# Patient Record
Sex: MALE | Race: WHITE | Employment: PART TIME | URBAN - METROPOLITAN AREA
[De-identification: names, ages, dates, MRNs, and addresses within clinical notes are randomized per-mention and may not be internally consistent; named-entity substitution may affect disease eponyms.]

---

## 2017-06-20 LAB — HIV AG/AB: NORMAL

## 2018-05-08 LAB
CHOLESTEROL, TOTAL: 230 MG/DL
CHOLESTEROL/HDL RATIO: 7.4
HDLC SERPL-MCNC: 31 MG/DL (ref 35–70)
LDL CHOLESTEROL CALCULATED: 155 MG/DL (ref 0–160)
TRIGL SERPL-MCNC: 220 MG/DL
VLDLC SERPL CALC-MCNC: ABNORMAL MG/DL

## 2019-01-17 ENCOUNTER — OFFICE VISIT (OUTPATIENT)
Dept: FAMILY MEDICINE CLINIC | Age: 64
End: 2019-01-17
Payer: COMMERCIAL

## 2019-01-17 VITALS
TEMPERATURE: 98.1 F | RESPIRATION RATE: 18 BRPM | BODY MASS INDEX: 27.76 KG/M2 | OXYGEN SATURATION: 98 % | HEART RATE: 78 BPM | WEIGHT: 228 LBS | SYSTOLIC BLOOD PRESSURE: 142 MMHG | DIASTOLIC BLOOD PRESSURE: 90 MMHG | HEIGHT: 76 IN

## 2019-01-17 DIAGNOSIS — E78.2 MIXED HYPERLIPIDEMIA: ICD-10-CM

## 2019-01-17 DIAGNOSIS — F41.9 ANXIETY: ICD-10-CM

## 2019-01-17 DIAGNOSIS — G47.00 INSOMNIA, UNSPECIFIED TYPE: ICD-10-CM

## 2019-01-17 DIAGNOSIS — I10 ESSENTIAL HYPERTENSION: ICD-10-CM

## 2019-01-17 DIAGNOSIS — M33.90 DERMATOMYOSITIS (HCC): ICD-10-CM

## 2019-01-17 PROCEDURE — 99203 OFFICE O/P NEW LOW 30 MIN: CPT | Performed by: FAMILY MEDICINE

## 2019-01-17 RX ORDER — NEBIVOLOL 10 MG/1
10 TABLET ORAL DAILY
COMMUNITY
End: 2019-01-17 | Stop reason: ALTCHOICE

## 2019-01-17 RX ORDER — CARVEDILOL 12.5 MG/1
12.5 TABLET ORAL 2 TIMES DAILY
Qty: 60 TABLET | Refills: 5 | Status: SHIPPED | OUTPATIENT
Start: 2019-01-17 | End: 2019-03-07 | Stop reason: ALTCHOICE

## 2019-01-17 RX ORDER — ZOLPIDEM TARTRATE 5 MG/1
5 TABLET ORAL NIGHTLY PRN
COMMUNITY
End: 2019-07-05 | Stop reason: SDUPTHER

## 2019-01-17 RX ORDER — IBUPROFEN 200 MG
200 TABLET ORAL DAILY
COMMUNITY

## 2019-01-17 RX ORDER — TAMSULOSIN HYDROCHLORIDE 0.4 MG/1
0.4 CAPSULE ORAL DAILY
COMMUNITY
End: 2022-01-13 | Stop reason: ALTCHOICE

## 2019-01-17 RX ORDER — ATORVASTATIN CALCIUM 40 MG/1
40 TABLET, FILM COATED ORAL DAILY
COMMUNITY
End: 2019-04-29 | Stop reason: SDUPTHER

## 2019-01-17 RX ORDER — LISINOPRIL 40 MG/1
40 TABLET ORAL DAILY
COMMUNITY
End: 2019-02-20 | Stop reason: SDUPTHER

## 2019-01-17 RX ORDER — CYANOCOBALAMIN (VITAMIN B-12) 1000 MCG
1000 TABLET, EXTENDED RELEASE ORAL DAILY
COMMUNITY
End: 2020-12-03

## 2019-01-17 RX ORDER — CLOMIPRAMINE HYDROCHLORIDE 25 MG/1
25 CAPSULE ORAL NIGHTLY
COMMUNITY
End: 2019-01-28 | Stop reason: SDUPTHER

## 2019-01-17 ASSESSMENT — PATIENT HEALTH QUESTIONNAIRE - PHQ9
SUM OF ALL RESPONSES TO PHQ QUESTIONS 1-9: 0
2. FEELING DOWN, DEPRESSED OR HOPELESS: 0
SUM OF ALL RESPONSES TO PHQ QUESTIONS 1-9: 0
SUM OF ALL RESPONSES TO PHQ9 QUESTIONS 1 & 2: 0
1. LITTLE INTEREST OR PLEASURE IN DOING THINGS: 0

## 2019-01-17 ASSESSMENT — ENCOUNTER SYMPTOMS
DIARRHEA: 0
EYE PAIN: 0
ABDOMINAL PAIN: 0
VOMITING: 0
EYE REDNESS: 0
CONSTIPATION: 0
COUGH: 0
RHINORRHEA: 0
SORE THROAT: 0
WHEEZING: 0
BACK PAIN: 1
SHORTNESS OF BREATH: 0
COLOR CHANGE: 0

## 2019-01-25 DIAGNOSIS — E78.2 MIXED HYPERLIPIDEMIA: ICD-10-CM

## 2019-01-25 DIAGNOSIS — I10 ESSENTIAL HYPERTENSION: ICD-10-CM

## 2019-01-25 LAB
A/G RATIO: 1.9 (ref 1.1–2.2)
ALBUMIN SERPL-MCNC: 4.3 G/DL (ref 3.4–5)
ALP BLD-CCNC: 75 U/L (ref 40–129)
ALT SERPL-CCNC: 29 U/L (ref 10–40)
ANION GAP SERPL CALCULATED.3IONS-SCNC: 12 MMOL/L (ref 3–16)
AST SERPL-CCNC: 22 U/L (ref 15–37)
BASOPHILS ABSOLUTE: 0 K/UL (ref 0–0.2)
BASOPHILS RELATIVE PERCENT: 0.8 %
BILIRUB SERPL-MCNC: 0.9 MG/DL (ref 0–1)
BUN BLDV-MCNC: 15 MG/DL (ref 7–20)
CALCIUM SERPL-MCNC: 9.2 MG/DL (ref 8.3–10.6)
CHLORIDE BLD-SCNC: 101 MMOL/L (ref 99–110)
CHOLESTEROL, TOTAL: 119 MG/DL (ref 0–199)
CO2: 29 MMOL/L (ref 21–32)
CREAT SERPL-MCNC: 1 MG/DL (ref 0.8–1.3)
EOSINOPHILS ABSOLUTE: 0.1 K/UL (ref 0–0.6)
EOSINOPHILS RELATIVE PERCENT: 3.1 %
GFR AFRICAN AMERICAN: >60
GFR NON-AFRICAN AMERICAN: >60
GLOBULIN: 2.3 G/DL
GLUCOSE BLD-MCNC: 98 MG/DL (ref 70–99)
HCT VFR BLD CALC: 49.3 % (ref 40.5–52.5)
HDLC SERPL-MCNC: 39 MG/DL (ref 40–60)
HEMOGLOBIN: 16.7 G/DL (ref 13.5–17.5)
LDL CHOLESTEROL CALCULATED: 61 MG/DL
LYMPHOCYTES ABSOLUTE: 1.5 K/UL (ref 1–5.1)
LYMPHOCYTES RELATIVE PERCENT: 32 %
MCH RBC QN AUTO: 28.8 PG (ref 26–34)
MCHC RBC AUTO-ENTMCNC: 33.8 G/DL (ref 31–36)
MCV RBC AUTO: 85.3 FL (ref 80–100)
MONOCYTES ABSOLUTE: 0.4 K/UL (ref 0–1.3)
MONOCYTES RELATIVE PERCENT: 8.9 %
NEUTROPHILS ABSOLUTE: 2.5 K/UL (ref 1.7–7.7)
NEUTROPHILS RELATIVE PERCENT: 55.2 %
PDW BLD-RTO: 14.3 % (ref 12.4–15.4)
PLATELET # BLD: 255 K/UL (ref 135–450)
PMV BLD AUTO: 8.7 FL (ref 5–10.5)
POTASSIUM SERPL-SCNC: 4.4 MMOL/L (ref 3.5–5.1)
PROSTATE SPECIFIC ANTIGEN: 2.74 NG/ML (ref 0–4)
RBC # BLD: 5.78 M/UL (ref 4.2–5.9)
SODIUM BLD-SCNC: 142 MMOL/L (ref 136–145)
TOTAL PROTEIN: 6.6 G/DL (ref 6.4–8.2)
TRIGL SERPL-MCNC: 94 MG/DL (ref 0–150)
VLDLC SERPL CALC-MCNC: 19 MG/DL
WBC # BLD: 4.6 K/UL (ref 4–11)

## 2019-01-26 LAB
ESTIMATED AVERAGE GLUCOSE: 99.7 MG/DL
HBA1C MFR BLD: 5.1 %

## 2019-01-27 ENCOUNTER — PATIENT MESSAGE (OUTPATIENT)
Dept: FAMILY MEDICINE CLINIC | Age: 64
End: 2019-01-27

## 2019-01-28 ENCOUNTER — PATIENT MESSAGE (OUTPATIENT)
Dept: FAMILY MEDICINE CLINIC | Age: 64
End: 2019-01-28

## 2019-01-28 RX ORDER — CLOMIPRAMINE HYDROCHLORIDE 25 MG/1
25 CAPSULE ORAL 3 TIMES DAILY
Qty: 90 CAPSULE | Refills: 2 | Status: SHIPPED | OUTPATIENT
Start: 2019-01-28 | End: 2019-08-27 | Stop reason: SDUPTHER

## 2019-02-20 RX ORDER — LISINOPRIL 40 MG/1
40 TABLET ORAL DAILY
Qty: 30 TABLET | Refills: 2 | Status: SHIPPED | OUTPATIENT
Start: 2019-02-20 | End: 2019-02-21 | Stop reason: SDUPTHER

## 2019-02-21 RX ORDER — LISINOPRIL 40 MG/1
40 TABLET ORAL DAILY
Qty: 30 TABLET | Refills: 2 | Status: SHIPPED | OUTPATIENT
Start: 2019-02-21 | End: 2019-06-05 | Stop reason: SDUPTHER

## 2019-03-07 ENCOUNTER — OFFICE VISIT (OUTPATIENT)
Dept: FAMILY MEDICINE CLINIC | Age: 64
End: 2019-03-07
Payer: COMMERCIAL

## 2019-03-07 VITALS
WEIGHT: 225 LBS | OXYGEN SATURATION: 97 % | BODY MASS INDEX: 27.4 KG/M2 | SYSTOLIC BLOOD PRESSURE: 164 MMHG | HEART RATE: 84 BPM | HEIGHT: 76 IN | DIASTOLIC BLOOD PRESSURE: 96 MMHG

## 2019-03-07 DIAGNOSIS — I49.3 ECTOPIC BEAT, VENTRICULAR: ICD-10-CM

## 2019-03-07 DIAGNOSIS — Z23 NEED FOR PNEUMOCOCCAL VACCINATION: ICD-10-CM

## 2019-03-07 DIAGNOSIS — I10 HYPERTENSION, UNSPECIFIED TYPE: Primary | ICD-10-CM

## 2019-03-07 DIAGNOSIS — R94.31 ABNORMAL EKG: ICD-10-CM

## 2019-03-07 PROCEDURE — 93000 ELECTROCARDIOGRAM COMPLETE: CPT | Performed by: FAMILY MEDICINE

## 2019-03-07 PROCEDURE — 90471 IMMUNIZATION ADMIN: CPT | Performed by: FAMILY MEDICINE

## 2019-03-07 PROCEDURE — 99214 OFFICE O/P EST MOD 30 MIN: CPT | Performed by: FAMILY MEDICINE

## 2019-03-07 PROCEDURE — 90670 PCV13 VACCINE IM: CPT | Performed by: FAMILY MEDICINE

## 2019-03-07 RX ORDER — NEBIVOLOL 10 MG/1
10 TABLET ORAL DAILY
Qty: 30 TABLET | Refills: 5 | Status: SHIPPED | OUTPATIENT
Start: 2019-03-07 | End: 2019-03-12 | Stop reason: CLARIF

## 2019-03-07 RX ORDER — HYDROCHLOROTHIAZIDE 12.5 MG/1
12.5 CAPSULE, GELATIN COATED ORAL EVERY MORNING
Qty: 30 CAPSULE | Refills: 5 | Status: SHIPPED | OUTPATIENT
Start: 2019-03-07 | End: 2019-10-03 | Stop reason: SINTOL

## 2019-03-07 ASSESSMENT — ENCOUNTER SYMPTOMS
EYE PAIN: 0
ABDOMINAL PAIN: 0
DIARRHEA: 0
EYE REDNESS: 0
WHEEZING: 0
COUGH: 0
SHORTNESS OF BREATH: 1
VOMITING: 0

## 2019-03-08 ENCOUNTER — PATIENT MESSAGE (OUTPATIENT)
Dept: FAMILY MEDICINE CLINIC | Age: 64
End: 2019-03-08

## 2019-03-12 ENCOUNTER — TELEPHONE (OUTPATIENT)
Dept: FAMILY MEDICINE CLINIC | Age: 64
End: 2019-03-12

## 2019-03-12 RX ORDER — CARVEDILOL PHOSPHATE 40 MG/1
40 CAPSULE, EXTENDED RELEASE ORAL
Qty: 30 CAPSULE | Refills: 2 | Status: SHIPPED | OUTPATIENT
Start: 2019-03-12 | End: 2019-04-29 | Stop reason: ALTCHOICE

## 2019-03-14 ENCOUNTER — PATIENT MESSAGE (OUTPATIENT)
Dept: FAMILY MEDICINE CLINIC | Age: 64
End: 2019-03-14

## 2019-03-20 RX ORDER — METHYLPREDNISOLONE 4 MG/1
TABLET ORAL
Qty: 1 KIT | Refills: 0 | Status: SHIPPED | OUTPATIENT
Start: 2019-03-20 | End: 2019-04-29

## 2019-04-29 ENCOUNTER — OFFICE VISIT (OUTPATIENT)
Dept: CARDIOLOGY CLINIC | Age: 64
End: 2019-04-29
Payer: COMMERCIAL

## 2019-04-29 VITALS
SYSTOLIC BLOOD PRESSURE: 155 MMHG | DIASTOLIC BLOOD PRESSURE: 81 MMHG | WEIGHT: 225 LBS | HEART RATE: 70 BPM | BODY MASS INDEX: 27.75 KG/M2

## 2019-04-29 DIAGNOSIS — E78.5 HYPERLIPIDEMIA, UNSPECIFIED HYPERLIPIDEMIA TYPE: ICD-10-CM

## 2019-04-29 DIAGNOSIS — R94.31 ABNORMAL EKG: ICD-10-CM

## 2019-04-29 DIAGNOSIS — I10 HYPERTENSION, UNSPECIFIED TYPE: Primary | ICD-10-CM

## 2019-04-29 PROCEDURE — 99244 OFF/OP CNSLTJ NEW/EST MOD 40: CPT | Performed by: INTERNAL MEDICINE

## 2019-04-29 PROCEDURE — 93268 ECG RECORD/REVIEW: CPT | Performed by: INTERNAL MEDICINE

## 2019-04-29 RX ORDER — ATORVASTATIN CALCIUM 40 MG/1
40 TABLET, FILM COATED ORAL DAILY
Qty: 90 TABLET | Refills: 3 | Status: SHIPPED | OUTPATIENT
Start: 2019-04-29 | End: 2020-04-13 | Stop reason: SDUPTHER

## 2019-04-29 RX ORDER — SILDENAFIL 50 MG/1
50 TABLET, FILM COATED ORAL PRN
Qty: 30 TABLET | Refills: 3
Start: 2019-04-29 | End: 2019-10-03 | Stop reason: DRUGHIGH

## 2019-04-29 NOTE — PATIENT INSTRUCTIONS
GXT nuc to cardiac risk stratify   Echo to assess LV function and valve pathology    Holter monitor 2 weeks to assess PVC burden   TSH mag CBC CMP   Discussed factors that may increase heart palpitations; coffee intake, fluid intake, decongestant, energy drinks, & stress       Stop coreg start metoprolol 25mg BID

## 2019-04-29 NOTE — PROGRESS NOTES
[facility] office        Sanaz Tidwell MD,  [address] [place]       Cardiology                  Nikolai Ny  1955 April 29, 2019    Reason for Consult: abnormal EKG       HPI:  The patient is 71 y.o. male with abnormal EKG, HR 83 / RBBB & PVCs LAD with bifascicular block   c/o occas lightheadedness, no c/o presyncope or syncope / no c/o cp SOB edema    HTN / no premature CAD  In family    No  PND/ denies RODRI   Remote smoker / no illicite drugs occas alcohol     Reviewed most recent tests with pt     Review of Systems:  Constitutional: No fatigue, weakness, night sweats or fever. HEENT: No new vision difficulties or ringing in the ears. Respiratory: No new SOB, PND, orthopnea or cough. Cardiovascular: See HPI   GI: No n/v, diarrhea, constipation, abdominal pain or changes in bowel habits. No melena, no hematochezia  : No urinary frequency, urgency, incontinence, hematuria or dysuria. Skin: No cyanosis or skin lesions. Musculoskeletal: No new muscle or joint pain. Neurological: No syncope or TIA-like symptoms.   Psychiatric: No anxiety, insomnia or depression     Past Medical History:   Diagnosis Date    Anxiety     BPH (benign prostatic hyperplasia) 2016    Chronic back pain     Colon polyps 2005    Dermatomyositis [facility])     ED (erectile dysfunction)     History of skin cancer     Hyperlipidemia     Hypertension 2008    Hypothyroidism     Insomnia     Osteoarthritis     Spinal cord tumor 2011    Testosterone deficiency      Past Surgical History:   Procedure Laterality Date    COLONOSCOPY  09/13/2012    Dr. Lynnette Pierre COLONOSCOPY  04/16/2005    Dr. Vincenzo Wood, adenomatous polyps    COLONOSCOPY  06/25/2009    Dr. Yolanda Dalton  12/30/2015    Dr. Vincenzo Wood, recheck 2020    LUMBAR LAMINECTOMY  2008    L1-L2    NASAL SEPTUM SURGERY  1976    NERVE BIOPSY  2011    Spine:  myxopapillary ependymoma     SKIN CANCER EXCISION  12/08/2004    SCC chest wall    TYMPANOSTOMY TUBE PLACEMENT Bilateral 2017    UPPER GASTROINTESTINAL ENDOSCOPY  09/13/2012    Dr. Shelli Hoyos     Family History   Problem Relation Age of Onset    Diabetes Mother     Breast Cancer Mother     Cancer Mother         bladder    Coronary Art Dis Father     Colon Cancer Father     Heart Disease Brother     No Known Problems Brother      Social History     Tobacco Use    Smoking status: Former Smoker     Packs/day: 1.00     Years: 22.00     Pack years: 22.00     Types: Cigarettes    Smokeless tobacco: Never Used   Substance Use Topics    Alcohol use: Yes     Comment: OCCASIONAL    Drug use: Not on file       No Known Allergies  Current Outpatient Medications   Medication Sig Dispense Refill    methylPREDNISolone (MEDROL, FELICIA,) 4 MG tablet Take by mouth. 1 kit 0    carvedilol (COREG CR) 40 MG CP24 extended release capsule Take 1 capsule by mouth daily (with breakfast) 30 capsule 2    hydrochlorothiazide (MICROZIDE) 12.5 MG capsule Take 1 capsule by mouth every morning 30 capsule 5    lisinopril (PRINIVIL;ZESTRIL) 40 MG tablet Take 1 tablet by mouth daily 30 tablet 2    clomiPRAMINE (ANAFRANIL) 25 MG capsule Take 1 capsule by mouth 3 times daily 90 capsule 2    thyroid (ARMOUR) 130 MG tablet Take 130 mg by mouth daily      tamsulosin (FLOMAX) 0.4 MG capsule Take 0.4 mg by mouth daily      zolpidem (AMBIEN) 5 MG tablet Take 5 mg by mouth nightly as needed for Sleep. Christopher Mark atorvastatin (LIPITOR) 40 MG tablet Take 40 mg by mouth daily      Testosterone 20 % CREA by Does not apply route. Rosanna Mark ibuprofen (ADVIL;MOTRIN) 200 MG tablet Take 200 mg by mouth 2 times daily      Cholecalciferol (VITAMIN D3) 5000 units TABS Take 1 tablet by mouth daily      Nutritional Supplements (DHEA PO) Take 35 mg by mouth daily      Cyanocobalamin (VITAMIN B-12) 1000 MCG extended release tablet Take 1,000 mcg by mouth daily       No current No results for input(s): WBC, HGB, HCT, PLT in the last 72 hours. I have reviewed any available labs, images, any stress test, LHC on this pt       Please CC this note to PCP     Assessment:     Abnormal EKG   EKG  4/29/19 abnormal EKG, Rate 83 / RBBB & PVCs LAD with bifascicular block   c/o occas lightheadedness, no c/o presyncope or syncope / no c/o cp SOB edema    No  PND/ denies RODRI  / works out without pain      Plan:  GXT nuc to cardiac risk stratify   Echo to assess LV function and valve pathology    Holter monitor 2 weeks to assess PVC burden   TSH mag CBC CMP   Discussed factors that may increase heart palpitations; coffee intake, fluid intake, decongestant, energy drinks, & stress       Stop coreg start metoprolol 25mg BID   Fu in approx 4 weeks     Coby BRADEN, COLLIN    The EKG is abnormal showing bifascicular block. Left axis deviation and right bundle branch block. And had 2 PVCs. He has no symptoms except thinks that in the past year or so he has more easy fatigue. He can only swim 2 laps now and having to rest whereas before he could do for laps. He still golfs regularly and walks regularly and has no other limitations. He does not smoke or drink. At this time we will plan to do an ischemia evaluation. Also evaluate his palpitations with a monitor for 2 weeks. Magnesium level CBC and thyroid studies. Roxy Gallagher M.D.  [facility]

## 2019-05-01 DIAGNOSIS — R94.31 ABNORMAL EKG: ICD-10-CM

## 2019-05-01 DIAGNOSIS — E78.5 HYPERLIPIDEMIA, UNSPECIFIED HYPERLIPIDEMIA TYPE: ICD-10-CM

## 2019-05-01 DIAGNOSIS — I10 HYPERTENSION, UNSPECIFIED TYPE: ICD-10-CM

## 2019-05-01 LAB
HCT VFR BLD CALC: 44.4 % (ref 40.5–52.5)
HEMOGLOBIN: 15.1 G/DL (ref 13.5–17.5)
MAGNESIUM: 2.2 MG/DL (ref 1.8–2.4)
MCH RBC QN AUTO: 27.1 PG (ref 26–34)
MCHC RBC AUTO-ENTMCNC: 34.1 G/DL (ref 31–36)
MCV RBC AUTO: 79.5 FL (ref 80–100)
PDW BLD-RTO: 15.2 % (ref 12.4–15.4)
PLATELET # BLD: 244 K/UL (ref 135–450)
PMV BLD AUTO: 8.9 FL (ref 5–10.5)
RBC # BLD: 5.58 M/UL (ref 4.2–5.9)
TSH REFLEX FT4: 0.37 UIU/ML (ref 0.27–4.2)
VITAMIN D 25-HYDROXY: 88.1 NG/ML
WBC # BLD: 5.5 K/UL (ref 4–11)

## 2019-05-04 ENCOUNTER — HOSPITAL ENCOUNTER (OUTPATIENT)
Dept: NUCLEAR MEDICINE | Age: 64
Discharge: HOME | End: 2019-05-04
Payer: COMMERCIAL

## 2019-05-04 ENCOUNTER — HOSPITAL ENCOUNTER (OUTPATIENT)
Dept: NON INVASIVE DIAGNOSTICS | Age: 64
Discharge: HOME | End: 2019-05-04
Payer: COMMERCIAL

## 2019-05-04 DIAGNOSIS — R94.31 ABNORMAL EKG: ICD-10-CM

## 2019-05-04 DIAGNOSIS — I10 HYPERTENSION, UNSPECIFIED TYPE: ICD-10-CM

## 2019-05-04 DIAGNOSIS — E78.5 HYPERLIPIDEMIA, UNSPECIFIED HYPERLIPIDEMIA TYPE: ICD-10-CM

## 2019-05-04 LAB
LV EF: 51 %
LV EF: 58 %
LVEF MODALITY: NORMAL
LVEF MODALITY: NORMAL

## 2019-05-04 PROCEDURE — A9502 TC99M TETROFOSMIN: HCPCS | Performed by: NURSE PRACTITIONER

## 2019-05-04 PROCEDURE — 93017 CV STRESS TEST TRACING ONLY: CPT

## 2019-05-04 PROCEDURE — 93306 TTE W/DOPPLER COMPLETE: CPT

## 2019-05-04 PROCEDURE — 2580000003 HC RX 258: Performed by: NURSE PRACTITIONER

## 2019-05-04 PROCEDURE — 3430000000 HC RX DIAGNOSTIC RADIOPHARMACEUTICAL: Performed by: NURSE PRACTITIONER

## 2019-05-04 PROCEDURE — 78452 HT MUSCLE IMAGE SPECT MULT: CPT

## 2019-05-04 RX ORDER — SODIUM CHLORIDE 0.9 % (FLUSH) 0.9 %
10 SYRINGE (ML) INJECTION PRN
Status: DISCONTINUED | OUTPATIENT
Start: 2019-05-04 | End: 2019-05-05 | Stop reason: HOSPADM

## 2019-05-04 RX ADMIN — TETROFOSMIN 30 MILLICURIE: 1.38 INJECTION, POWDER, LYOPHILIZED, FOR SOLUTION INTRAVENOUS at 11:19

## 2019-05-04 RX ADMIN — TETROFOSMIN 10 MILLICURIE: 1.38 INJECTION, POWDER, LYOPHILIZED, FOR SOLUTION INTRAVENOUS at 10:26

## 2019-05-04 RX ADMIN — Medication 10 ML: at 10:26

## 2019-05-04 RX ADMIN — Medication 10 ML: at 11:19

## 2019-05-06 ENCOUNTER — OFFICE VISIT (OUTPATIENT)
Dept: CARDIOLOGY CLINIC | Age: 64
End: 2019-05-06
Payer: COMMERCIAL

## 2019-05-06 ENCOUNTER — DIRECT ADMIT ORDERS (OUTPATIENT)
Dept: CARDIOLOGY CLINIC | Age: 64
End: 2019-05-06

## 2019-05-06 VITALS
WEIGHT: 223.4 LBS | HEART RATE: 103 BPM | SYSTOLIC BLOOD PRESSURE: 160 MMHG | BODY MASS INDEX: 27.55 KG/M2 | DIASTOLIC BLOOD PRESSURE: 80 MMHG

## 2019-05-06 DIAGNOSIS — R94.39 ABNORMAL STRESS TEST: ICD-10-CM

## 2019-05-06 DIAGNOSIS — R94.39 ABNORMAL STRESS TEST: Primary | ICD-10-CM

## 2019-05-06 PROCEDURE — 0296T PR EXT ECG > 48HR TO 21 DAY RCRD W/CONECT INTL RCRD: CPT | Performed by: INTERNAL MEDICINE

## 2019-05-06 PROCEDURE — 99214 OFFICE O/P EST MOD 30 MIN: CPT | Performed by: INTERNAL MEDICINE

## 2019-05-06 RX ORDER — SODIUM CHLORIDE 0.9 % (FLUSH) 0.9 %
10 SYRINGE (ML) INJECTION PRN
Status: CANCELLED | OUTPATIENT
Start: 2019-05-06

## 2019-05-06 RX ORDER — SODIUM CHLORIDE 9 MG/ML
INJECTION, SOLUTION INTRAVENOUS CONTINUOUS
Status: CANCELLED | OUTPATIENT
Start: 2019-05-06

## 2019-05-06 RX ORDER — DIPHENHYDRAMINE HYDROCHLORIDE 50 MG/ML
25 INJECTION INTRAMUSCULAR; INTRAVENOUS ONCE
Status: CANCELLED | OUTPATIENT
Start: 2019-05-06 | End: 2019-05-06

## 2019-05-06 RX ORDER — SODIUM CHLORIDE 0.9 % (FLUSH) 0.9 %
10 SYRINGE (ML) INJECTION EVERY 12 HOURS SCHEDULED
Status: CANCELLED | OUTPATIENT
Start: 2019-05-06

## 2019-05-06 NOTE — PROGRESS NOTES
[facility]   Dr Scout Ayala. Demian HAYDEN, [address] [facility]    Outpatient Follow Up Note        Reason for Consult: abnormal EKG         HPI:  The patient is 71 y.o. male with abnormal EKG, HR 83 / RBBB & PVCs LAD with bifascicular block   c/o occas lightheadedness, no c/o presyncope or syncope / no c/o cp SOB edema    HTN / no premature CAD  In family    No  PND/ denies RODRI   Remote smoker / no illicite drugs occas alcohol     Past Medical History:   Diagnosis Date    Anxiety     BPH (benign prostatic hyperplasia) 2016    Chronic back pain     Colon polyps 2005    Dermatomyositis ([address]     ED (erectile dysfunction)     History of skin cancer     Hyperlipidemia     Hypertension 2008    Hypothyroidism     Insomnia     Osteoarthritis     Spinal cord tumor 2011    Testosterone deficiency      Past Surgical History  Past Surgical History:   Procedure Laterality Date    COLONOSCOPY  09/13/2012    Dr. Annie Flood COLONOSCOPY  04/16/2005    Dr. Roxanna Gunderson, adenomatous polyps    COLONOSCOPY  06/25/2009    Dr. Steven Fenge  12/30/2015    Dr. Roxanna Gunderson, recheck 2020    LUMBAR LAMINECTOMY  2008    L1-L2    NASAL SEPTUM SURGERY  1976    NERVE BIOPSY  2011    Spine:  myxopapillary ependymoma     SKIN CANCER EXCISION  12/08/2004    SCC chest wall    TYMPANOSTOMY TUBE PLACEMENT Bilateral 2017    UPPER GASTROINTESTINAL ENDOSCOPY  09/13/2012    Dr. Roxanna Gunderson     Social History:       Social History     Tobacco Use   Smoking Status Former Smoker    Packs/day: 1.00    Years: 22.00    Pack years: 22.00    Types: Cigarettes   Smokeless Tobacco Never Used     Current Medications:  Prior to Visit Medications    Medication Sig Taking?  Authorizing Provider   metoprolol tartrate (LOPRESSOR) 25 MG tablet Take 1 tablet by mouth 2 times daily Yes SAMPSON Eng CNP   atorvastatin (LIPITOR) 40 MG tablet Take 1 tablet by mouth daily Yes SAMPSON Eng CNP   sildenafil (VIAGRA) 50 MG tablet Take 1 tablet by mouth as needed for Erectile Dysfunction Yes SAMPSON Gamboa CNP   hydrochlorothiazide (MICROZIDE) 12.5 MG capsule Take 1 capsule by mouth every morning Yes Erica Oakley DO   lisinopril (PRINIVIL;ZESTRIL) 40 MG tablet Take 1 tablet by mouth daily Yes Erica Oakley DO   clomiPRAMINE (ANAFRANIL) 25 MG capsule Take 1 capsule by mouth 3 times daily Yes SAMPSON Burleson CNP   thyroid (ARMOUR) 130 MG tablet Take 130 mg by mouth daily Yes Historical Provider, MD   tamsulosin (FLOMAX) 0.4 MG capsule Take 0.4 mg by mouth daily Yes Historical Provider, MD   zolpidem (AMBIEN) 5 MG tablet Take 5 mg by mouth nightly as needed for Sleep. . Yes Historical Provider, MD   Testosterone 20 % CREA by Does not apply route. . Yes Historical Provider, MD   ibuprofen (ADVIL;MOTRIN) 200 MG tablet Take 200 mg by mouth 2 times daily Yes Historical Provider, MD   Cholecalciferol (VITAMIN D3) 5000 units TABS Take 1 tablet by mouth daily Yes Historical Provider, MD   Nutritional Supplements (DHEA PO) Take 35 mg by mouth daily Yes Historical Provider, MD   Cyanocobalamin (VITAMIN B-12) 1000 MCG extended release tablet Take 1,000 mcg by mouth daily Yes Historical Provider, MD     Family History   Problem Relation Age of Onset    Diabetes Mother     Breast Cancer Mother     Cancer Mother         bladder    Coronary Art Dis Father     Colon Cancer Father     Heart Disease Brother     No Known Problems Brother      Current Outpatient Medications   Medication Sig Dispense Refill    metoprolol tartrate (LOPRESSOR) 25 MG tablet Take 1 tablet by mouth 2 times daily 60 tablet 3    atorvastatin (LIPITOR) 40 MG tablet Take 1 tablet by mouth daily 90 tablet 3    sildenafil (VIAGRA) 50 MG tablet Take 1 tablet by mouth as needed for Erectile Dysfunction 30 tablet 3    hydrochlorothiazide (MICROZIDE) 12.5 MG capsule Take 1 capsule by mouth every morning 30 capsule 5    lisinopril (PRINIVIL;ZESTRIL) 40 MG tablet Take 1 tablet by mouth daily 30 tablet 2    clomiPRAMINE (ANAFRANIL) 25 MG capsule Take 1 capsule by mouth 3 times daily 90 capsule 2    thyroid (ARMOUR) 130 MG tablet Take 130 mg by mouth daily      tamsulosin (FLOMAX) 0.4 MG capsule Take 0.4 mg by mouth daily      zolpidem (AMBIEN) 5 MG tablet Take 5 mg by mouth nightly as needed for Sleep. Ozell Lung Testosterone 20 % CREA by Does not apply route. Ozell Lung ibuprofen (ADVIL;MOTRIN) 200 MG tablet Take 200 mg by mouth 2 times daily      Cholecalciferol (VITAMIN D3) 5000 units TABS Take 1 tablet by mouth daily      Nutritional Supplements (DHEA PO) Take 35 mg by mouth daily      Cyanocobalamin (VITAMIN B-12) 1000 MCG extended release tablet Take 1,000 mcg by mouth daily       No current facility-administered medications for this visit. REVIEW OF SYSTEMS:    CONSTITUTIONAL: No major weight gain or loss, fatigue, weakness, night sweats or fever. HEENT: No new vision difficulties or ringing in the ears. RESPIRATORY: No new SOB, PND, orthopnea or cough. CARDIOVASCULAR: See HPI  GI: No nausea, vomiting, diarrhea, constipation, abdominal pain or changes in bowel habits. : No urinary frequency, urgency, incontinence hematuria or dysuria. SKIN: No cyanosis or skin lesions. MUSCULOSKELETAL: No new muscle or joint pain. NEUROLOGICAL: No syncope or TIA-like symptoms.   PSYCHIATRIC: No anxiety, pain, insomnia or depression    Objective:   PHYSICAL EXAM:        VITALS:    Wt Readings from Last 3 Encounters:   05/06/19 223 lb 6.4 oz (101.3 kg)   04/29/19 225 lb (102.1 kg)   03/07/19 225 lb (102.1 kg)     BP Readings from Last 3 Encounters:   05/06/19 (!) 160/80   04/29/19 (!) 155/81   03/07/19 (!) 164/96     Pulse Readings from Last 3 Encounters:   05/06/19 103   04/29/19 70   03/07/19 84       CONSTITUTIONAL: Cooperative, no apparent distress, and appears well nourished / developed  NEUROLOGIC:  Awake and orientated to person, place and time.  PSYCH: Calm affect. SKIN: Warm and dry. HEENT: Sclera non-icteric, normocephalic, neck supple, no elevation of JVP, normal carotid pulses with no bruits and thyroid normal size. LUNGS:  No increased work of breathing and clear to auscultation, no crackles or wheezing  CARDIOVASCULAR:  Regular rate and rhythm with no murmurs, gallops, rubs, or abnormal heart sounds, normal PMI. The apical impulses not displaced  Heart tones are crisp and normal  Cervical veins are not engorged  The carotid upstroke is normal in amplitude and contour without delay or bruit  JVP is not elevated  ABDOMEN:  Normal bowel sounds, non-distended and non-tender to palpation  EXT: No edema, no calf tenderness. Pulses are present bilaterally.     DATA:    Lab Results   Component Value Date    ALT 29 01/25/2019    AST 22 01/25/2019    ALKPHOS 75 01/25/2019    BILITOT 0.9 01/25/2019     Lab Results   Component Value Date    CREATININE 1.0 01/25/2019    BUN 15 01/25/2019     01/25/2019    K 4.4 01/25/2019     01/25/2019    CO2 29 01/25/2019     No results found for: TSH, V5RTNJI, D6IJDWP, THYROIDAB  Lab Results   Component Value Date    WBC 5.5 05/01/2019    HGB 15.1 05/01/2019    HCT 44.4 05/01/2019    MCV 79.5 (L) 05/01/2019     05/01/2019     No components found for: CHLPL  Lab Results   Component Value Date    TRIG 94 01/25/2019     Lab Results   Component Value Date    HDL 39 (L) 01/25/2019     Lab Results   Component Value Date    LDLCALC 61 01/25/2019     Lab Results   Component Value Date    LABVLDL 19 01/25/2019     Radiology Review:  Pertinent images / reports were reviewed as a part of this visit and reveals the following:    Echo 5/4/19   Moderate size, moderate intensity, primarily reversible defect in the    inferior wall suggestive of possible RCA ischemia.    Normal LV size and borderline normal systolic function.    Left ventricular ejection fraction of 51 %.    There is hypokinesis of the septal and apical walls.    Overall findings represent a intermediate risk scan. 5/4/19  echo   Left ventricular cavity size is normal. There is mildly increased left   ventricular wall thickness. Ejection fraction is visually estimated to be   55-60%. Normal diastolic function.   Mitral annular calcification is present. Plan  abnormal stress test with SOB   [facility]          Please call if we can assist further [phone]. Sofía English. Ana Hou MD, [facility]  This patient is seen today in the office to discuss his abnormal stress test.  It appears there is significant inferior wall perhaps right coronary artery lesion. He is not having any pain but he is physically active. He needs a heart cath. I spoke with both he and his wife in the office today and they are in agreement. Heart cath tomorrow. Creatinine was 1.0. No allergies. María Isaac M.D.  [facility]

## 2019-05-07 ENCOUNTER — HOSPITAL ENCOUNTER (OUTPATIENT)
Dept: CARDIAC CATH/INVASIVE PROCEDURES | Age: 64
Discharge: HOME | End: 2019-05-07
Attending: INTERNAL MEDICINE | Admitting: INTERNAL MEDICINE
Payer: COMMERCIAL

## 2019-05-07 VITALS — WEIGHT: 223 LBS | HEIGHT: 76 IN | BODY MASS INDEX: 27.16 KG/M2

## 2019-05-07 LAB
A/G RATIO: 1.3 (ref 1.1–2.2)
ALBUMIN SERPL-MCNC: 4 G/DL (ref 3.4–5)
ALP BLD-CCNC: 86 U/L (ref 40–129)
ALT SERPL-CCNC: 23 U/L (ref 10–40)
ANION GAP SERPL CALCULATED.3IONS-SCNC: 14 MMOL/L (ref 3–16)
AST SERPL-CCNC: 19 U/L (ref 15–37)
BASOPHILS ABSOLUTE: 0 K/UL (ref 0–0.2)
BASOPHILS RELATIVE PERCENT: 0.8 %
BILIRUB SERPL-MCNC: 0.5 MG/DL (ref 0–1)
BUN BLDV-MCNC: 19 MG/DL (ref 7–20)
CALCIUM SERPL-MCNC: 9.7 MG/DL (ref 8.3–10.6)
CHLORIDE BLD-SCNC: 99 MMOL/L (ref 99–110)
CO2: 27 MMOL/L (ref 21–32)
CREAT SERPL-MCNC: 1.4 MG/DL (ref 0.8–1.3)
EKG ATRIAL RATE: 93 BPM
EKG DIAGNOSIS: NORMAL
EKG P AXIS: 41 DEGREES
EKG P-R INTERVAL: 170 MS
EKG Q-T INTERVAL: 376 MS
EKG QRS DURATION: 140 MS
EKG QTC CALCULATION (BAZETT): 467 MS
EKG R AXIS: -53 DEGREES
EKG T AXIS: 24 DEGREES
EKG VENTRICULAR RATE: 93 BPM
EOSINOPHILS ABSOLUTE: 0.1 K/UL (ref 0–0.6)
EOSINOPHILS RELATIVE PERCENT: 2.2 %
GFR AFRICAN AMERICAN: >60
GFR NON-AFRICAN AMERICAN: 51
GLOBULIN: 3 G/DL
GLUCOSE BLD-MCNC: 100 MG/DL (ref 70–99)
HCT VFR BLD CALC: 46.4 % (ref 40.5–52.5)
HEMOGLOBIN: 15.5 G/DL (ref 13.5–17.5)
INR BLD: 1.1 (ref 0.85–1.15)
LEFT VENTRICULAR EJECTION FRACTION HIGH VALUE: 60 %
LEFT VENTRICULAR EJECTION FRACTION MODE: NORMAL
LV EF: 55 %
LYMPHOCYTES ABSOLUTE: 1.5 K/UL (ref 1–5.1)
LYMPHOCYTES RELATIVE PERCENT: 30.1 %
MAGNESIUM: 2.2 MG/DL (ref 1.8–2.4)
MCH RBC QN AUTO: 26.6 PG (ref 26–34)
MCHC RBC AUTO-ENTMCNC: 33.3 G/DL (ref 31–36)
MCV RBC AUTO: 79.7 FL (ref 80–100)
MONOCYTES ABSOLUTE: 0.5 K/UL (ref 0–1.3)
MONOCYTES RELATIVE PERCENT: 9.6 %
NEUTROPHILS ABSOLUTE: 2.9 K/UL (ref 1.7–7.7)
NEUTROPHILS RELATIVE PERCENT: 57.3 %
PDW BLD-RTO: 15.5 % (ref 12.4–15.4)
PLATELET # BLD: 245 K/UL (ref 135–450)
PMV BLD AUTO: 8.9 FL (ref 5–10.5)
POTASSIUM SERPL-SCNC: 3.4 MMOL/L (ref 3.5–5.1)
RBC # BLD: 5.82 M/UL (ref 4.2–5.9)
SODIUM BLD-SCNC: 140 MMOL/L (ref 136–145)
TOTAL PROTEIN: 7 G/DL (ref 6.4–8.2)
WBC # BLD: 5.1 K/UL (ref 4–11)

## 2019-05-07 PROCEDURE — C1769 GUIDE WIRE: HCPCS

## 2019-05-07 PROCEDURE — 99153 MOD SED SAME PHYS/QHP EA: CPT

## 2019-05-07 PROCEDURE — 6360000004 HC RX CONTRAST MEDICATION: Performed by: INTERNAL MEDICINE

## 2019-05-07 PROCEDURE — 85610 PROTHROMBIN TIME: CPT

## 2019-05-07 PROCEDURE — 93010 ELECTROCARDIOGRAM REPORT: CPT | Performed by: INTERNAL MEDICINE

## 2019-05-07 PROCEDURE — 2500000003 HC RX 250 WO HCPCS

## 2019-05-07 PROCEDURE — 93571 IV DOP VEL&/PRESS C FLO 1ST: CPT

## 2019-05-07 PROCEDURE — 93458 L HRT ARTERY/VENTRICLE ANGIO: CPT | Performed by: INTERNAL MEDICINE

## 2019-05-07 PROCEDURE — 93571 IV DOP VEL&/PRESS C FLO 1ST: CPT | Performed by: INTERNAL MEDICINE

## 2019-05-07 PROCEDURE — 99152 MOD SED SAME PHYS/QHP 5/>YRS: CPT | Performed by: INTERNAL MEDICINE

## 2019-05-07 PROCEDURE — 6370000000 HC RX 637 (ALT 250 FOR IP): Performed by: INTERNAL MEDICINE

## 2019-05-07 PROCEDURE — 93005 ELECTROCARDIOGRAM TRACING: CPT | Performed by: INTERNAL MEDICINE

## 2019-05-07 PROCEDURE — C1887 CATHETER, GUIDING: HCPCS

## 2019-05-07 PROCEDURE — 2580000003 HC RX 258: Performed by: INTERNAL MEDICINE

## 2019-05-07 PROCEDURE — 93458 L HRT ARTERY/VENTRICLE ANGIO: CPT

## 2019-05-07 PROCEDURE — C1894 INTRO/SHEATH, NON-LASER: HCPCS

## 2019-05-07 PROCEDURE — 99231 SBSQ HOSP IP/OBS SF/LOW 25: CPT | Performed by: INTERNAL MEDICINE

## 2019-05-07 PROCEDURE — 6360000002 HC RX W HCPCS

## 2019-05-07 PROCEDURE — 2709999900 HC NON-CHARGEABLE SUPPLY

## 2019-05-07 PROCEDURE — 99152 MOD SED SAME PHYS/QHP 5/>YRS: CPT

## 2019-05-07 RX ORDER — SODIUM CHLORIDE 0.9 % (FLUSH) 0.9 %
10 SYRINGE (ML) INJECTION PRN
Status: DISCONTINUED | OUTPATIENT
Start: 2019-05-07 | End: 2019-05-08 | Stop reason: HOSPADM

## 2019-05-07 RX ORDER — SODIUM CHLORIDE 9 MG/ML
INJECTION, SOLUTION INTRAVENOUS CONTINUOUS
Status: DISCONTINUED | OUTPATIENT
Start: 2019-05-07 | End: 2019-05-07 | Stop reason: HOSPADM

## 2019-05-07 RX ORDER — SODIUM CHLORIDE 9 MG/ML
INJECTION, SOLUTION INTRAVENOUS CONTINUOUS
Status: DISCONTINUED | OUTPATIENT
Start: 2019-05-07 | End: 2019-05-07 | Stop reason: ALTCHOICE

## 2019-05-07 RX ORDER — ACETAMINOPHEN 325 MG/1
650 TABLET ORAL EVERY 4 HOURS PRN
Status: DISCONTINUED | OUTPATIENT
Start: 2019-05-07 | End: 2019-05-08 | Stop reason: HOSPADM

## 2019-05-07 RX ORDER — ALPRAZOLAM 0.5 MG/1
0.5 TABLET ORAL ONCE
Status: COMPLETED | OUTPATIENT
Start: 2019-05-07 | End: 2019-05-07

## 2019-05-07 RX ORDER — SODIUM CHLORIDE 0.9 % (FLUSH) 0.9 %
10 SYRINGE (ML) INJECTION EVERY 12 HOURS SCHEDULED
Status: DISCONTINUED | OUTPATIENT
Start: 2019-05-07 | End: 2019-05-08 | Stop reason: HOSPADM

## 2019-05-07 RX ORDER — DIPHENHYDRAMINE HYDROCHLORIDE 50 MG/ML
25 INJECTION INTRAMUSCULAR; INTRAVENOUS ONCE
Status: DISCONTINUED | OUTPATIENT
Start: 2019-05-07 | End: 2019-05-08 | Stop reason: HOSPADM

## 2019-05-07 RX ORDER — ASPIRIN 325 MG
325 TABLET ORAL ONCE
Status: COMPLETED | OUTPATIENT
Start: 2019-05-07 | End: 2019-05-07

## 2019-05-07 RX ADMIN — SODIUM CHLORIDE: 900 INJECTION, SOLUTION INTRAVENOUS at 13:11

## 2019-05-07 RX ADMIN — ALPRAZOLAM 0.5 MG: 0.5 TABLET ORAL at 13:16

## 2019-05-07 RX ADMIN — ASPIRIN 325 MG: 325 TABLET ORAL at 13:17

## 2019-05-07 RX ADMIN — IOHEXOL 200 ML: 350 INJECTION, SOLUTION INTRAVENOUS at 15:52

## 2019-05-07 NOTE — PROCEDURES
The [facility] CATH    Adalid Spar   71 y.o., male  1955 5/7/2019      Procedure  Selective Coronary Angiography  Cardiac Catheterization for Coronary Anatomy  Left Heart Catheterization  Left Ventriculogram  IFR of the LAD  Arterial Access Right Radial Artery after a negative Austin test  TR Band    Indication:Cardiac cath to rule out ischemic CAD, Possible angioplasty, The procedure and risks described to patient including risk of CVA, MI, bleeding, emergency surgery, death,  , Consent signed or positive stress test  Unspecified Angina  Anesthesia: Moderate sedation with Versed and Fentanyl IV  Estimated blood loss :minimal  Abnormal Stress Test      Procedure:  After written informed consent was obtained, the patient was   brought to the cardiac catheterization suite, where patient was prepped and   draped in the usual sterile fashion. Local anesthesia was achieved in the   right wrist with 2% lidocaine. A 5-Pashto hemostasis sheath was placed into   the right radial artery. The pre cocktail of heparin, verapamil and nitroglycerin was injected into the sheath. A 5-Pashto Kunal catheter was introduced; used to engage the left main   coronary artery. Radiographic  images were obtained. The catheter was pulled back then rotated. The Kunal catheter could not engage the right coronary . Tried the [facility] and AR mod and AL1  artery. Radiographic  images were obtained. The catheter was removed and exchanged over an exchange length 0.35 soft guide wire. A 5- 3DRC was used in the LV for hand injection LV gram  catheter was then positioned in the left ventricle. Left ventriculogram was performed.  After these images were obtained. , the   catheter was flushed, pulled back across the aortic valve revealing no gradient. The catheter was removed. The hemostasis sheath was removed and hemostasis was achieved using a TR Band     There were no complications. Patient tolerated the procedure well. The   patient was transferred to the holding area in stable condition. Results:  Left ventricular pressure 6 mmHg  Aortic pressure 142 mmHg    Coronary anatomy:   The left main coronary artery is normal.     Left anterior descending artery has proximal narrowing 20-25% IFR was 0.96    Circumflex artery is normal and codominant    The right coronary artery has awkward anterior takeoff and  is a co- dominant vessel and normal.     Left ventriculogram shows ejection fraction of 55%. Normal wall motion.       Impression:  Mild CAD and no intervention     Plan:  Primary prevention Optimize lipids and BP LDL was 61    Medical management      This note was likely completed using voice recognition technology and may contain unintended errors  Rafael Crowe M.D., [facility]; [address], DO

## 2019-05-14 ENCOUNTER — TELEPHONE (OUTPATIENT)
Dept: CARDIOLOGY CLINIC | Age: 64
End: 2019-05-14

## 2019-05-14 ENCOUNTER — OFFICE VISIT (OUTPATIENT)
Dept: CARDIOLOGY CLINIC | Age: 64
End: 2019-05-14
Payer: COMMERCIAL

## 2019-05-14 VITALS
BODY MASS INDEX: 27.28 KG/M2 | SYSTOLIC BLOOD PRESSURE: 160 MMHG | DIASTOLIC BLOOD PRESSURE: 70 MMHG | WEIGHT: 221.2 LBS | HEART RATE: 80 BPM

## 2019-05-14 DIAGNOSIS — Z92.89 H/O ANGIOGRAPHY: ICD-10-CM

## 2019-05-14 PROCEDURE — 99214 OFFICE O/P EST MOD 30 MIN: CPT | Performed by: NURSE PRACTITIONER

## 2019-05-14 RX ORDER — ASPIRIN 81 MG/1
81 TABLET ORAL DAILY
Qty: 90 TABLET | Refills: 3
Start: 2019-05-14 | End: 2020-01-10 | Stop reason: ALTCHOICE

## 2019-05-14 NOTE — PROGRESS NOTES
[address]  Office Visit           Henry Morris MD,  [address] FNP [address] [facility]       Cardiology           [facility]  1955    May 14, 2019    CC: here with abnormal EKG, HR 83 / RBBB / recent  LHC     HPI:  The patient is 71 y.o. male with abnormal EKG, HR 83 / RBBB & PVCs LAD with bifascicular block   HTN / no premature CAD  In family    No  PND/ denies RODRI   Remote smoker / no illicite drugs occas alcohol     No c/o cp SOB PND RODRI , euvolemic   b/p slightly usual wnl     abnormal stress test     [address] [facility] 5/2019 Coronary anatomy:   The left main coronary artery is normal.   Left anterior descending artery has proximal narrowing 20-25% IFR was 0.96  Circumflex artery is normal and codominant  The right coronary artery has awkward anterior takeoff and  is a co- dominant vessel and normal.   Left ventriculogram shows ejection fraction of 55%. Normal wall motion. Impression:  Mild CAD and no intervention   Plan:  Primary prevention Optimize lipids and BP LDL was 61    Medical management    Reviewed most recent test with pt. Review of Systems:  Constitutional: Denies  fatigue, weakness, night sweats or fever. HEENT: Denies new visual changes, ringing in ears, nosebleeds,nasal congestion  Respiratory: Denies new or change in SOB, PND, orthopnea or cough. Cardiovascular: see HPI  GI: Denies N/V, diarrhea, constipation, abdominal pain, change in bowel habits, melena or hematochezia  : Denies urinary frequency, urgency, incontinence, hematuria or dysuria. Skin: Denies rash, hives, or cyanosis  Musculoskeletal: Denies joint or muscle aches/pain  Neurological: Denies syncope or TIA-like symptoms.   Psychiatric: Denies anxiety, insomnia or depression     Past Medical History:   Diagnosis Date    Anxiety     BPH (benign prostatic hyperplasia) 2016    Chronic back pain     Colon polyps 2005    [address] ([facility] [address] [facility]     ED (erectile dysfunction)     History of skin cancer     Hyperlipidemia     Hypertension 2008    Hypothyroidism     Insomnia     Osteoarthritis     Spinal cord tumor 2011    Testosterone deficiency      Past Surgical History:   Procedure Laterality Date    CARDIAC CATHETERIZATION  05/07/2019    COLONOSCOPY  09/13/2012    Dr. Yolanda Dalton  04/16/2005    Dr. Vincenzo Wood, adenomatous polyps    COLONOSCOPY  06/25/2009    Dr. Yolanda Dalton  12/30/2015    Dr. Vincenzo Wood, recheck 2020    LUMBAR LAMINECTOMY  2008    L1-L2   [address]     NERVE BIOPSY  2011    Spine:  myxopapillary ependymoma     SKIN CANCER EXCISION  12/08/2004    SCC chest wall    TYMPANOSTOMY TUBE PLACEMENT Bilateral 2017    UPPER GASTROINTESTINAL ENDOSCOPY  09/13/2012    Dr. Vincenzo Wood     Family History   Problem Relation Age of Onset    Diabetes Mother     Breast Cancer Mother     Cancer Mother         bladder    Coronary Art Dis Father     Colon Cancer Father     Heart Disease Brother     No Known Problems Brother      Social History     Tobacco Use    Smoking status: Former Smoker     Packs/day: 1.00     Years: 22.00     Pack years: 22.00     Types: Cigarettes    Smokeless tobacco: Never Used   Substance Use Topics    Alcohol use: Yes     Comment: OCCASIONAL    Drug use: Not on file       No Known Allergies  Current Outpatient Medications   Medication Sig Dispense Refill    metoprolol tartrate (LOPRESSOR) 25 MG tablet Take 1 tablet by mouth 2 times daily 60 tablet 3    atorvastatin (LIPITOR) 40 MG tablet Take 1 tablet by mouth daily 90 tablet 3    sildenafil (VIAGRA) 50 MG tablet Take 1 tablet by mouth as needed for Erectile Dysfunction 30 tablet 3    hydrochlorothiazide (MICROZIDE) 12.5 MG capsule Take 1 capsule by mouth every morning 30 capsule 5    lisinopril (PRINIVIL;ZESTRIL) 40 MG tablet Take 1 tablet by mouth daily 30 tablet 2    clomiPRAMINE (ANAFRANIL) 25 MG capsule Take 1 capsule by mouth 3 times daily 90 brisk.  Neurological: No cranial nerve deficit. Psychiatric: He has a normal mood and affect. His speech is normal and behavior is normal.     Lab Review:   Lab Results   Component Value Date    TRIG 94 01/25/2019    HDL 39 01/25/2019    LDLCALC 61 01/25/2019    LABVLDL 19 01/25/2019     Lab Results   Component Value Date     05/06/2019    K 3.4 05/06/2019    CL 99 05/06/2019    CO2 27 05/06/2019    BUN 19 05/06/2019    CREATININE 1.4 05/06/2019    GLUCOSE 100 05/06/2019    CALCIUM 9.7 05/06/2019      Lab Results   Component Value Date    WBC 5.1 05/06/2019    HGB 15.5 05/06/2019    HCT 46.4 05/06/2019    MCV 79.7 (L) 05/06/2019     05/06/2019         I have reviewed any available labs, images, any stress test, LHC on this pt       Please cc this note to PCP    Assessment:    abnormal EKG, HR 83 / RBBB   HR 83 / RBBB & PVCs LAD with bifascicular block   c/o occas lightheadedness, no c/o presyncope or syncope / no c/o cp SOB edema    abnormal stress test   LHC 5/2019   Mild CAD and no intervention     HTN  Optimal      Plan:  On OMT  / BB statin ace HCTZ / add asa 81mg daily   Fu in 6 months   Blood work per PCP     Thanks for allowing me to participate in the care of this patient.   Please cc this note to PCP      COLLIN Christopher

## 2019-05-31 PROCEDURE — 0298T PR EXT ECG > 48HR TO 21 DAY REVIEW AND INTERPRETATN: CPT | Performed by: INTERNAL MEDICINE

## 2019-06-04 ENCOUNTER — PATIENT MESSAGE (OUTPATIENT)
Dept: CARDIOLOGY CLINIC | Age: 64
End: 2019-06-04

## 2019-06-04 DIAGNOSIS — R94.31 ABNORMAL EKG: ICD-10-CM

## 2019-06-05 NOTE — TELEPHONE ENCOUNTER
From: Maude Bobby  To: Eva Gonzalez MD  Sent: 6/4/2019 10:14 AM EDT  Subject: Test Results Question    Just wondering what my blockage percentage is in my lad.  We were told 50% but a follow up visit stated 20-25%

## 2019-07-05 ENCOUNTER — PATIENT MESSAGE (OUTPATIENT)
Dept: FAMILY MEDICINE CLINIC | Age: 64
End: 2019-07-05

## 2019-07-05 DIAGNOSIS — F51.04 PSYCHOPHYSIOLOGICAL INSOMNIA: Primary | ICD-10-CM

## 2019-07-05 RX ORDER — ZOLPIDEM TARTRATE 5 MG/1
5 TABLET ORAL NIGHTLY PRN
Qty: 30 TABLET | Refills: 2 | Status: SHIPPED | OUTPATIENT
Start: 2019-07-05 | End: 2019-09-26 | Stop reason: SDUPTHER

## 2019-07-11 ENCOUNTER — TELEPHONE (OUTPATIENT)
Dept: FAMILY MEDICINE CLINIC | Age: 64
End: 2019-07-11

## 2019-07-11 DIAGNOSIS — F51.04 PSYCHOPHYSIOLOGICAL INSOMNIA: ICD-10-CM

## 2019-07-11 NOTE — TELEPHONE ENCOUNTER
WRONG PHARMACY IT HAS TO GO TO [facility] WAS SENT TO [facility]. Leonora Powell

## 2019-08-28 RX ORDER — CLOMIPRAMINE HYDROCHLORIDE 25 MG/1
CAPSULE ORAL
Qty: 90 CAPSULE | Refills: 1 | Status: SHIPPED | OUTPATIENT
Start: 2019-08-28 | End: 2019-12-27 | Stop reason: SDUPTHER

## 2019-09-22 DIAGNOSIS — F51.04 PSYCHOPHYSIOLOGICAL INSOMNIA: ICD-10-CM

## 2019-09-23 RX ORDER — ZOLPIDEM TARTRATE 5 MG/1
TABLET ORAL
Qty: 30 TABLET | Refills: 1 | OUTPATIENT
Start: 2019-09-23

## 2019-09-26 ENCOUNTER — PATIENT MESSAGE (OUTPATIENT)
Dept: FAMILY MEDICINE CLINIC | Age: 64
End: 2019-09-26

## 2019-09-26 DIAGNOSIS — F51.04 PSYCHOPHYSIOLOGICAL INSOMNIA: ICD-10-CM

## 2019-09-26 RX ORDER — ZOLPIDEM TARTRATE 5 MG/1
5 TABLET ORAL NIGHTLY PRN
Qty: 30 TABLET | Refills: 0 | Status: SHIPPED | OUTPATIENT
Start: 2019-09-26 | End: 2019-10-03 | Stop reason: DRUGHIGH

## 2019-09-27 ENCOUNTER — TELEPHONE (OUTPATIENT)
Dept: FAMILY MEDICINE CLINIC | Age: 64
End: 2019-09-27

## 2019-09-27 ENCOUNTER — PATIENT MESSAGE (OUTPATIENT)
Dept: FAMILY MEDICINE CLINIC | Age: 64
End: 2019-09-27

## 2019-10-03 ENCOUNTER — OFFICE VISIT (OUTPATIENT)
Dept: FAMILY MEDICINE CLINIC | Age: 64
End: 2019-10-03
Payer: COMMERCIAL

## 2019-10-03 VITALS
DIASTOLIC BLOOD PRESSURE: 86 MMHG | HEART RATE: 68 BPM | HEIGHT: 73 IN | SYSTOLIC BLOOD PRESSURE: 138 MMHG | BODY MASS INDEX: 29.45 KG/M2 | WEIGHT: 222.2 LBS

## 2019-10-03 DIAGNOSIS — G47.00 INSOMNIA, UNSPECIFIED TYPE: ICD-10-CM

## 2019-10-03 DIAGNOSIS — I10 ESSENTIAL HYPERTENSION: Primary | ICD-10-CM

## 2019-10-03 LAB
A/G RATIO: 2.2 (ref 1.1–2.2)
ALBUMIN SERPL-MCNC: 4.6 G/DL (ref 3.4–5)
ALP BLD-CCNC: 73 U/L (ref 40–129)
ALT SERPL-CCNC: 37 U/L (ref 10–40)
ANION GAP SERPL CALCULATED.3IONS-SCNC: 15 MMOL/L (ref 3–16)
AST SERPL-CCNC: 28 U/L (ref 15–37)
BILIRUB SERPL-MCNC: 0.5 MG/DL (ref 0–1)
BUN BLDV-MCNC: 13 MG/DL (ref 7–20)
CALCIUM SERPL-MCNC: 9.3 MG/DL (ref 8.3–10.6)
CHLORIDE BLD-SCNC: 98 MMOL/L (ref 99–110)
CO2: 27 MMOL/L (ref 21–32)
CREAT SERPL-MCNC: 1.2 MG/DL (ref 0.8–1.3)
GFR AFRICAN AMERICAN: >60
GFR NON-AFRICAN AMERICAN: >60
GLOBULIN: 2.1 G/DL
GLUCOSE BLD-MCNC: 115 MG/DL (ref 70–99)
POTASSIUM SERPL-SCNC: 4.1 MMOL/L (ref 3.5–5.1)
SODIUM BLD-SCNC: 140 MMOL/L (ref 136–145)
TOTAL PROTEIN: 6.7 G/DL (ref 6.4–8.2)

## 2019-10-03 PROCEDURE — 36415 COLL VENOUS BLD VENIPUNCTURE: CPT | Performed by: FAMILY MEDICINE

## 2019-10-03 PROCEDURE — 99213 OFFICE O/P EST LOW 20 MIN: CPT | Performed by: FAMILY MEDICINE

## 2019-10-03 RX ORDER — ZOLPIDEM TARTRATE 10 MG/1
5-10 TABLET ORAL NIGHTLY PRN
Qty: 30 TABLET | Refills: 5 | Status: SHIPPED | OUTPATIENT
Start: 2019-10-03 | End: 2020-04-14 | Stop reason: SDUPTHER

## 2019-10-03 RX ORDER — METOPROLOL SUCCINATE 50 MG/1
50 TABLET, EXTENDED RELEASE ORAL DAILY
Qty: 90 TABLET | Refills: 1 | Status: SHIPPED | OUTPATIENT
Start: 2019-10-03 | End: 2020-01-10 | Stop reason: DRUGHIGH

## 2019-10-03 RX ORDER — SILDENAFIL CITRATE 20 MG/1
1-5 TABLET ORAL PRN
Refills: 3 | COMMUNITY
Start: 2019-08-13

## 2019-10-03 ASSESSMENT — ENCOUNTER SYMPTOMS
SHORTNESS OF BREATH: 0
COUGH: 0
VOMITING: 0
ABDOMINAL PAIN: 0
DIARRHEA: 0
WHEEZING: 0

## 2019-11-19 ENCOUNTER — OFFICE VISIT (OUTPATIENT)
Dept: CARDIOLOGY CLINIC | Age: 64
End: 2019-11-19
Payer: COMMERCIAL

## 2019-11-19 VITALS
SYSTOLIC BLOOD PRESSURE: 160 MMHG | BODY MASS INDEX: 29.21 KG/M2 | DIASTOLIC BLOOD PRESSURE: 80 MMHG | WEIGHT: 221.4 LBS | HEART RATE: 84 BPM

## 2019-11-19 DIAGNOSIS — I10 HYPERTENSION, UNSPECIFIED TYPE: ICD-10-CM

## 2019-11-19 DIAGNOSIS — E78.5 HYPERLIPIDEMIA, UNSPECIFIED HYPERLIPIDEMIA TYPE: ICD-10-CM

## 2019-11-19 DIAGNOSIS — Z92.89 H/O ANGIOGRAPHY: ICD-10-CM

## 2019-11-19 DIAGNOSIS — R94.31 ABNORMAL EKG: Primary | ICD-10-CM

## 2019-11-19 DIAGNOSIS — R94.39 ABNORMAL STRESS TEST: ICD-10-CM

## 2019-11-19 PROCEDURE — 99214 OFFICE O/P EST MOD 30 MIN: CPT | Performed by: INTERNAL MEDICINE

## 2019-12-13 RX ORDER — LISINOPRIL 40 MG/1
TABLET ORAL
Qty: 90 TABLET | Refills: 0 | Status: SHIPPED | OUTPATIENT
Start: 2019-12-13 | End: 2020-03-19 | Stop reason: SDUPTHER

## 2019-12-16 RX ORDER — LISINOPRIL 40 MG/1
TABLET ORAL
Qty: 90 TABLET | Refills: 0 | Status: CANCELLED | OUTPATIENT
Start: 2019-12-16

## 2019-12-27 ENCOUNTER — TELEPHONE (OUTPATIENT)
Dept: ORTHOPEDIC SURGERY | Age: 64
End: 2019-12-27

## 2019-12-27 RX ORDER — AMOXICILLIN AND CLAVULANATE POTASSIUM 875; 125 MG/1; MG/1
1 TABLET, FILM COATED ORAL 2 TIMES DAILY
Qty: 28 TABLET | Refills: 0 | Status: SHIPPED | OUTPATIENT
Start: 2019-12-27 | End: 2020-01-10

## 2019-12-27 RX ORDER — CLOMIPRAMINE HYDROCHLORIDE 25 MG/1
25 CAPSULE ORAL 3 TIMES DAILY
Qty: 90 CAPSULE | Refills: 0 | Status: SHIPPED | OUTPATIENT
Start: 2019-12-27 | End: 2020-02-14

## 2019-12-27 NOTE — TELEPHONE ENCOUNTER
Pt is calling to see if we can fill his Clomipramine 25 mg  #60 . We sent a message back telling the pharmacy that we did not have a pt by that name. He used to see Dr mUu Camacho

## 2020-01-10 ENCOUNTER — OFFICE VISIT (OUTPATIENT)
Dept: FAMILY MEDICINE CLINIC | Age: 65
End: 2020-01-10
Payer: COMMERCIAL

## 2020-01-10 VITALS
SYSTOLIC BLOOD PRESSURE: 140 MMHG | HEIGHT: 73 IN | OXYGEN SATURATION: 98 % | BODY MASS INDEX: 29.16 KG/M2 | RESPIRATION RATE: 16 BRPM | HEART RATE: 85 BPM | DIASTOLIC BLOOD PRESSURE: 88 MMHG | WEIGHT: 220 LBS

## 2020-01-10 PROCEDURE — 99213 OFFICE O/P EST LOW 20 MIN: CPT | Performed by: FAMILY MEDICINE

## 2020-01-10 RX ORDER — CLOBETASOL PROPIONATE 0.5 MG/G
CREAM TOPICAL 2 TIMES DAILY
Qty: 120 G | Refills: 2 | Status: SHIPPED | OUTPATIENT
Start: 2020-01-10 | End: 2020-04-13 | Stop reason: SDUPTHER

## 2020-01-10 RX ORDER — METOPROLOL SUCCINATE 100 MG/1
100 TABLET, EXTENDED RELEASE ORAL NIGHTLY
Qty: 90 TABLET | Refills: 0 | Status: SHIPPED | OUTPATIENT
Start: 2020-01-10 | End: 2020-04-13 | Stop reason: SDUPTHER

## 2020-01-10 RX ORDER — TRIAMCINOLONE ACETONIDE 1 MG/G
CREAM TOPICAL 2 TIMES DAILY
COMMUNITY
End: 2020-01-10 | Stop reason: SDUPTHER

## 2020-01-10 RX ORDER — CLOBETASOL PROPIONATE 0.5 MG/G
CREAM TOPICAL 2 TIMES DAILY
COMMUNITY
End: 2020-01-10 | Stop reason: SDUPTHER

## 2020-01-10 RX ORDER — TRIAMCINOLONE ACETONIDE 1 MG/G
CREAM TOPICAL 2 TIMES DAILY
Qty: 80 G | Refills: 2 | Status: SHIPPED | OUTPATIENT
Start: 2020-01-10 | End: 2020-04-13 | Stop reason: SDUPTHER

## 2020-01-10 RX ORDER — METOPROLOL SUCCINATE 100 MG/1
100 TABLET, EXTENDED RELEASE ORAL NIGHTLY
COMMUNITY
End: 2020-01-10 | Stop reason: SDUPTHER

## 2020-01-10 ASSESSMENT — PATIENT HEALTH QUESTIONNAIRE - PHQ9
SUM OF ALL RESPONSES TO PHQ9 QUESTIONS 1 & 2: 0
SUM OF ALL RESPONSES TO PHQ QUESTIONS 1-9: 0
SUM OF ALL RESPONSES TO PHQ QUESTIONS 1-9: 0
2. FEELING DOWN, DEPRESSED OR HOPELESS: 0
1. LITTLE INTEREST OR PLEASURE IN DOING THINGS: 0

## 2020-01-10 NOTE — PROGRESS NOTES
Subjective:      Patient ID: Juan Daniel Mosquera is a 71 y.o. male. Chief Complaint   Patient presents with    Other     pt has a skin condition that he needs 2 medicated creams sent in for him. he has not had this for a while now, having an outbreak   443  HPI     Dermatomyositis  Has gotten worse with work stress. Only had skin sx no muscle/vascular problems. HTN  Had to stopped  his HCTZ last visit. His metoprolol has been increased in the past to try to control his blood pressure when the HCTZ was stopped. These cardiology. Prior to Visit Medications    Medication Sig Taking? Authorizing Provider   triamcinolone (KENALOG) 0.1 % cream Apply topically 2 times daily Apply topically 2 times daily. Yes Benita Triplett,    clobetasol (TEMOVATE) 0.05 % cream Apply topically 2 times daily Apply topically 2 times daily. Yes Benita Triplett DO   metoprolol succinate (TOPROL XL) 100 MG extended release tablet Take 1 tablet by mouth nightly Yes Benita Triplett DO   clomiPRAMINE (ANAFRANIL) 25 MG capsule Take 1 capsule by mouth three times daily Yes SAMPSON Vargas - CNP   lisinopril (PRINIVIL;ZESTRIL) 40 MG tablet TAKE 1 TABLET BY MOUTH ONE TIME A DAY Yes SAMPSON Vargas CNP   sildenafil (REVATIO) 20 MG tablet Take 1-5 tablets by mouth as needed Yes Historical Provider, MD   zolpidem (AMBIEN) 10 MG tablet Take 0.5-1 tablets by mouth nightly as needed for Sleep for up to 180 days. Yes Ken Pinzon DO   atorvastatin (LIPITOR) 40 MG tablet Take 1 tablet by mouth daily Yes SAMPSON Gould CNP   thyroid (ARMOUR) 130 MG tablet Take 130 mg by mouth daily Yes Historical Provider, MD   tamsulosin (FLOMAX) 0.4 MG capsule Take 0.4 mg by mouth daily Yes Historical Provider, MD   Testosterone 20 % CREA by Does not apply route. . Yes Historical Provider, MD   ibuprofen (ADVIL;MOTRIN) 200 MG tablet Take 200 mg by mouth 2 times daily Yes Historical Provider, MD   Cholecalciferol (VITAMIN D3) 5000 units TABS Take 1 tablet by mouth daily Yes Historical Provider, MD   Nutritional Supplements (DHEA PO) Take 35 mg by mouth daily Yes Historical Provider, MD   Cyanocobalamin (VITAMIN B-12) 1000 MCG extended release tablet Take 1,000 mcg by mouth daily Yes Historical Provider, MD     Review of Systems    Objective:   Physical Exam  Vitals signs and nursing note reviewed. Constitutional:       General: He is not in acute distress. Appearance: He is well-developed. He is not diaphoretic. Eyes:      General: No scleral icterus. Right eye: No discharge. Left eye: No discharge. Conjunctiva/sclera: Conjunctivae normal.   Neck:      Musculoskeletal: Neck supple. Thyroid: No thyroid mass or thyromegaly. Vascular: No carotid bruit or JVD. Trachea: Trachea and phonation normal. No tracheal tenderness or tracheal deviation. Cardiovascular:      Rate and Rhythm: Normal rate and regular rhythm. Heart sounds: Normal heart sounds, S1 normal and S2 normal. Heart sounds not distant. No murmur. No friction rub. No gallop. No S3 or S4 sounds. Pulmonary:      Effort: Pulmonary effort is normal. No respiratory distress. Breath sounds: Normal breath sounds. No stridor. No decreased breath sounds, wheezing, rhonchi or rales. Lymphadenopathy:      Head:      Right side of head: No submandibular or tonsillar adenopathy. Left side of head: No submandibular or tonsillar adenopathy. Cervical: No cervical adenopathy. Right cervical: No superficial, deep or posterior cervical adenopathy. Left cervical: No superficial, deep or posterior cervical adenopathy. Skin:     General: Skin is warm and dry. Coloration: Skin is not pale. Neurological:      Mental Status: He is alert. Deep Tendon Reflexes:      Reflex Scores:       Patellar reflexes are 2+ on the right side and 2+ on the left side.   Psychiatric:         Speech: Speech normal.         Behavior: pressure medicines should be increased. The doctor should be called if the upper number (systolic blood pressure) is more than 180 once or more than 160 1/3 of the time. Call if the upper number is less than 90 or if less than 100 and you are light headed when you stand up. Call if the lower number (diastolic blood pressure) is more than 100. A much lower bottom number even in the 40's is not usually urgent.       Sheri Rdoriguez, DO

## 2020-01-25 PROBLEM — I45.10 RIGHT BUNDLE BRANCH BLOCK: Status: ACTIVE | Noted: 2019-03-07

## 2020-01-25 PROBLEM — I25.10 CORONARY ARTERY DISEASE INVOLVING NATIVE CORONARY ARTERY OF NATIVE HEART WITHOUT ANGINA PECTORIS: Chronic | Status: ACTIVE | Noted: 2019-05-10

## 2020-01-25 PROBLEM — I45.10 RIGHT BUNDLE BRANCH BLOCK: Chronic | Status: ACTIVE | Noted: 2019-03-07

## 2020-02-14 RX ORDER — CLOMIPRAMINE HYDROCHLORIDE 25 MG/1
CAPSULE ORAL
Qty: 90 CAPSULE | Refills: 0 | Status: SHIPPED | OUTPATIENT
Start: 2020-02-14 | End: 2020-04-09

## 2020-03-06 NOTE — PROGRESS NOTES
Johnathon Vigil   1955, 76 y.o. male   <[id number]>       Referring Provider: Jenny Garcia MD  Referral Type: In an order in [facility]    Reason for Visit: Evaluation of suspected change in hearing, tinnitus, or balance. ADULT AUDIOLOGIC EVALUATION      Johnathon Vigil is a 76 y.o. male seen today, 3/11/2020, for an initial audiologic evaluation. AUDIOLOGIC AND OTHER PERTINENT MEDICAL HISTORY:        Johnathon Book noted aural fullness and history of ear surgery. Patient reports long standing history of ear infections and ETD AU. He states he last had bilateral PE Tubes placed about 3 years ago. Patient notes he is usually able to \"pop\" ears by using the valsalva technique. He states his hearing is better in the left ear. Johnathon Book denied otalgia, otorrhea, tinnitus, dizziness, imbalance, history of falls, history of significant noise exposure, history of head trauma and family history of hearing loss. ASSESSMENT AND FINDINGS:       Otoscopy revealed: Clear ear canals bilaterally      RIGHT EAR:  Hearing Sensitivity: Mild mixed hearing loss. Speech Recognition Threshold: 40 dB HL  Word Recognition: Good (80-89%), based on NU-6 25-word list at 65 dBHL (MCL) using recorded speech stimuli. Tympanometry: Flat, no peak pressure or compliance, Type B tympanogram, with typical ear canal volume, consistent with middle ear fluid. Acoustic Reflexes: Ipsilateral: Did not test. Contralateral: Did not test.      LEFT EAR:  Hearing Sensitivity: Normal hearing sensitivity Moderate sensorineural hearing loss  Speech Recognition Threshold: 25 dB HL  Word Recognition: Excellent (%), based on NU-6 25-word list at 55 dBHL using recorded speech stimuli. Tympanometry: Negative peak pressure with normal compliance, Type C tympanogram, consistent with ETD/history of otitis media. Acoustic Reflexes: Ipsilateral: Did not test. Contralateral: Did not test.      Reliability: Good  Transducer:  Inserts    See scanned audiogram dated 3/11/2020 for results. PATIENT EDUCATION:       The following items were discussed with the patient:    - Good Communication Strategies,    - Hearing Loss and Hearing Aids   - Noise-Induced Hearing Loss and use of Hearing Protection Devices (HPDs)    Educational information was shared in the After Visit Summary. RECOMMENDATIONS:                                                                                                                                                                                                                                                                      The following items are recommended based on patient report and results from today's appointment:   - Continue medical follow-up with Sam Rizzo MD.   - Retest hearing as medically indicated and/or sooner if a change in hearing is noted. - If desired, schedule a Hearing Aid Evaluation (HAE) appointment to discuss hearing aid options. ,    - Utilize \"Good Communication Strategies\" as discussed to assist in speech understanding with communication partners. ,   - Maintain a sound enriched environment to assist in the management of tinnitus symptoms.     - Use hearing protection devices (HPDs), such as protective ear muffs and ear plugs, when exposed to dangerous sound levels.          Mal Herrera  Audiologist        Chart CC'd to: Sam Rizzo MD      Degree of   Hearing Sensitivity dB Range   Within Normal Limits (WNL) 0 - 20   Mild 20 - 40   Moderate 40 - 55   Moderately-Severe 55 - 70   Severe 70 - 90   Profound 90 +

## 2020-03-11 ENCOUNTER — OFFICE VISIT (OUTPATIENT)
Dept: ENT CLINIC | Age: 65
End: 2020-03-11
Payer: COMMERCIAL

## 2020-03-11 ENCOUNTER — PROCEDURE VISIT (OUTPATIENT)
Dept: AUDIOLOGY | Age: 65
End: 2020-03-11
Payer: COMMERCIAL

## 2020-03-11 VITALS
HEIGHT: 73 IN | BODY MASS INDEX: 28.89 KG/M2 | WEIGHT: 218 LBS | TEMPERATURE: 98 F | DIASTOLIC BLOOD PRESSURE: 106 MMHG | SYSTOLIC BLOOD PRESSURE: 189 MMHG | HEART RATE: 82 BPM

## 2020-03-11 PROCEDURE — 92557 COMPREHENSIVE HEARING TEST: CPT | Performed by: AUDIOLOGIST

## 2020-03-11 PROCEDURE — 99203 OFFICE O/P NEW LOW 30 MIN: CPT | Performed by: OTOLARYNGOLOGY

## 2020-03-11 PROCEDURE — 92567 TYMPANOMETRY: CPT | Performed by: AUDIOLOGIST

## 2020-03-11 NOTE — PATIENT INSTRUCTIONS
Good Communication Strategies    Communication can be challenging for anyone, but can be especially difficult for those with some degree of hearing loss. While we may not be able to control every factor that may lead to difficulty with communication, there are Good Communication Strategies that we can all use in our day-to-day lives. Communication takes both parties working together for it to be successful. Tips as a Listener:   1. Control your environment. It is important to limit the amount of background noise in the room when possible. You should also consider having a good light source in the room to best see the other person. 2. Ask for clarification. Instead of saying \"What?\", you can use parts of what you heard to make a new question. For example, if you heard the word \"Thursday\" but not the rest of the week, you may ask \"What was that about Thursday? \" or \"What did you want to do Thursday? \". This shows the person talking that you are listening and will help them better explain what they are saying. 3. Be an advocate for yourself. If you are hearing but not understanding, tell the other person \"I can hear you, but I need you to slow down when you speak. \"  Or if someone is facing the other direction, say \"I cannot hear you when you are not looking at me when we talk. \"       Tips as a Talker:   - Sit or stand 3 to 6 feet away to maximize audibility         -- It is unrealistic to believe someone else will fully hear your message if you are speaking from across the room or in a different room in the house   - Stay at eye level to help with visual cues   - Make sure you have the persons attention before speaking   - Use facial expressions and gestures to accentuate your message   - Raise your voice slightly (do not scream)   - Speak slowly and distinctly   - Use short, simple sentences   - Rephrase your words if the person is having a hard time understanding you    - To avoid distortion, dont speak a \"Hearing Aid Evaluation\" with an audiologist to discuss your lifestyle, features of hearing aid technology, and styles of hearing aids available. It is recommended that you contact your insurance company to determine if you have a hearing aid benefit, as this may dictate who you can see for these services. · Have hearing tests as your doctor suggests. They can show whether your hearing has changed. Your hearing aid may need to be adjusted. · Use other assistive devices as needed. These may include:  ? Telephone amplifiers and hearing aids that can connect to a television, stereo, radio, or microphone. ? Devices that use lights or vibrations. These alert you to the doorbell, a ringing telephone, or a baby monitor. ? Television closed-captioning. This shows the words at the bottom of the screen. Most new TVs can do this. ? TTY (text telephone). This lets you type messages back and forth on the telephone instead of talking or listening. These devices are also called TDD. When messages are typed on the keyboard, they are sent over the phone line to a receiving TTY. The message is shown on a monitor. · Use pagers, fax machines, text, and email if it is hard for you to communicate by telephone. · Try to learn a listening technique called speech-reading. It is not lip-reading. You pay attention to people's gestures, expressions, posture, and tone of voice. These clues can help you understand what a person is saying. Face the person you are talking to, and have him or her face you. Make sure the lighting is good. You need to see the other person's face clearly. · Think about counseling if you need help to adjust to your hearing loss. When should you call for help? Watch closely for changes in your health, and be sure to contact your doctor if:    · You think your hearing is getting worse. · You have new symptoms, such as dizziness or nausea.            Noise-Induced Hearing Loss  What it is, and what you can do to prevent it      Exposure to loud sounds, in an occupational setting or recreational, can cause permanent hearing loss. Sound is measured in decibels (dB). Noise-induced hearing loss is the ONLY type of preventable hearing loss. Hearing loss related to noise exposure can occur at any age. There are small sensory cells, called inner and outer hair cells, within the inner ear (cochlea). These cells process the loudness (intensity) and pitch (frequency) of sound and send the signal to the brain via our auditory nerve (vestibulocochlear nerve, cranial nerve VIII). When these cells are damaged, they can result in permanent hearing loss and/or tinnitus. The hair cells responsible for high frequency sounds, like birds chirping, are most likely to be damaged due to loud sounds. The high frequency sounds are also very important for our clarity and understanding of speech. OCCUPATIONAL NOISE EXPOSURE RECREATIONAL NOISE EXPOSURE   Some jobs may have exposure to loud sounds in the workplace. These jobs may include but are not limited to:  [employer]   Construction   Welding   Landscaping   Hairdressing/hairstyling   Musicians  [employer]    ... And more! Many activities outside of work may cause permanent hearing loss. These activities may include but are not limited to:  Lawnmowers, leaf blowers  [employer] (such as pigs squealing)   Chainsaws and other power tools  [employer] musical instruments and/or singing   Listening to music too loudly - at concerts, through stereo, through ear buds or headphones   Attending sporting events   Attending fireworks shows or using fireworks at home  [employer] of firearms   . .. And more! REDUCE OR PROTECT YOUR EARS FROM NOISE EXPOSURE    To do your best to avoid noise-induced hearing loss, here are some tips:   Limit exposure to loud sounds. 85 dB (decibels) is safe for 8 hours.   As

## 2020-03-19 ENCOUNTER — PATIENT MESSAGE (OUTPATIENT)
Dept: FAMILY MEDICINE CLINIC | Age: 65
End: 2020-03-19

## 2020-03-19 RX ORDER — LISINOPRIL 40 MG/1
TABLET ORAL
Qty: 90 TABLET | Refills: 0 | Status: SHIPPED | OUTPATIENT
Start: 2020-03-19 | End: 2020-04-13 | Stop reason: SDUPTHER

## 2020-03-19 NOTE — TELEPHONE ENCOUNTER
From: Alexandr Mehta  To: Severiano Crick, DO  Sent: 3/19/2020 9:51 AM EDT  Subject: Prescription Question    Not sure why my script for Lisinopril is being denied, info please?

## 2020-03-20 NOTE — TELEPHONE ENCOUNTER
Patient was requested to schedule 2/7/2020. He was last seen on 1/11/2020. Have patient schedule a visit by 4/12/2020 for future for refills.

## 2020-03-23 ENCOUNTER — PATIENT MESSAGE (OUTPATIENT)
Dept: FAMILY MEDICINE CLINIC | Age: 65
End: 2020-03-23

## 2020-03-23 NOTE — TELEPHONE ENCOUNTER
From: Yamila Mayo  To: Siria Farooq DO  Sent: 3/23/2020 11:06 AM EDT  Subject: Non-Urgent Medical Question    I guess I'm confused about the voice mail I received for making a follow up appointment. I think I was in to see Dayday Hernandes a few months ago. So in order to continue getting my scripts I need to see him soon?

## 2020-04-09 RX ORDER — CLOMIPRAMINE HYDROCHLORIDE 25 MG/1
CAPSULE ORAL
Qty: 30 CAPSULE | Refills: 0 | Status: SHIPPED | OUTPATIENT
Start: 2020-04-09 | End: 2020-04-13 | Stop reason: SDUPTHER

## 2020-04-13 ENCOUNTER — VIRTUAL VISIT (OUTPATIENT)
Dept: FAMILY MEDICINE CLINIC | Age: 65
End: 2020-04-13
Payer: COMMERCIAL

## 2020-04-13 VITALS — HEIGHT: 73 IN | BODY MASS INDEX: 28.76 KG/M2

## 2020-04-13 PROCEDURE — 99214 OFFICE O/P EST MOD 30 MIN: CPT | Performed by: NURSE PRACTITIONER

## 2020-04-13 RX ORDER — ZOLPIDEM TARTRATE 10 MG/1
5-10 TABLET ORAL NIGHTLY PRN
COMMUNITY
End: 2020-10-19

## 2020-04-13 RX ORDER — ZOSTER VACCINE RECOMBINANT, ADJUVANTED 50 MCG/0.5
0.5 KIT INTRAMUSCULAR SEE ADMIN INSTRUCTIONS
Qty: 0.5 ML | Refills: 0 | Status: CANCELLED | OUTPATIENT
Start: 2020-04-13 | End: 2020-10-10

## 2020-04-13 RX ORDER — METOPROLOL SUCCINATE 100 MG/1
100 TABLET, EXTENDED RELEASE ORAL NIGHTLY
Qty: 90 TABLET | Refills: 0 | Status: SHIPPED
Start: 2020-04-13 | End: 2020-12-03 | Stop reason: ALTCHOICE

## 2020-04-13 RX ORDER — TRIAMCINOLONE ACETONIDE 1 MG/G
CREAM TOPICAL 2 TIMES DAILY
Qty: 80 G | Refills: 2 | Status: SHIPPED | OUTPATIENT
Start: 2020-04-13 | End: 2022-09-09 | Stop reason: ALTCHOICE

## 2020-04-13 RX ORDER — ZOSTER VACCINE RECOMBINANT, ADJUVANTED 50 MCG/0.5
0.5 KIT INTRAMUSCULAR SEE ADMIN INSTRUCTIONS
Qty: 0.5 ML | Refills: 0 | Status: SHIPPED | OUTPATIENT
Start: 2020-04-13 | End: 2020-04-14

## 2020-04-13 RX ORDER — ATORVASTATIN CALCIUM 40 MG/1
40 TABLET, FILM COATED ORAL DAILY
Qty: 90 TABLET | Refills: 0 | Status: SHIPPED | OUTPATIENT
Start: 2020-04-13 | End: 2020-08-13

## 2020-04-13 RX ORDER — CLOBETASOL PROPIONATE 0.5 MG/G
CREAM TOPICAL 2 TIMES DAILY
Qty: 120 G | Refills: 2 | Status: SHIPPED | OUTPATIENT
Start: 2020-04-13 | End: 2021-06-14 | Stop reason: SDUPTHER

## 2020-04-13 RX ORDER — LISINOPRIL 40 MG/1
TABLET ORAL
Qty: 90 TABLET | Refills: 0 | Status: SHIPPED
Start: 2020-06-13 | End: 2020-12-03 | Stop reason: SINTOL

## 2020-04-13 RX ORDER — CLOMIPRAMINE HYDROCHLORIDE 25 MG/1
50 CAPSULE ORAL NIGHTLY
Qty: 180 CAPSULE | Refills: 3 | Status: SHIPPED | OUTPATIENT
Start: 2020-04-13 | End: 2020-05-08 | Stop reason: SDUPTHER

## 2020-04-13 NOTE — PROGRESS NOTES
2020     Karan Corey (:  1955) is a 76 y.o. male, here for evaluation of the following medical concerns:    HPI   Chief Complaint   Patient presents with    Hypertension     HTN ROUTINE FOLLOW UP     Treatment Adherence:   Medication compliance:  compliant all of the time  Diet compliance:  compliant all of the time  Weight trend: divya today  Current exercise: LOTS OF WALKING  Barriers: none    Hypertension:  Home blood pressure monitoring: Yes IT IS ALWAYS PRETTY GOOD EXCEPT . Patient denies chest pain, shortness of breath, headache, lightheadedness, blurred vision, peripheral edema, palpitations, dry cough and fatigue. Antihypertensive medication side effects: no medication side effects noted. Use of agents associated with hypertension: IBUPROFEN     ANXIETY  WELL CONTROLLED - NEEDS REFILLS ANAFRANIL    RASH   DR DIAS PRESCRIBED CLOBETASOL AND IT HAS HELPED - NEEDS  A REFILL                                      Sodium (mmol/L)   Date Value   10/03/2019 140     Glucose (mg/dL)   Date Value   10/03/2019 115 (H)      Potassium (mmol/L)   Date Value   10/03/2019 4.1    CREATININE (mg/dL)   Date Value   10/03/2019 1.2         Hyperlipidemia:  No new myalgias or GI upset on atorvastatin (Lipitor). Lab Results   Component Value Date    CHOL 119 2019    TRIG 94 2019    HDL 39 (L) 2019    LDLCALC 61 2019     Lab Results   Component Value Date    ALT 37 10/03/2019    AST 28 10/03/2019          Review of Systems   Cardiovascular: Negative. Skin: Positive for rash. Psychiatric/Behavioral: Positive for sleep disturbance. WELL CONTROLLED ANXIETY       Prior to Visit Medications    Medication Sig Taking?  Authorizing Provider   clomiPRAMINE (ANAFRANIL) 25 MG capsule TAKE 1 CAPSULE BY MOUTH 3 TIMES A DAY  Pedro Dias DO   lisinopril (PRINIVIL;ZESTRIL) 40 MG tablet TAKE 1 TABLET BY MOUTH ONE TIME A DAY  Pedro Dias DO   triamcinolone (KENALOG) 0.1 % cream Apply topically 2 times daily Apply topically 2 times daily. Isabela Arch, DO   clobetasol (TEMOVATE) 0.05 % cream Apply topically 2 times daily Apply topically 2 times daily. Isabela Arch, DO   metoprolol succinate (TOPROL XL) 100 MG extended release tablet Take 1 tablet by mouth nightly  Isabela Arch, DO   sildenafil (REVATIO) 20 MG tablet Take 1-5 tablets by mouth as needed  Historical Provider, MD   atorvastatin (LIPITOR) 40 MG tablet Take 1 tablet by mouth daily  SAMPSON Ybarra CNP   thyroid (ARMOUR) 130 MG tablet Take 130 mg by mouth daily  Historical Provider, MD   tamsulosin (FLOMAX) 0.4 MG capsule Take 0.4 mg by mouth daily  Historical Provider, MD   Testosterone 20 % CREA by Does not apply route. Osmar Sanchez Historical Provider, MD   ibuprofen (ADVIL;MOTRIN) 200 MG tablet Take 200 mg by mouth 2 times daily  Historical Provider, MD   Cholecalciferol (VITAMIN D3) 5000 units TABS Take 1 tablet by mouth daily  Historical Provider, MD   Nutritional Supplements (DHEA PO) Take 35 mg by mouth daily  Historical Provider, MD   Cyanocobalamin (VITAMIN B-12) 1000 MCG extended release tablet Take 1,000 mcg by mouth daily  Historical Provider, MD        Social History     Tobacco Use    Smoking status: Former Smoker     Packs/day: 1.00     Years: 22.00     Pack years: 22.00     Types: Cigarettes     Last attempt to quit: 2005     Years since quitting: 15.2    Smokeless tobacco: Never Used   Substance Use Topics    Alcohol use: Yes     Comment: OCCASIONAL        There were no vitals filed for this visit. Estimated body mass index is 28.76 kg/m² as calculated from the following:    Height as of 3/11/20: 6' 1\" (1.854 m). Weight as of 3/11/20: 218 lb (98.9 kg). Physical Exam  Constitutional:       General: He is awake. He is not in acute distress. Appearance: Normal appearance. He is well-developed, well-groomed and normal weight. He is not ill-appearing, toxic-appearing or diaphoretic.    Neurological:      Mental

## 2020-04-14 RX ORDER — ZOLPIDEM TARTRATE 10 MG/1
5-10 TABLET ORAL NIGHTLY PRN
Qty: 30 TABLET | Refills: 5 | Status: SHIPPED | OUTPATIENT
Start: 2020-04-14 | End: 2020-10-11

## 2020-04-14 RX ORDER — ZOLPIDEM TARTRATE 10 MG/1
5-10 TABLET ORAL NIGHTLY PRN
OUTPATIENT
Start: 2020-04-14

## 2020-05-06 ENCOUNTER — TELEPHONE (OUTPATIENT)
Dept: FAMILY MEDICINE CLINIC | Age: 65
End: 2020-05-06

## 2020-05-06 NOTE — TELEPHONE ENCOUNTER
[facility] is calling because they need clarification on the medication METOPROLOL 100 EXTENDED RELEASE TABLET - PATIENT IS STATING THEY TAKE 150 EXTENDED RELEASE. Please give them a call back.      [address]

## 2020-05-08 RX ORDER — CLOMIPRAMINE HYDROCHLORIDE 25 MG/1
75 CAPSULE ORAL NIGHTLY
Qty: 270 CAPSULE | Refills: 3 | Status: SHIPPED | OUTPATIENT
Start: 2020-05-08 | End: 2021-06-14 | Stop reason: SDUPTHER

## 2020-07-14 RX ORDER — METOPROLOL SUCCINATE 50 MG/1
TABLET, EXTENDED RELEASE ORAL
Qty: 270 TABLET | Refills: 0 | Status: SHIPPED | OUTPATIENT
Start: 2020-07-14 | End: 2020-09-25

## 2020-08-13 RX ORDER — ATORVASTATIN CALCIUM 40 MG/1
TABLET, FILM COATED ORAL
Qty: 90 TABLET | Refills: 0 | Status: SHIPPED | OUTPATIENT
Start: 2020-08-13 | End: 2020-11-17

## 2020-09-25 RX ORDER — METOPROLOL SUCCINATE 50 MG/1
TABLET, EXTENDED RELEASE ORAL
Qty: 270 TABLET | Refills: 0 | Status: SHIPPED | OUTPATIENT
Start: 2020-09-25 | End: 2020-12-03 | Stop reason: ALTCHOICE

## 2020-11-17 RX ORDER — ATORVASTATIN CALCIUM 40 MG/1
TABLET, FILM COATED ORAL
Qty: 90 TABLET | Refills: 0 | Status: SHIPPED | OUTPATIENT
Start: 2020-11-17 | End: 2021-02-15

## 2020-11-18 RX ORDER — ZOLPIDEM TARTRATE 10 MG/1
TABLET ORAL
Qty: 30 TABLET | Refills: 0 | Status: SHIPPED | OUTPATIENT
Start: 2020-11-18 | End: 2020-12-17

## 2020-12-03 ENCOUNTER — OFFICE VISIT (OUTPATIENT)
Dept: FAMILY MEDICINE CLINIC | Age: 65
End: 2020-12-03
Payer: COMMERCIAL

## 2020-12-03 VITALS
TEMPERATURE: 97 F | DIASTOLIC BLOOD PRESSURE: 84 MMHG | RESPIRATION RATE: 20 BRPM | WEIGHT: 213 LBS | HEART RATE: 74 BPM | HEIGHT: 73 IN | BODY MASS INDEX: 28.23 KG/M2 | OXYGEN SATURATION: 95 % | SYSTOLIC BLOOD PRESSURE: 126 MMHG

## 2020-12-03 PROCEDURE — 99215 OFFICE O/P EST HI 40 MIN: CPT | Performed by: FAMILY MEDICINE

## 2020-12-03 RX ORDER — METOPROLOL TARTRATE 50 MG/1
50 TABLET, FILM COATED ORAL
COMMUNITY
End: 2021-01-11 | Stop reason: SDUPTHER

## 2020-12-03 ASSESSMENT — ENCOUNTER SYMPTOMS: CHEST TIGHTNESS: 0

## 2020-12-03 NOTE — PATIENT INSTRUCTIONS
Prabha Sorto was seen today for hypertension, hyperlipidemia and thyroid problem. Diagnoses and all orders for this visit:    Essential hypertension  -     COMPREHENSIVE METABOLIC PANEL; Future  -     Good control.  -     Continue meds and lifestyle control. Mixed hyperlipidemia  -     Lipid, Fasting; Future  -     COMPREHENSIVE METABOLIC PANEL; Future  -     HOMOCYSTEINE, SERUM; Future  -     Good control.  -     Continue meds and lifestyle control. Coronary artery disease involving native coronary artery of native heart without angina pectoris  -     Lipid, Fasting; Future  -     Good control.  -     Continue meds and lifestyle control. Vitamin D deficiency  -     VITAMIN D 25 HYDROXY; Future  -     Good control.  -     Continue meds and lifestyle control. The risk is likely to increase for COVID-19 infection as the reopening occurs. This is because while some people are being careful with social distancing masks and social isolation other people are totally ignoring it. Preventing the Spread of Coronavirus Disease 2019 in Homes and Residential Communities   For the most recent information go to [email or web address]    Prevention steps for People with confirmed or suspected COVID-19 (including persons under investigation) who do not need to be hospitalized  and   People with confirmed COVID-19 who were hospitalized and determined to be medically stable to go home    Your healthcare provider and public health staff will evaluate whether you can be cared for at home. If it is determined that you do not need to be hospitalized and can be isolated at home, you will be monitored by staff from your local or state health department. You should follow the prevention steps below until a healthcare provider or local or state health department says you can return to your normal activities.   Stay home except to get medical care  People who are mildly ill when you cough or sneeze. Throw used tissues in a lined trash can. Immediately wash your hands with soap and water for at least 20 seconds or, if soap and water are not available, clean your hands with an alcohol-based hand  that contains at least 60% alcohol. Clean your hands often  Wash your hands often with soap and water for at least 20 seconds, especially after blowing your nose, coughing, or sneezing; going to the bathroom; and before eating or preparing food. If soap and water are not readily available, use an alcohol-based hand  with at least 60% alcohol, covering all surfaces of your hands and rubbing them together until they feel dry. Soap and water are the best option if hands are visibly dirty. Avoid touching your eyes, nose, and mouth with unwashed hands. Avoid sharing personal household items  You should not share dishes, drinking glasses, cups, eating utensils, towels, or bedding with other people or pets in your home. After using these items, they should be washed thoroughly with soap and water. Clean all high-touch surfaces everyday  High touch surfaces include counters, tabletops, doorknobs, bathroom fixtures, toilets, phones, keyboards, tablets, and bedside tables. Also, clean any surfaces that may have blood, stool, or body fluids on them. Use a household cleaning spray or wipe, according to the label instructions. Labels contain instructions for safe and effective use of the cleaning product including precautions you should take when applying the product, such as wearing gloves and making sure you have good ventilation during use of the product. Monitor your symptoms  Seek prompt medical attention if your illness is worsening (e.g., difficulty breathing). Before seeking care, call your healthcare provider and tell them that you have, or are being evaluated for, COVID-19. Put on a facemask before you enter the facility.  These steps will help the healthcare providers office to keep other people in the office or waiting room from getting infected or exposed. Ask your healthcare provider to call the local or state health department. Persons who are placed under active monitoring or facilitated self-monitoring should follow instructions provided by their local health department or occupational health professionals, as appropriate. When working with your local health department check their available hours. If you have a medical emergency and need to call 911, notify the dispatch personnel that you have, or are being evaluated for COVID-19. If possible, put on a facemask before emergency medical services arrive. Discontinuing home isolation  Patients with confirmed COVID-19 should remain under home isolation precautions until the risk of secondary transmission to others is thought to be low. The decision to discontinue home isolation precautions should be made on a case-by-case basis, in consultation with healthcare providers and state and [facility] health departments. Use Tylenol NOT Ibuprofen/Aleve/aspirin for pain or fevers. There is a theoretical decrease in the body's ability to fight the virus when you are infected if you are on NSAIDs. The FDA has said that this information is not yet proven. The newest evidence suggest that wearing a mask in public may be beneficial if you remember that the outside of it is contaminated and treat it accordingly. It also helps prevent spread if someone has an asymptomatic or presymptomatic case and does not know it yet. Even cloth masks can be beneficial.    Advance Care Planning  People with COVID-19 may have no symptoms, mild symptoms, such as fever, cough, and shortness of breath or they may have more severe illness, developing severe and fatal pneumonia.   As a result, Advance Care Planning with attention to naming a health care decision maker (someone you trust to make healthcare decisions for you if you could not speak for yourself) and sharing other health care preferences is important BEFORE a possible health crisis. Please contact your Primary Care Provider to discuss Advance Care Planning. Vitamin D by mouth and vitamin C intravenously are being used as part of the treatment regimen for COVID-19 in some hospitalized patients. Take vitamin D 5000 IU 5x/day. Also start vitamin C 500 mg daily. Both of these should be continued for the duration of the moderate risk portion of the COVID-19 pandemic expected to be 2 years according to the [employer]. Continue wearing a mask when around people except those living in the same house who are not infected with or heavily exposed to COVID-19, handwashing/hand gel use, social distancing, and social isolation for nonessential activities. IF you develop ANY respiratory infection symptoms (not just allergy symptoms) suggestive of COVID-19 start the recommendations below: Instructions for Respiratory Infections (SAVE THIS SHEET)    For the first 7-14 days of symptoms follow instructions below, even before being seen in the office or even during treatment with antibiotics, until symptom free. 1. Water: Drink 1 ounce of water for every 2 pounds of body weight for adults, You need 96 oz Ounces of water/fluids per day. This will loosen mucus in the head and chest & improve the weak feeling of dehydration, allow the body to get germ fighting resources to the infection. Half of fluids can be juice or sugar free Crystal Light. Don't count drinks with caffeine, alcohol or carbonation. Infants can have Pedialyte liquid or freezer pops. Avoid salt and sports drinks if you have high Blood Pressure, swelling in the feet or ankles or have heart problems. 2. Humidity: Humidify the air to 35-50% ( or until the windows fog over slightly).  Can use a humidifier, vaporizer, boil water on the stove or put a coffee can full of water on the heater vents. This will loosen mucus from infections and allergies. 3. Sleep: Get 8-10 hours a night and rest during the evening after work or school. If you have trouble sleeping, adults can take Melatonin 5mg up to 2 tabs at bedtime ( not for children or pregnant women). If Mono is suspected then sleep during 9PM to 9AM time span (if possible.)  4. Cough: Take cough medicines with Guaifenesin ( to loosen chest or head congestion) and Dextromethorphan ( to decrease excess cough). Robitussin D.M. Syrup every 4-6 hrs OR Mucinex D. M. pills OR Delsym DM syrup twice a day. Use the pediatric formulations for children over 6 months making sure they are alcohol & sugar free for children, pregnant women, and diabetics. 5. Pain And Fevers: Take Acetaminophen ( Tylenol) for fevers, aches, and headaches. 2-500 mg every 8 hours for adults. Appropriate doses at bedtime for children may help them sleep better. If pregnant take 1 -500 mg (Tylenol) every 8 hours as needed. Ibuprofen/Aleve/aspirin for pain and fevers SHOULD NOT BE USED IN THE SETTING OF POSSIBLE COVID-19 viral infection NOR if pregnant, if you have acid reflux, high blood pressure, CHF, or kidney problems. 6.Gargle: (DAY ONE OF SYMPTOMS) Gargle in the back of the throat with the head tilted back and to the sides with a strong mouthwash  ( Listerine or Scope) after meals and at bedtime at least 4 -5 times a day. This helps kill bacteria and viruses in the back of the throat and will shorten the duration and decrease the severity of your symptoms: sore throat, cough, ear popping,/ear pain, and possibly dizziness. 7. Smoking: Avoid smoking or exposure to second hand smoke. 8. Zinc: (DAY ONE OF SYMPTOMS)  Zinc lozenges such as Cold Donnie (available most stores), or Basic ([employer] brand) will help shorten the duration and lessen symptoms such as sore throat, cough, nasal congestion, runny nose, and post nasal drip.  Use 1 lozenge every 2-4 hours ( after meals if stomach is sensitive). Children can use 10-15 mg or less 3-4 times a day or Zinc lollypops. In pregnancy limit to 50-60 mg a day for 7 days as prenatals have Zinc also. With diarrhea use zinc pills 50 mg 1/2 to 1 pill 2x/day. 9. Vitamins: Vitamin C 500 mg with breakfast and dinner (with suspected or diagnosed COVID-19 infection take vitamin C 1000 mg 2-3 times a day). Children and pregnant women should drink citrus juices. This speeds healing and strengthens immune system. 10. Chest Symptoms: Vicks Vapor rub to the chest at bedtime. 11. Decongestants: Avoid all decongestants and antihistamine cold preparations in children. Decongestants should always be avoided in people with high blood pressure, palpitations, heart disease and those on stimulant medications. Antihistamines may impede the body's ability to fight COVID-19.  12. Frequent hand washing and/or hand gel especially after coughing or sneezing into the hands or blowing the nose to help prevent spreading to others. Use kleenex and NOT handkerchiefs. Try all of the above starting with day 1 of symptoms. If Strep throat symptoms appear call to be seen in the office as soon as possible and don't gargle on that day. Newborns, infants, or anyone with earaches or influenza may need to be seen quickly. Adults with fevers over 103 degrees or shortness of breath should call the office immediately.     Nutrients that support the immune system:  Beta carotene/vitamin A  Vitamin C  Vitamin D  Zinc  Proteins  Probiotics/prebiotic's(prebiotics are fiber sources that support the growth of probiotics)  Water from all sources including foods such as fruit: Women 2.7 L / 91 ounces per day AND men 3.7 L/125 ounces per day  Source Reji ROBERTS (award winning nutritionist/registered dietitian, author, ) 3/25/2020

## 2020-12-03 NOTE — PROGRESS NOTES
Sophia, adenomatous polyps    COLONOSCOPY  06/25/2009    Dr. April Millan  12/30/2015    Dr. Geovany Levine, recheck 2020    LUMBAR LAMINECTOMY  2008    L1-L2    NASAL SEPTUM SURGERY  1976    NERVE BIOPSY  2011    Spine:  myxopapillary ependymoma     SKIN CANCER EXCISION  12/08/2004    SCC chest wall    TYMPANOSTOMY TUBE PLACEMENT Bilateral 2017    UPPER GASTROINTESTINAL ENDOSCOPY  09/13/2012    Dr. Joseph Guess History     Socioeconomic History    Marital status:      Spouse name: Not on file    Number of children: 4    Years of education: Not on file    Highest education level: Not on file   Occupational History    Occupation: Surgical asst     Employer: [employer]     Comment: 35   Social Needs    Financial resource strain: Not on file    Food insecurity     Worry: Not on file     Inability: Not on file   [employer] needs     Medical: Not on file     Non-medical: Not on file   Tobacco Use    Smoking status: Former Smoker     Packs/day: 1.00     Years: 22.00     Pack years: 22.00     Types: Cigarettes     Start date: 6/1/1972     Last attempt to quit: 2005     Years since quitting: 15.9    Smokeless tobacco: Never Used   Substance and Sexual Activity    Alcohol use: Yes     Alcohol/week: 3.0 standard drinks     Types: 3 Shots of liquor per week     Comment: OCCASIONAL.      Drug use: Not on file    Sexual activity: Not on file   Lifestyle    Physical activity     Days per week: Not on file     Minutes per session: Not on file    Stress: Not on file   Relationships    Social connections     Talks on phone: Not on file     Gets together: Not on file     Attends Latter day service: Not on file     Active member of club or organization: Not on file     Attends meetings of clubs or organizations: Not on file     Relationship status: Not on file    Intimate partner violence     Fear of current or ex partner: Not on file     Emotionally abused: Not on file     Physically abused: Not on file     Forced sexual activity: Not on file   Other Topics Concern    Not on file   Social History Narrative    Swim weekly 1 hr, bike 1/2 hr 2x/wk, weights 1x/wk x 45 min. Walking 3 mi 2x/wk. 12/3/20       Family History   Problem Relation Age of Onset    Diabetes Mother     Breast Cancer Mother     Cancer Mother         bladder    Coronary Art Dis Father     Colon Cancer Father     Heart Surgery Brother 54        CABG    Coronary Art Dis Brother     High Cholesterol Brother     No Known Problems Brother        No Known Allergies    Current Outpatient Medications   Medication Sig Dispense Refill    metoprolol tartrate (LOPRESSOR) 50 MG tablet Take 50 mg by mouth 2 TABLETS IN THE AM AND 2 TABLETS IN THE PM      zolpidem (AMBIEN) 10 MG tablet TAKE 1/2 TO 1 TABLET BY MOUTH NIGHTLY AS NEEDED FOR SLEEP 30 tablet 0    atorvastatin (LIPITOR) 40 MG tablet TAKE 1 TABLET BY MOUTH ONE TIME A DAY 90 tablet 0    triamcinolone (KENALOG) 0.1 % cream Apply topically 2 times daily Apply topically 2 times daily. 80 g 2    clobetasol (TEMOVATE) 0.05 % cream Apply topically 2 times daily Apply topically 2 times daily. 120 g 2    sildenafil (REVATIO) 20 MG tablet Take 1-5 tablets by mouth as needed  3    thyroid (ARMOUR) 130 MG tablet Take 130 mg by mouth daily      tamsulosin (FLOMAX) 0.4 MG capsule Take 0.4 mg by mouth daily      Testosterone 20 % CREA by Does not apply route. Kali Kinney ibuprofen (ADVIL;MOTRIN) 200 MG tablet Take 200 mg by mouth 2 times daily      Cholecalciferol (VITAMIN D3) 5000 units TABS Take 1 tablet by mouth daily      Nutritional Supplements (DHEA PO) Take 35 mg by mouth daily      clomiPRAMINE (ANAFRANIL) 25 MG capsule Take 3 capsules by mouth nightly 270 capsule 3     No current facility-administered medications for this visit. Objective:   Physical Exam  Vitals signs and nursing note reviewed. Constitutional:       General: He is not in acute distress. Appearance: He is well-developed. He is not diaphoretic. Eyes:      General: No scleral icterus. Right eye: No discharge. Left eye: No discharge. Conjunctiva/sclera: Conjunctivae normal.   Neck:      Musculoskeletal: Neck supple. Thyroid: No thyroid mass or thyromegaly. Vascular: No carotid bruit or JVD. Trachea: Trachea and phonation normal. No tracheal tenderness or tracheal deviation. Cardiovascular:      Rate and Rhythm: Normal rate and regular rhythm. Heart sounds: Normal heart sounds, S1 normal and S2 normal. Heart sounds not distant. No murmur. No friction rub. No gallop. No S3 or S4 sounds. Pulmonary:      Effort: Pulmonary effort is normal. No respiratory distress. Breath sounds: Normal breath sounds. No stridor. No decreased breath sounds, wheezing, rhonchi or rales. Lymphadenopathy:      Head:      Right side of head: No submandibular or tonsillar adenopathy. Left side of head: No submandibular or tonsillar adenopathy. Cervical: No cervical adenopathy. Right cervical: No superficial, deep or posterior cervical adenopathy. Left cervical: No superficial, deep or posterior cervical adenopathy. Skin:     General: Skin is warm and dry. Coloration: Skin is not pale. Nails: There is clubbing. Comments: With white nails. Neurological:      Mental Status: He is alert. Deep Tendon Reflexes:      Reflex Scores:       Patellar reflexes are 2+ on the right side and 2+ on the left side.   Psychiatric:         Speech: Speech normal.         Behavior: Behavior normal.         Judgment: Judgment normal.       Vitals:    12/03/20 1602   BP: 126/84   Site: Right Upper Arm   Position: Sitting   Cuff Size: Large Adult   Pulse: 74   Resp: 20   Temp: 97 °F (36.1 °C)   TempSrc: Infrared   SpO2: 95%   Weight: 213 lb (96.6 kg)   Height: 6' 1\" (1.854 m)     BP Readings from Last 3 Encounters:   12/03/20 126/84   03/11/20 (!) 189/106 01/10/20 (!) 140/88     Pulse Readings from Last 3 Encounters:   12/03/20 74   03/11/20 82   01/10/20 85     Wt Readings from Last 3 Encounters:   12/03/20 213 lb (96.6 kg)   03/11/20 218 lb (98.9 kg)   01/10/20 220 lb (99.8 kg)     Body mass index is 28.1 kg/m². Assessment:      1. Essential hypertension    2. Mixed hyperlipidemia    3. Coronary artery disease involving native coronary artery of native heart without angina pectoris    4. Vitamin D deficiency    5. Educated about COVID-19 virus infection          Plan:     65  - Counseling More Than 50% of the 40 min Appointment Time     Zafar Lilly was seen today for hypertension, hyperlipidemia and thyroid problem. Diagnoses and all orders for this visit:    Essential hypertension  -     COMPREHENSIVE METABOLIC PANEL; Future  -     Good control.  -     Continue meds and lifestyle control. Mixed hyperlipidemia  -     Lipid, Fasting; Future  -     COMPREHENSIVE METABOLIC PANEL; Future  -     HOMOCYSTEINE, SERUM; Future  -     Good control.  -     Continue meds and lifestyle control. Coronary artery disease involving native coronary artery of native heart without angina pectoris  -     Lipid, Fasting; Future  -     Good control.  -     Continue meds and lifestyle control. Vitamin D deficiency  -     VITAMIN D 25 HYDROXY; Future  -     Good control.  -     Continue meds and lifestyle control. The risk is likely to increase for COVID-19 infection as the reopening occurs. This is because while some people are being careful with social distancing masks and social isolation other people are totally ignoring it.      Preventing the Spread of Coronavirus Disease 2019 in Homes and Residential Communities   For the most recent information go to [employer]    Prevention steps for People with confirmed or suspected COVID-19 (including persons under investigation) who do not need to be hospitalized  and   People with confirmed COVID-19 who were hospitalized and determined to be medically stable to go home    Your healthcare provider and public health staff will evaluate whether you can be cared for at home. If it is determined that you do not need to be hospitalized and can be isolated at home, you will be monitored by staff from your local or state health department. You should follow the prevention steps below until a healthcare provider or local or state health department says you can return to your normal activities. Stay home except to get medical care  People who are mildly ill with COVID-19 are able to isolate at home during their illness. You should restrict activities outside your home, except for getting medical care. Do not go to work, school, or public areas. Avoid using public transportation, ride-sharing, or taxis. Separate yourself from other people and animals in your home  People: As much as possible, you should stay in a specific room and away from other people in your home. Also, you should use a separate bathroom, if available. Animals: You should restrict contact with pets and other animals while you are sick with COVID-19, just like you would around other people. Although there have not been reports of pets or other animals becoming sick with COVID-19, it is still recommended that people sick with COVID-19 limit contact with animals until more information is known about the virus. When possible, have another member of your household care for your animals while you are sick. If you are sick with COVID-19, avoid contact with your pet, including petting, snuggling, being kissed or licked, and sharing food. If you must care for your pet or be around animals while you are sick, wash your hands before and after you interact with pets and wear a facemask.   Call ahead before visiting your doctor  If you have a medical appointment, call the healthcare provider and tell them that you have or may have COVID-19. This will help the healthcare providers office take steps to keep other people from getting infected or exposed. Wear a facemask  You should wear a facemask when you are around other people (e.g., sharing a room or vehicle) or pets and before you enter a healthcare providers office. If you are not able to wear a facemask (for example, because it causes trouble breathing), then people who live with you should not stay in the same room with you, or they should wear a facemask if they enter your room. Cover your coughs and sneezes  Cover your mouth and nose with a tissue when you cough or sneeze. Throw used tissues in a lined trash can. Immediately wash your hands with soap and water for at least 20 seconds or, if soap and water are not available, clean your hands with an alcohol-based hand  that contains at least 60% alcohol. Clean your hands often  Wash your hands often with soap and water for at least 20 seconds, especially after blowing your nose, coughing, or sneezing; going to the bathroom; and before eating or preparing food. If soap and water are not readily available, use an alcohol-based hand  with at least 60% alcohol, covering all surfaces of your hands and rubbing them together until they feel dry. Soap and water are the best option if hands are visibly dirty. Avoid touching your eyes, nose, and mouth with unwashed hands. Avoid sharing personal household items  You should not share dishes, drinking glasses, cups, eating utensils, towels, or bedding with other people or pets in your home. After using these items, they should be washed thoroughly with soap and water. Clean all high-touch surfaces everyday  High touch surfaces include counters, tabletops, doorknobs, bathroom fixtures, toilets, phones, keyboards, tablets, and bedside tables. Also, clean any surfaces that may have blood, stool, or body fluids on them.  Use a household cleaning spray or wipe, according to the label instructions. Labels contain instructions for safe and effective use of the cleaning product including precautions you should take when applying the product, such as wearing gloves and making sure you have good ventilation during use of the product. Monitor your symptoms  Seek prompt medical attention if your illness is worsening (e.g., difficulty breathing). Before seeking care, call your healthcare provider and tell them that you have, or are being evaluated for, COVID-19. Put on a facemask before you enter the facility. These steps will help the healthcare providers office to keep other people in the office or waiting room from getting infected or exposed. Ask your healthcare provider to call the local or state health department. Persons who are placed under active monitoring or facilitated self-monitoring should follow instructions provided by their local health department or occupational health professionals, as appropriate. When working with your local health department check their available hours. If you have a medical emergency and need to call 911, notify the dispatch personnel that you have, or are being evaluated for COVID-19. If possible, put on a facemask before emergency medical services arrive. Discontinuing home isolation  Patients with confirmed COVID-19 should remain under home isolation precautions until the risk of secondary transmission to others is thought to be low. The decision to discontinue home isolation precautions should be made on a case-by-case basis, in consultation with healthcare providers and state and local health departments. Use Tylenol NOT Ibuprofen/Aleve/aspirin for pain or fevers. There is a theoretical decrease in the body's ability to fight the virus when you are infected if you are on NSAIDs. The FDA has said that this information is not yet proven.      The newest evidence suggest that wearing a mask in public may be beneficial if you remember that the outside of it is contaminated and treat it accordingly. It also helps prevent spread if someone has an asymptomatic or presymptomatic case and does not know it yet. Even cloth masks can be beneficial.    Advance Care Planning  People with COVID-19 may have no symptoms, mild symptoms, such as fever, cough, and shortness of breath or they may have more severe illness, developing severe and fatal pneumonia. As a result, Advance Care Planning with attention to naming a health care decision maker (someone you trust to make healthcare decisions for you if you could not speak for yourself) and sharing other health care preferences is important BEFORE a possible health crisis. Please contact your Primary Care Provider to discuss Advance Care Planning. Vitamin D by mouth and vitamin C intravenously are being used as part of the treatment regimen for COVID-19 in some hospitalized patients. Take vitamin D 5000 IU 5x/week. Also start vitamin C 500 mg daily. Both of these should be continued for the duration of the moderate risk portion of the COVID-19 pandemic expected to be 2 years according to the [employer]. Continue wearing a mask when around people except those living in the same house who are not infected with or heavily exposed to COVID-19, handwashing/hand gel use, social distancing, and social isolation for nonessential activities. IF you develop ANY respiratory infection symptoms (not just allergy symptoms) suggestive of COVID-19 start the recommendations below: Instructions for Respiratory Infections (SAVE THIS SHEET)    For the first 7-14 days of symptoms follow instructions below, even before being seen in the office or even during treatment with antibiotics, until symptom free. 1. Water: Drink 1 ounce of water for every 2 pounds of body weight for adults, You need 96 oz Ounces of water/fluids per day.  This will loosen mucus in the head and chest & improve the weak feeling of dehydration, allow the body to get germ fighting resources to the infection. Half of fluids can be juice or sugar free Crystal Light. Don't count drinks with caffeine, alcohol or carbonation. Infants can have Pedialyte liquid or freezer pops. Avoid salt and sports drinks if you have high Blood Pressure, swelling in the feet or ankles or have heart problems. 2. Humidity: Humidify the air to 35-50% ( or until the windows fog over slightly). Can use a humidifier, vaporizer, boil water on the stove or put a coffee can full of water on the heater vents. This will loosen mucus from infections and allergies. 3. Sleep: Get 8-10 hours a night and rest during the evening after work or school. If you have trouble sleeping, adults can take Melatonin 5mg up to 2 tabs at bedtime ( not for children or pregnant women). If Mono is suspected then sleep during 9PM to 9AM time span (if possible.)  4. Cough: Take cough medicines with Guaifenesin ( to loosen chest or head congestion) and Dextromethorphan ( to decrease excess cough). Robitussin D.M. Syrup every 4-6 hrs OR Mucinex D. M. pills OR Delsym DM syrup twice a day. Use the pediatric formulations for children over 6 months making sure they are alcohol & sugar free for children, pregnant women, and diabetics. 5. Pain And Fevers: Take Acetaminophen ( Tylenol) for fevers, aches, and headaches. 2-500 mg every 8 hours for adults. Appropriate doses at bedtime for children may help them sleep better. If pregnant take 1 -500 mg (Tylenol) every 8 hours as needed. Ibuprofen/Aleve/aspirin for pain and fevers SHOULD NOT BE USED IN THE SETTING OF POSSIBLE COVID-19 viral infection NOR if pregnant, if you have acid reflux, high blood pressure, CHF, or kidney problems.   6.Gargle: (DAY ONE OF SYMPTOMS) Gargle in the back of the throat with the head tilted back and to the sides with a strong mouthwash  ( Listerine or Scope) after meals and at bedtime at least 4 -5 times a day. This helps kill bacteria and viruses in the back of the throat and will shorten the duration and decrease the severity of your symptoms: sore throat, cough, ear popping,/ear pain, and possibly dizziness. 7. Smoking: Avoid smoking or exposure to second hand smoke. 8. Zinc: (DAY ONE OF SYMPTOMS)  Zinc lozenges such as Cold Donnie (available most stores), or Basic ([employer] brand) will help shorten the duration and lessen symptoms such as sore throat, cough, nasal congestion, runny nose, and post nasal drip. Use 1 lozenge every 2-4 hours ( after meals if stomach is sensitive). Children can use 10-15 mg or less 3-4 times a day or Zinc lollypops. In pregnancy limit to 50-60 mg a day for 7 days as prenatals have Zinc also. With diarrhea use zinc pills 50 mg 1/2 to 1 pill 2x/day. 9. Vitamins: Vitamin C 500 mg with breakfast and dinner (with suspected or diagnosed COVID-19 infection take vitamin C 1000 mg 2-3 times a day). Children and pregnant women should drink citrus juices. This speeds healing and strengthens immune system. 10. Chest Symptoms: Vicks Vapor rub to the chest at bedtime. 11. Decongestants: Avoid all decongestants and antihistamine cold preparations in children. Decongestants should always be avoided in people with high blood pressure, palpitations, heart disease and those on stimulant medications. Antihistamines may impede the body's ability to fight COVID-19.  12. Frequent hand washing and/or hand gel especially after coughing or sneezing into the hands or blowing the nose to help prevent spreading to others. Use kleenex and NOT handkerchiefs. Try all of the above starting with day 1 of symptoms. If Strep throat symptoms appear call to be seen in the office as soon as possible and don't gargle on that day. Newborns, infants, or anyone with earaches or influenza may need to be seen quickly.  Adults with fevers over 103 degrees or shortness of breath should call the

## 2020-12-07 RX ORDER — METOPROLOL SUCCINATE 50 MG/1
TABLET, EXTENDED RELEASE ORAL
Qty: 270 TABLET | Refills: 0 | Status: SHIPPED | OUTPATIENT
Start: 2020-12-07 | End: 2021-01-11

## 2020-12-28 ENCOUNTER — PATIENT MESSAGE (OUTPATIENT)
Dept: FAMILY MEDICINE CLINIC | Age: 65
End: 2020-12-28

## 2020-12-28 RX ORDER — AMOXICILLIN AND CLAVULANATE POTASSIUM 875; 125 MG/1; MG/1
1 TABLET, FILM COATED ORAL 2 TIMES DAILY
Qty: 20 TABLET | Refills: 0 | Status: SHIPPED | OUTPATIENT
Start: 2020-12-28 | End: 2021-01-07

## 2020-12-28 NOTE — TELEPHONE ENCOUNTER
From: Kay Morse  To: Shoshana Be DO  Sent: 12/28/2020 6:33 AM EST  Subject: Non-Urgent Medical Question    Since last Thursday I have been having a wet cough with nasal congestion. On Friday evening I started feeling weak and achy. Got a rapid Covid on Saturday which came back negative but also having a low grade fever now. From my past episodes with colds I would always get a sinus infection that was treated with 500mg of Amoxicillin.  Do I need to be seen or can you call a prescription in?

## 2020-12-29 ENCOUNTER — PATIENT MESSAGE (OUTPATIENT)
Dept: FAMILY MEDICINE CLINIC | Age: 65
End: 2020-12-29

## 2021-01-07 DIAGNOSIS — I10 ESSENTIAL HYPERTENSION: ICD-10-CM

## 2021-01-07 DIAGNOSIS — E78.2 MIXED HYPERLIPIDEMIA: ICD-10-CM

## 2021-01-07 DIAGNOSIS — R79.83 HOMOCYSTEINEMIA: Chronic | ICD-10-CM

## 2021-01-07 DIAGNOSIS — E55.9 VITAMIN D DEFICIENCY: ICD-10-CM

## 2021-01-07 DIAGNOSIS — I25.10 CORONARY ARTERY DISEASE INVOLVING NATIVE CORONARY ARTERY OF NATIVE HEART WITHOUT ANGINA PECTORIS: ICD-10-CM

## 2021-01-07 DIAGNOSIS — E78.2 MIXED HYPERLIPIDEMIA: Primary | Chronic | ICD-10-CM

## 2021-01-07 LAB
A/G RATIO: 1.3 (ref 1.1–2.2)
ALBUMIN SERPL-MCNC: 4 G/DL (ref 3.4–5)
ALP BLD-CCNC: 75 U/L (ref 40–129)
ALT SERPL-CCNC: 26 U/L (ref 10–40)
ANION GAP SERPL CALCULATED.3IONS-SCNC: 11 MMOL/L (ref 3–16)
AST SERPL-CCNC: 28 U/L (ref 15–37)
BILIRUB SERPL-MCNC: 0.8 MG/DL (ref 0–1)
BUN BLDV-MCNC: 20 MG/DL (ref 7–20)
CALCIUM SERPL-MCNC: 9.6 MG/DL (ref 8.3–10.6)
CHLORIDE BLD-SCNC: 94 MMOL/L (ref 99–110)
CHOLESTEROL, FASTING: 131 MG/DL (ref 0–199)
CO2: 31 MMOL/L (ref 21–32)
CREAT SERPL-MCNC: 1.3 MG/DL (ref 0.8–1.3)
GFR AFRICAN AMERICAN: >60
GFR NON-AFRICAN AMERICAN: 55
GLOBULIN: 3 G/DL
GLUCOSE BLD-MCNC: 100 MG/DL (ref 70–99)
HDLC SERPL-MCNC: 22 MG/DL (ref 40–60)
HOMOCYSTEINE: 20 UMOL/L (ref 0–10)
LDL CHOLESTEROL CALCULATED: 80 MG/DL
POTASSIUM SERPL-SCNC: 4.2 MMOL/L (ref 3.5–5.1)
SODIUM BLD-SCNC: 136 MMOL/L (ref 136–145)
TOTAL PROTEIN: 7 G/DL (ref 6.4–8.2)
TRIGLYCERIDE, FASTING: 143 MG/DL (ref 0–150)
VITAMIN D 25-HYDROXY: 117.8 NG/ML
VLDLC SERPL CALC-MCNC: 29 MG/DL

## 2021-01-07 RX ORDER — FOLIC ACID 1 MG/1
1 TABLET ORAL DAILY
Qty: 90 TABLET | Refills: 3 | Status: SHIPPED | OUTPATIENT
Start: 2021-01-07 | End: 2022-01-10

## 2021-01-11 ENCOUNTER — OFFICE VISIT (OUTPATIENT)
Dept: CARDIOLOGY CLINIC | Age: 66
End: 2021-01-11
Payer: COMMERCIAL

## 2021-01-11 VITALS
DIASTOLIC BLOOD PRESSURE: 84 MMHG | WEIGHT: 219 LBS | SYSTOLIC BLOOD PRESSURE: 130 MMHG | TEMPERATURE: 96.6 F | HEART RATE: 68 BPM | BODY MASS INDEX: 28.89 KG/M2

## 2021-01-11 DIAGNOSIS — I25.10 CORONARY ARTERY DISEASE INVOLVING NATIVE CORONARY ARTERY OF NATIVE HEART WITHOUT ANGINA PECTORIS: Primary | ICD-10-CM

## 2021-01-11 PROCEDURE — 99214 OFFICE O/P EST MOD 30 MIN: CPT | Performed by: NURSE PRACTITIONER

## 2021-01-11 RX ORDER — CLOMIPRAMINE HYDROCHLORIDE 25 MG/1
25 CAPSULE ORAL NIGHTLY
COMMUNITY
End: 2021-06-29

## 2021-01-11 RX ORDER — HYDROCHLOROTHIAZIDE 25 MG/1
25 TABLET ORAL DAILY
COMMUNITY
End: 2021-01-11 | Stop reason: SDUPTHER

## 2021-01-11 RX ORDER — HYDROCHLOROTHIAZIDE 25 MG/1
25 TABLET ORAL DAILY
Qty: 90 TABLET | Refills: 3 | Status: SHIPPED | OUTPATIENT
Start: 2021-01-11 | End: 2022-01-10

## 2021-01-11 RX ORDER — METOPROLOL TARTRATE 50 MG/1
50 TABLET, FILM COATED ORAL 2 TIMES DAILY
Qty: 60 TABLET | Refills: 3
Start: 2021-01-11 | End: 2021-04-22

## 2021-01-11 NOTE — PROGRESS NOTES
[facility]  Office Visit           Candi Cutler MD,  Amita Dorsey Fionamassimobogdan SAMPSON FNP CVNP       Cardiology             Danielle Andersen  1955 January 11, 2021    Primary Cardiologist:  John Soriano Hx: Mr. Sharron Jamil is here with  mild CAD/ stable   HTN / no premature CAD  In family    Hx RBBB   No  PND/ denies RODRI   Remote smoker / no illicite drugs occas alcohol     Today no c/o c/p / SOB / edema / VSS /wt stable   states had Covid on [place]   Active/ works part time   His b/p was elevated and he doubled his BB but he states he became hypotensive and now back on one tab BlD   complaint with meds   Discussed nutrition / sodium intake / fluid intake   Recommend activity as tolerated       [facility] 5/2019 Coronary anatomy:   The left main coronary artery is normal.   Left anterior descending artery has proximal narrowing 20-25% IFR was 0.96  Circumflex artery is normal and codominant  The right coronary artery has awkward anterior takeoff and  is a co- dominant vessel and normal.   Left ventriculogram shows ejection fraction of 55%. Normal wall motion. Impression:  Mild CAD and no intervention   Plan:  Primary prevention Optimize lipids and BP LDL was 61    Medical management    I have examined pt and reviewed notes / any lab work EKGs stress test, angiograms, & images reviewed  I  have spent >20  minutes of face to face time with the patient with more than 50% spent counseling and coordinating care this patient. Review of Systems:  Constitutional: Denies  fatigue, weakness, night sweats or fever. HEENT: Denies new visual changes, ringing in ears, nosebleeds,nasal congestion  Respiratory: Denies new or change in SOB, PND, orthopnea or cough. Cardiovascular: see HPI  GI: Denies N/V, diarrhea, constipation, abdominal pain, change in bowel habits, melena or hematochezia  : Denies urinary frequency, urgency, incontinence, hematuria or dysuria. Skin: Denies rash, hives, or cyanosis  Musculoskeletal: Denies joint or muscle aches/pain  Neurological: Denies syncope or TIA-like symptoms. Psychiatric: Denies anxiety, insomnia or depression     Past Medical History:   Diagnosis Date    Anxiety     BPH (benign prostatic hyperplasia) 2016    Chronic back pain     Colon polyps 2005    Coronary atherosclerosis 05/10/2019    cath, LAD had 20-25% narrowing but required no intervention other than maximizing medical tx.  [facility]     ED (erectile dysfunction)     History of skin cancer     Homocysteinemia ([facility] [address] 1/7/2021    Homocystine 20 on 1/7/21.  Hyperlipidemia     Hypertension 2008    Hypothyroidism     Insomnia     Osteoarthritis     Right bundle branch block left axis -bifascicular block 03/07/2019    -Right bundle branch block with .     Spinal cord tumor 2011    Testosterone deficiency      Past Surgical History:   Procedure Laterality Date    CARDIAC CATHETERIZATION  05/07/2019    COLONOSCOPY  09/13/2012    Dr. Bhavik Lawson  04/16/2005    Dr. Capri Shah, adenomatous polyps    COLONOSCOPY  06/25/2009    Dr. Bhavik Lawson  12/30/2015    Dr. Capri Shah, recheck 2020    LUMBAR LAMINECTOMY  2008    L1-L2    NASAL SEPTUM SURGERY  1976    NERVE BIOPSY  2011    Spine:  myxopapillary ependymoma     SKIN CANCER EXCISION  12/08/2004    SCC chest wall    TYMPANOSTOMY TUBE PLACEMENT Bilateral 2017    UPPER GASTROINTESTINAL ENDOSCOPY  09/13/2012    Dr. Capri Shah     Family History   Problem Relation Age of Onset    Diabetes Mother     Breast Cancer Mother     Cancer Mother         bladder    Coronary Art Dis Father     Colon Cancer Father     Heart Surgery Brother 54        CABG    Coronary Art Dis Brother     High Cholesterol Brother     No Known Problems Brother      Social History     Tobacco Use    Smoking status: Former Smoker     Packs/day: 1.00     Years: 22.00     Pack years: 22.00 Types: Cigarettes     Start date: 1972     Quit date:      Years since quittin.0    Smokeless tobacco: Never Used   Substance Use Topics    Alcohol use: Yes     Alcohol/week: 3.0 standard drinks     Types: 3 Shots of liquor per week     Comment: OCCASIONAL.  Drug use: Not on file       No Known Allergies  Current Outpatient Medications   Medication Sig Dispense Refill    hydroCHLOROthiazide (HYDRODIURIL) 25 MG tablet Take 1 tablet by mouth daily 90 tablet 3    clomiPRAMINE (ANAFRANIL) 25 MG capsule Take 25 mg by mouth nightly 3 tabs nightly      folic acid (FOLVITE) 1 MG tablet Take 1 tablet by mouth daily 90 tablet 3    zolpidem (AMBIEN) 10 MG tablet TAKE 1/2 TO 1 TABLET BY MOUTH NIGHTLY AS NEEDED FOR SLEEP 30 tablet 0    metoprolol tartrate (LOPRESSOR) 50 MG tablet Take 50 mg by mouth 2 TABLETS IN THE AM AND 2 TABLETS IN THE PM      atorvastatin (LIPITOR) 40 MG tablet TAKE 1 TABLET BY MOUTH ONE TIME A DAY 90 tablet 0    triamcinolone (KENALOG) 0.1 % cream Apply topically 2 times daily Apply topically 2 times daily. 80 g 2    clobetasol (TEMOVATE) 0.05 % cream Apply topically 2 times daily Apply topically 2 times daily. 120 g 2    sildenafil (REVATIO) 20 MG tablet Take 1-5 tablets by mouth as needed  3    thyroid (ARMOUR) 130 MG tablet Take 130 mg by mouth daily      tamsulosin (FLOMAX) 0.4 MG capsule Take 0.4 mg by mouth daily      Testosterone 20 % CREA by Does not apply route. Iliana Ao ibuprofen (ADVIL;MOTRIN) 200 MG tablet Take 200 mg by mouth 2 times daily      Cholecalciferol (VITAMIN D3) 5000 units TABS Take 1 tablet by mouth daily      Nutritional Supplements (DHEA PO) Take 35 mg by mouth daily      clomiPRAMINE (ANAFRANIL) 25 MG capsule Take 3 capsules by mouth nightly 270 capsule 3     No current facility-administered medications for this visit.         Physical Exam:   /84   Pulse 68   Temp 96.6 °F (35.9 °C)   Wt 219 lb (99.3 kg)   BMI 28.89 kg/m² BP Readings from Last 3 Encounters:   01/11/21 130/84   12/03/20 126/84   03/11/20 (!) 189/106     Pulse Readings from Last 3 Encounters:   01/11/21 68   12/03/20 74   03/11/20 82     Wt Readings from Last 3 Encounters:   01/11/21 219 lb (99.3 kg)   12/03/20 213 lb (96.6 kg)   03/11/20 218 lb (98.9 kg)     Constitutional: He is oriented to person, place, and time. He appears well-developed and well-nourished. In no acute distress. HEENT: Normocephalic and atraumatic. Sclerae anicteric. No xanthelasmas. Conjunctiva white, no subconjunctival hemorrhage   External inspection of ears nose teeth & gums   Eyes:PERRLA EOM's intact. Neck: Neck supple. No JVD present. Carotids without bruits. No mass and no thyromegaly present. No lymphadenopathy present. Cardiovascular: RRR, normal S1 and S2; no murmur/gallop or rub, PMI nondisplaced  Pulmonary/Chest: Effort normal.  Lungs clear to auscultation. Chest wall nontender  Abdominal: soft, nontender, nondistended. + bowel sounds; no organomegaly or bruits. Aorta normal  Extremities: No edema, cyanosis, or clubbing. Pulses are 2+ radial/carotid/dorsalis pedis bilaterally. Cap refill brisk. Neurological: No cranial nerve deficit. Psychiatric: He has a normal mood and affect.  His speech is normal and behavior is normal.     Lab Review:   Lab Results   Component Value Date    TRIG 94 01/25/2019    HDL 22 01/07/2021    LDLCALC 80 01/07/2021    LABVLDL 29 01/07/2021     Lab Results   Component Value Date     01/07/2021    K 4.2 01/07/2021    CL 94 01/07/2021    CO2 31 01/07/2021    BUN 20 01/07/2021    CREATININE 1.3 01/07/2021    GLUCOSE 100 01/07/2021    CALCIUM 9.6 01/07/2021      Lab Results   Component Value Date    WBC 5.1 05/06/2019    HGB 15.5 05/06/2019    HCT 46.4 05/06/2019    MCV 79.7 (L) 05/06/2019     05/06/2019       I have reviewed any available labs, images, any stress test, LHC on this pt       Assessment:    mild CAD/ stable [facility] 5/2019 Coronary anatomy:   The left main coronary artery is normal.   Left anterior descending artery has proximal narrowing 20-25% IFR was 0.96  Circumflex artery is normal and codominant  The right coronary artery has awkward anterior takeoff and  is a co- dominant vessel and normal.   Left ventriculogram shows ejection fraction of 55%. Normal wall motion.   Impression:  Mild CAD and no intervention   Plan:  Primary prevention Optimize lipids and BP LDL was 61    Medical management    HTN  His b/p was elevated and he doubled his BB but he states he became hypotensive and now back on one tab BlD     Hx RBBB   No  PND/ denies RODRI     Plan:  Fu in approx a year  PCP does his blood work    Grace Canavan APRN,CVNP

## 2021-01-12 DIAGNOSIS — G47.00 INSOMNIA, UNSPECIFIED TYPE: Chronic | ICD-10-CM

## 2021-01-13 DIAGNOSIS — G47.00 INSOMNIA, UNSPECIFIED TYPE: Chronic | ICD-10-CM

## 2021-01-13 RX ORDER — ZOLPIDEM TARTRATE 10 MG/1
TABLET ORAL
Qty: 30 TABLET | Refills: 0 | Status: SHIPPED | OUTPATIENT
Start: 2021-01-16 | End: 2021-03-03

## 2021-02-15 RX ORDER — ATORVASTATIN CALCIUM 40 MG/1
TABLET, FILM COATED ORAL
Qty: 90 TABLET | Refills: 2 | Status: SHIPPED | OUTPATIENT
Start: 2021-02-15 | End: 2021-11-15

## 2021-03-03 DIAGNOSIS — G47.00 INSOMNIA, UNSPECIFIED TYPE: Chronic | ICD-10-CM

## 2021-04-15 RX ORDER — METOPROLOL SUCCINATE 50 MG/1
TABLET, EXTENDED RELEASE ORAL
Qty: 270 TABLET | Refills: 0 | OUTPATIENT
Start: 2021-04-15

## 2021-04-19 RX ORDER — METOPROLOL SUCCINATE 50 MG/1
TABLET, EXTENDED RELEASE ORAL
Qty: 270 TABLET | Refills: 0 | OUTPATIENT
Start: 2021-04-19

## 2021-04-20 ENCOUNTER — TELEPHONE (OUTPATIENT)
Dept: ADMINISTRATIVE | Age: 66
End: 2021-04-20

## 2021-04-20 NOTE — TELEPHONE ENCOUNTER
Received a DETERMINATION for Metoprolol Succinate ER 50MG er tablets  Per plan: Prior Authorization is not required for this medication dosage form and strength at the quantity and days supply requested. This medication does not require a prior authorization because it is a covered benefit. Please have the pharmacy contact [address] Customer Service at [phone] if billing assistance is required. Letter attached. Please notify patient, thank you.

## 2021-04-22 RX ORDER — METOPROLOL TARTRATE 50 MG/1
50 TABLET, FILM COATED ORAL 2 TIMES DAILY
Qty: 60 TABLET | Refills: 3 | Status: SHIPPED | OUTPATIENT
Start: 2021-04-22 | End: 2021-04-22 | Stop reason: SDUPTHER

## 2021-04-22 RX ORDER — METOPROLOL TARTRATE 50 MG/1
50 TABLET, FILM COATED ORAL 2 TIMES DAILY
Qty: 180 TABLET | Refills: 3 | Status: CANCELLED | OUTPATIENT
Start: 2021-04-22

## 2021-04-22 RX ORDER — METOPROLOL TARTRATE 50 MG/1
50 TABLET, FILM COATED ORAL 2 TIMES DAILY
Qty: 60 TABLET | Refills: 1 | Status: SHIPPED | OUTPATIENT
Start: 2021-04-22 | End: 2021-05-12 | Stop reason: SDUPTHER

## 2021-05-11 ENCOUNTER — TELEPHONE (OUTPATIENT)
Dept: FAMILY MEDICINE CLINIC | Age: 66
End: 2021-05-11

## 2021-05-11 DIAGNOSIS — I49.3 FREQUENT PVCS: Chronic | ICD-10-CM

## 2021-05-11 DIAGNOSIS — I10 ESSENTIAL HYPERTENSION: Primary | Chronic | ICD-10-CM

## 2021-05-11 DIAGNOSIS — I25.10 CORONARY ARTERY DISEASE INVOLVING NATIVE CORONARY ARTERY OF NATIVE HEART WITHOUT ANGINA PECTORIS: Chronic | ICD-10-CM

## 2021-05-11 DIAGNOSIS — I45.10 RIGHT BUNDLE BRANCH BLOCK: Chronic | ICD-10-CM

## 2021-05-11 NOTE — TELEPHONE ENCOUNTER
Left message need to hear back from him before we can refill his medication. [facility] was not open at this time.

## 2021-05-12 PROBLEM — I49.3 FREQUENT PVCS: Chronic | Status: ACTIVE | Noted: 2019-03-07

## 2021-05-12 RX ORDER — METOPROLOL TARTRATE 50 MG/1
50 TABLET, FILM COATED ORAL 3 TIMES DAILY
Qty: 90 TABLET | Refills: 0 | Status: SHIPPED | OUTPATIENT
Start: 2021-05-12 | End: 2021-06-22

## 2021-05-13 NOTE — TELEPHONE ENCOUNTER
Tried to call on all the numbers of patient and his spouse and unable to reach either. Not sure why his metoprolol was marked 2 pills twice daily. Patient called back. He is taking 1 in the morning and 2 in the evening. He does not get orthostatic hypotension when he takes the medication that way. He checks his blood pressure occasionally. Explained that he could take it 3 times a day: Breakfast, dinner and bedtime and spread it out a little bit more. We can adjust dose when he is in. He was on the long-acting drug before.

## 2021-06-14 ENCOUNTER — OFFICE VISIT (OUTPATIENT)
Dept: FAMILY MEDICINE CLINIC | Age: 66
End: 2021-06-14
Payer: COMMERCIAL

## 2021-06-14 VITALS
HEART RATE: 62 BPM | WEIGHT: 222 LBS | SYSTOLIC BLOOD PRESSURE: 128 MMHG | BODY MASS INDEX: 29.42 KG/M2 | DIASTOLIC BLOOD PRESSURE: 84 MMHG | RESPIRATION RATE: 16 BRPM | OXYGEN SATURATION: 97 % | HEIGHT: 73 IN

## 2021-06-14 DIAGNOSIS — E55.9 VITAMIN D DEFICIENCY: ICD-10-CM

## 2021-06-14 DIAGNOSIS — M33.90 DERMATOMYOSITIS (HCC): ICD-10-CM

## 2021-06-14 DIAGNOSIS — I25.10 CORONARY ARTERY DISEASE INVOLVING NATIVE CORONARY ARTERY OF NATIVE HEART WITHOUT ANGINA PECTORIS: ICD-10-CM

## 2021-06-14 DIAGNOSIS — R79.83 HOMOCYSTEINEMIA: ICD-10-CM

## 2021-06-14 DIAGNOSIS — E78.2 MIXED HYPERLIPIDEMIA: ICD-10-CM

## 2021-06-14 DIAGNOSIS — I10 ESSENTIAL HYPERTENSION: Primary | ICD-10-CM

## 2021-06-14 PROCEDURE — 99213 OFFICE O/P EST LOW 20 MIN: CPT | Performed by: FAMILY MEDICINE

## 2021-06-14 RX ORDER — CLOBETASOL PROPIONATE 0.5 MG/G
CREAM TOPICAL 2 TIMES DAILY
Qty: 120 G | Refills: 2 | Status: SHIPPED | OUTPATIENT
Start: 2021-06-14

## 2021-06-14 ASSESSMENT — PATIENT HEALTH QUESTIONNAIRE - PHQ9
2. FEELING DOWN, DEPRESSED OR HOPELESS: 0
SUM OF ALL RESPONSES TO PHQ9 QUESTIONS 1 & 2: 0
SUM OF ALL RESPONSES TO PHQ QUESTIONS 1-9: 0
1. LITTLE INTEREST OR PLEASURE IN DOING THINGS: 0
SUM OF ALL RESPONSES TO PHQ QUESTIONS 1-9: 0
SUM OF ALL RESPONSES TO PHQ QUESTIONS 1-9: 0

## 2021-06-14 ASSESSMENT — ENCOUNTER SYMPTOMS
CHEST TIGHTNESS: 0
COUGH: 0
CHOKING: 0
WHEEZING: 0
SHORTNESS OF BREATH: 0

## 2021-06-14 NOTE — PROGRESS NOTES
Avelino Foy (:  1955) is a 78 y.o. male,Established patient, here for evaluation of the following chief complaint(s):  Hypertension (FOLLOW UP ON HTN)       ASSESSMENT/PLAN:  12    Patient Instructions   Marta Jackson was seen today for hypertension. Diagnoses and all orders for this visit:    Essential hypertension  -     Good control.  -     Continue meds and lifestyle control. Mixed hyperlipidemia  -     Good control.  -     Continue meds and lifestyle control. Low HDL  The good HDL cholesterol is not on goal.  You can increase the HDL through exercise most effectively. The recommendation from the [facility] Service for exercise is 30 minutes brisk walking or the equivalent 5 times per week OR aerobic levels of exercise 25 minutes 3 times a week (breathing slightly hard and sweating at room temperature are indicators of aerobic exercise). OR walking 10,000 steps/day counted on a pedometer or cell phone or Fitbit type watch. However the newest recommendation encourages us to be active any way we can by making good choices such as taking the stairs instead of the elevator/escalator, parking at the end of the parking lots stores and walking the distance in and back out, looking for ways to be active with our families like going for a walk with our children or grandchildren, riding bikes with our family and friends, and instead of sitting on the couch and talking on the phone to friends or family drive to the mall and walk together while talking or talking together on the cell phone while we walk on treadmills or ride exercise bikes at home this can help encourage us to stick with a program to have accountability, or riding an exercise bike or walking on a treadmill or using an elliptical while watching our favorite television programs or movies for any period of time. For some people even 5 or 10 minutes 2 or 3 times a day would be extremely helpful.   Omega 3 fatty acids such as are found in fish oil also raise the HDL. Each capsule of fish oil should have 800 mg or more of a combination of EPA & DHA - the active omega 3's- per capsule and you take between 1 caps 2x per day until rechecked. Omega 3's also lower triglycerides and help lubricate joints to lower arthritis pain. Homocysteinemia (HCC)  Folic acid 1 mg bid. Dermatomyositis (HCC)  -     clobetasol (TEMOVATE) 0.05 % cream; Apply topically 2 times daily Apply topically 2 times daily. Vitamin D deficiency  Take 5000 IU every other day. Coronary artery disease involving native coronary artery of native heart without angina pectoris  -     Good control.  -     Continue meds and lifestyle control. Return in about 6 months (around 12/14/2021) for Hypertension, Cholesterol, Physical.     Subjective   SUBJECTIVE/OBJECTIVE:  Chief Complaint   Patient presents with    Hypertension     FOLLOW UP ON HTN   345  HPI    Essential hypertension  Is checking at home. Average 135-140/90. Watches salt. If stands quick get lightheaded. Mixed hyperlipidemia  Is watching diet. Eats more fruits and veggies. Not many fast foods. Not much fried foods. Wife watches diet. No SE's on med. Low HDL  Biking 2-3x/wk for 8 mi. [employer] weights weekly for 1 hr. Then walking when able. Not on fish oil. Homocysteinemia ([address]  On folic acid 1 mg qd. Dermatomyositis ([address]  Stable. Cream helps. Problem is mostly with hands. Does not wear gloves. Seen by rheumatologist and only cream recommended. Vitamin D deficiency  Vitamin D 5000 qod. Coronary artery disease involving native coronary artery of native heart without angina pectoris  No symptoms. Review of Systems   Respiratory: Negative for cough, choking, chest tightness, shortness of breath and wheezing. Cardiovascular: Negative for chest pain, palpitations and leg swelling. Neurological: Positive for light-headedness. Negative for dizziness and headaches.         Past Medical History:   Diagnosis Date    Anxiety     BPH (benign prostatic hyperplasia) 2016    Chronic back pain     Colon polyps 2005    Coronary atherosclerosis 05/10/2019    cath, LAD had 20-25% narrowing but required no intervention other than maximizing medical tx.  Dermatomyositis ([facility] [address]     ED (erectile dysfunction)     History of skin cancer     Homocysteinemia ([facility] [address] 2021    Homocystine 20 on 21.  Hyperlipidemia     Hypertension     Hypothyroidism     Insomnia     Osteoarthritis     Right bundle branch block left axis -bifascicular block 2019    -Right bundle branch block with .     Spinal cord tumor     Testosterone deficiency        Past Surgical History:   Procedure Laterality Date    CARDIAC CATHETERIZATION  2019    COLONOSCOPY  2012    Dr. Carolina Vigil  2005    Dr. Rosalinda Man, adenomatous polyps    COLONOSCOPY  2009    Dr. Carolina Vigil  2015    Dr. Rosalinda Man, recheck     LUMBAR LAMINECTOMY  2008    L1-L2    [address] [facility]    NERVE BIOPSY      Spine:  myxopapillary ependymoma     PROSTATE SURGERY N/A 2021    UROLIFT    SKIN CANCER EXCISION  2004    SCC chest wall    TYMPANOSTOMY TUBE PLACEMENT Bilateral 2017    UPPER GASTROINTESTINAL ENDOSCOPY  2012    Dr. Mattson [place] History     Socioeconomic History    Marital status:      Spouse name: Not on file    Number of children: 4    Years of education: Not on file    Highest education level: Not on file   Occupational History    Occupation: Surgical asst     Employer: [employer]     Comment: 32 hr//wk   Tobacco Use    Smoking status: Former Smoker     Packs/day: 1.00     Years: 22.00     Pack years: 22.00     Types: Cigarettes     Start date: 1972     Quit date:      Years since quittin.4    Smokeless tobacco: Never Used   Vaping Use    Vaping Use: Never used   Substance and Sexual Activity    Alcohol use: Yes     Alcohol/week: 3.0 standard drinks     Types: 3 Shots of liquor per week     Comment: OCCASIONAL.  Drug use: Not on file    Sexual activity: Yes     Partners: Female   Other Topics Concern    Not on file   Social History Narrative    Swim weekly 1 hr, bike 1/2 hr 2x/wk, weights 1x/wk x 45 min. Walking 3 mi 2x/wk. 12/3/20. Biking 2-3x/wk for 8 mi. [employer] weights weekly for 1 hr. Then walking when able. 6/14/21. Social Determinants of Health     Financial Resource Strain:     Difficulty of Paying Living Expenses:    Food Insecurity:     Worried About Running Out of Food in the Last Year:     [address] in the Last Year:    Transportation Needs:     Lack of Transportation (Medical):  Lack of Transportation (Non-Medical):    Physical Activity:     Days of Exercise per Week:     Minutes of Exercise per Session:    Stress:     Feeling of Stress :    Social Connections:     Frequency of Communication with Friends and Family:     Frequency of Social Gatherings with Friends and Family:     Attends Denominational Services:     Active Member of Clubs or Organizations:     Attends Club or Organization Meetings:     Marital Status:    Intimate Partner Violence:     Fear of Current or Ex-Partner:     Emotionally Abused:     Physically Abused:     Sexually Abused:        Family History   Problem Relation Age of Onset    Diabetes Mother     Breast Cancer Mother     Cancer Mother         bladder    Coronary Art Dis Father     Colon Cancer Father     Heart Surgery Brother 54        CABG    Coronary Art Dis Brother     High Cholesterol Brother     No Known Problems Brother        No Known Allergies    Current Outpatient Medications   Medication Sig Dispense Refill    clobetasol (TEMOVATE) 0.05 % cream Apply topically 2 times daily Apply topically 2 times daily.  120 g 2    atorvastatin (LIPITOR) 40 MG tablet TAKE 1 TABLET BY MOUTH ONE TIME A DAY 90 tablet 2    hydroCHLOROthiazide (HYDRODIURIL) 25 MG tablet Take 1 tablet by mouth daily 90 tablet 3    clomiPRAMINE (ANAFRANIL) 25 MG capsule Take 25 mg by mouth nightly 3 tabs nightly      folic acid (FOLVITE) 1 MG tablet Take 1 tablet by mouth daily 90 tablet 3    triamcinolone (KENALOG) 0.1 % cream Apply topically 2 times daily Apply topically 2 times daily. 80 g 2    sildenafil (REVATIO) 20 MG tablet Take 1-5 tablets by mouth as needed  3    thyroid (ARMOUR) 130 MG tablet Take 130 mg by mouth daily      Testosterone 20 % CREA by Does not apply route. Estil Peg ibuprofen (ADVIL;MOTRIN) 200 MG tablet Take 200 mg by mouth 2 times daily      Cholecalciferol (VITAMIN D3) 5000 units TABS Take 1 tablet by mouth daily      Nutritional Supplements (DHEA PO) Take 35 mg by mouth daily      metoprolol tartrate (LOPRESSOR) 50 MG tablet TAKE 1 TABLET BY MOUTH 3 TIMES A DAY 90 tablet 5    tamsulosin (FLOMAX) 0.4 MG capsule Take 0.4 mg by mouth daily (Patient not taking: Reported on 6/14/2021)       No current facility-administered medications for this visit. Objective   Physical Exam  Vitals and nursing note reviewed. Constitutional:       General: He is not in acute distress. Appearance: He is well-developed. He is not diaphoretic. Eyes:      General: No scleral icterus. Right eye: No discharge. Left eye: No discharge. Conjunctiva/sclera: Conjunctivae normal.   Neck:      Thyroid: No thyroid mass or thyromegaly. Vascular: No carotid bruit or JVD. Trachea: Trachea and phonation normal. No tracheal tenderness or tracheal deviation. Cardiovascular:      Rate and Rhythm: Normal rate and regular rhythm. Heart sounds: Normal heart sounds, S1 normal and S2 normal. Heart sounds not distant. No murmur heard. No friction rub. No gallop. No S3 or S4 sounds. Pulmonary:      Effort: Pulmonary effort is normal. No respiratory distress. Breath sounds: Normal breath sounds. No stridor.  No decreased breath sounds, wheezing, rhonchi or rales. Musculoskeletal:      Cervical back: Neck supple. Lymphadenopathy:      Head:      Right side of head: No submandibular or tonsillar adenopathy. Left side of head: No submandibular or tonsillar adenopathy. Cervical: No cervical adenopathy. Right cervical: No superficial, deep or posterior cervical adenopathy. Left cervical: No superficial, deep or posterior cervical adenopathy. Skin:     General: Skin is warm and dry. Coloration: Skin is not pale. Nails: There is clubbing. Comments: Rash on dorsum of all finger. Neurological:      Mental Status: He is alert. Deep Tendon Reflexes:      Reflex Scores:       Patellar reflexes are 2+ on the right side and 2+ on the left side. Psychiatric:         Attention and Perception: Attention and perception normal.         Mood and Affect: Mood and affect normal.         Speech: Speech normal.         Behavior: Behavior normal. Behavior is cooperative. Cognition and Memory: Cognition and memory normal.         Judgment: Judgment normal.          Vitals:    06/14/21 1500   BP: 128/84   Site: Right Upper Arm   Position: Sitting   Cuff Size: Medium Adult   Pulse: 62   Resp: 16   SpO2: 97%   Weight: 222 lb (100.7 kg)   Height: 6' 1\" (1.854 m)     BP Readings from Last 3 Encounters:   06/14/21 128/84   01/11/21 130/84   12/03/20 126/84     Pulse Readings from Last 3 Encounters:   06/14/21 62   01/11/21 68   12/03/20 74     Wt Readings from Last 3 Encounters:   06/14/21 222 lb (100.7 kg)   01/11/21 219 lb (99.3 kg)   12/03/20 213 lb (96.6 kg)     Body mass index is 29.29 kg/m².       1/7/2021 11:44   Sodium 136   Potassium 4.2   Chloride 94 (L)   CO2 31   BUN 20   Creatinine 1.3   Anion Gap 11   GFR Non-African American 55 (A)   Glucose 100 (H)   Calcium 9.6   Total Protein 7.0   Cholesterol, Fasting 131   HDL Cholesterol 22 (L)   LDL Calculated 80   Triglyceride, Fasting 143 VLDL Cholesterol Calculated 29   Homocysteine 20 (H)   Albumin 4.0   Globulin 3.0   Albumin/Globulin Ratio 1.3   Alk Phos 75   ALT 26   AST 28   Bilirubin 0.8   Vit D, 25-Hydroxy 117.8     On this date 6/14/2021 I have spent 27 minutes reviewing previous notes, test results and face to face with the patient discussing the diagnosis and importance of compliance with the treatment plan as well as documenting on the day of the visit. An electronic signature was used to authenticate this note.     --Vicki Wilkins, DO

## 2021-06-14 NOTE — PATIENT INSTRUCTIONS
José Delatorre was seen today for hypertension. Diagnoses and all orders for this visit:    Essential hypertension  -     Good control.  -     Continue meds and lifestyle control. Mixed hyperlipidemia  -     Good control.  -     Continue meds and lifestyle control. Low HDL  The good HDL cholesterol is not on goal.  You can increase the HDL through exercise most effectively. The recommendation from the [facility] Service for exercise is 30 minutes brisk walking or the equivalent 5 times per week OR aerobic levels of exercise 25 minutes 3 times a week (breathing slightly hard and sweating at room temperature are indicators of aerobic exercise). OR walking 10,000 steps/day counted on a pedometer or cell phone or Fitbit type watch. However the newest recommendation encourages us to be active any way we can by making good choices such as taking the stairs instead of the elevator/escalator, parking at the end of the parking lots stores and walking the distance in and back out, looking for ways to be active with our families like going for a walk with our children or grandchildren, riding bikes with our family and friends, and instead of sitting on the couch and talking on the phone to friends or family drive to the mall and walk together while talking or talking together on the cell phone while we walk on treadmills or ride exercise bikes at home this can help encourage us to stick with a program to have accountability, or riding an exercise bike or walking on a treadmill or using an elliptical while watching our favorite television programs or movies for any period of time. For some people even 5 or 10 minutes 2 or 3 times a day would be extremely helpful. Omega 3 fatty acids such as are found in fish oil also raise the HDL.  Each capsule of fish oil should have 800 mg or more of a combination of EPA & DHA - the active omega 3's- per capsule and you take between 1 caps 2x per day until rechecked. Omega 3's also lower triglycerides and help lubricate joints to lower arthritis pain. Homocysteinemia (HCC)  Folic acid 1 mg bid. Dermatomyositis (HCC)  -     clobetasol (TEMOVATE) 0.05 % cream; Apply topically 2 times daily Apply topically 2 times daily. Vitamin D deficiency  Take 5000 IU every other day. Coronary artery disease involving native coronary artery of native heart without angina pectoris  -     Good control.  -     Continue meds and lifestyle control.

## 2021-06-21 DIAGNOSIS — I10 ESSENTIAL HYPERTENSION: Chronic | ICD-10-CM

## 2021-06-21 DIAGNOSIS — I25.10 CORONARY ARTERY DISEASE INVOLVING NATIVE CORONARY ARTERY OF NATIVE HEART WITHOUT ANGINA PECTORIS: Chronic | ICD-10-CM

## 2021-06-22 RX ORDER — METOPROLOL TARTRATE 50 MG/1
TABLET, FILM COATED ORAL
Qty: 90 TABLET | Refills: 5 | Status: SHIPPED | OUTPATIENT
Start: 2021-06-22 | End: 2022-03-17

## 2021-06-29 RX ORDER — CLOMIPRAMINE HYDROCHLORIDE 25 MG/1
CAPSULE ORAL
Qty: 270 CAPSULE | Refills: 1 | Status: SHIPPED | OUTPATIENT
Start: 2021-06-29 | End: 2022-06-07

## 2021-07-12 DIAGNOSIS — G47.00 INSOMNIA, UNSPECIFIED TYPE: Chronic | ICD-10-CM

## 2021-07-13 RX ORDER — ZOLPIDEM TARTRATE 10 MG/1
TABLET ORAL
Qty: 30 TABLET | Refills: 3 | Status: SHIPPED | OUTPATIENT
Start: 2021-07-13 | End: 2021-08-12

## 2021-07-13 NOTE — TELEPHONE ENCOUNTER
LAST OV WITH DR Dianna Mireles 6/14/21    Future Appointments   Date Time Provider Atul Chaves   12/16/2021  2:00 PM DO Piyush Ceja

## 2021-07-13 NOTE — TELEPHONE ENCOUNTER
Insomnia, unspecified type  -     zolpidem (AMBIEN) 10 MG tablet; TAKE 1/2 TO 1 TABLET BY MOUTH NIGHTLY AS NEEDED FOR SLEEP #30 and 3 additional refills. Since he takes half on some nights this should last 6 months. Controlled Substance Monitoring:  Acute and Chronic Pain Monitoring:   RX Monitoring 7/13/2021   Periodic Controlled Substance Monitoring Possible medication side effects, risk of tolerance/dependence & alternative treatments discussed. ;No signs of potential drug abuse or diversion identified. ;Assessed functional status.

## 2021-07-16 ENCOUNTER — OFFICE VISIT (OUTPATIENT)
Dept: ENT CLINIC | Age: 66
End: 2021-07-16
Payer: COMMERCIAL

## 2021-07-16 VITALS — DIASTOLIC BLOOD PRESSURE: 85 MMHG | SYSTOLIC BLOOD PRESSURE: 157 MMHG | HEART RATE: 96 BPM

## 2021-07-16 DIAGNOSIS — H66.001 NON-RECURRENT ACUTE SUPPURATIVE OTITIS MEDIA OF RIGHT EAR WITHOUT SPONTANEOUS RUPTURE OF TYMPANIC MEMBRANE: ICD-10-CM

## 2021-07-16 DIAGNOSIS — H69.83 DYSFUNCTION OF BOTH EUSTACHIAN TUBES: Primary | ICD-10-CM

## 2021-07-16 PROCEDURE — 99213 OFFICE O/P EST LOW 20 MIN: CPT | Performed by: OTOLARYNGOLOGY

## 2021-07-16 RX ORDER — AMOXICILLIN AND CLAVULANATE POTASSIUM 875; 125 MG/1; MG/1
1 TABLET, FILM COATED ORAL 2 TIMES DAILY
Qty: 14 TABLET | Refills: 0 | Status: SHIPPED | OUTPATIENT
Start: 2021-07-16 | End: 2021-07-23

## 2021-07-16 RX ORDER — CIPROFLOXACIN AND DEXAMETHASONE 3; 1 MG/ML; MG/ML
4 SUSPENSION/ DROPS AURICULAR (OTIC) 2 TIMES DAILY
Qty: 1 BOTTLE | Refills: 0 | Status: SHIPPED | OUTPATIENT
Start: 2021-07-16 | End: 2021-07-23

## 2021-07-16 NOTE — PROGRESS NOTES
[address] [facility]  Follow up      Patient Name: Kody Pan  Medical Record Number:  <[id number]>  Primary Care Physician:  Lazarus Gores, DO  Date of Consultation: 7/16/2021    Chief Complaint: Smelly right ear        Interval History  Patient has chronic eustachian tube dysfunction and has had multiple sets of ear tubes. I actually saw him last in March 2020. He has been doing very well since that time. However the last couple of days he has felt as though the right ear is somewhat squishy and smells bad. No pain. No other symptoms. REVIEW OF SYSTEMS  As above    PHYSICAL EXAM  GENERAL: No Acute Distress, Alert and Oriented, no Hoarseness, strong voice  EYES: EOMI, Anti-icteric  HENT:   Head: Normocephalic and atraumatic. Face:  Symmetric, facial nerve intact, no sinus tenderness   ears: See below      PROCEDURE  Bilateral ear exam and debridement  Right ears visualized binoculars scope. There was a significant mount of wax and purulent drainage that evacuated the Li suction. There was some myringitis on the eardrum. The ear tube in the anterior quadrant did not appear to be working, but it was difficult to tell. On the left side the patient had wax. I removed this as well as an old ear tube. It does appear that the tympanic membrane is healed over and I do not appreciate a middle ear effusion        ASSESSMENT/PLAN  1. Dysfunction of both eustachian tubes  Patient does have chronic eustachian tube dysfunction has had multiple sets of ear tubes. He wants to consider doing eustachian tube dilation long-term. He will follow-up for this    2. Non-recurrent acute suppurative otitis media of right ear without spontaneous rupture of tympanic membrane  Patient has otorrhea. Is difficult to tell if it is coming from the middle ear or just from the myringitis noted.   I want to treat him with both Ciprodex drops as well as an oral antibiotic. I have performed a head and neck physical exam personally or was physically present during the key or critical portions of the service. This note was generated completely or in part utilizing Dragon dictation speech recognition software. Occasionally, words are mistranscribed and despite editing, the text may contain inaccuracies due to incorrect word recognition. If further clarification is needed please contact the office at [phone].

## 2021-08-31 ENCOUNTER — OFFICE VISIT (OUTPATIENT)
Dept: ORTHOPEDIC SURGERY | Age: 66
End: 2021-08-31

## 2021-08-31 DIAGNOSIS — M25.562 ACUTE PAIN OF LEFT KNEE: Primary | ICD-10-CM

## 2021-08-31 PROCEDURE — 99999 PR OFFICE/OUTPT VISIT,PROCEDURE ONLY: CPT | Performed by: ORTHOPAEDIC SURGERY

## 2021-08-31 NOTE — PROGRESS NOTES
Fran Morocho is seen today for left knee pain. Began having some discomfort several weeks ago after squatting and twisting. He gets intermittent pain medially with golfing. He has been using some intermittent anti-inflammatory but otherwise has not had much treatment. He has a history of coronary disease, back issues, but is otherwise quite healthy. 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      History: Patient's relevant past family, medical, and social history are reviewed as part of today's visit. ROS of pertinent positives and negatives as above; otherwise negative. General Exam:    Vitals: There were no vitals taken for this visit. Constitutional: Patient is adequately groomed with no evidence of malnutrition  Mental Status: The patient is oriented to time, place and person. The patient's mood and affect are appropriate. Gait:  Patient walks with normal gait and station. Lymphatic: The lymphatic examination bilaterally reveals all areas to be without enlargement or induration. Vascular: Examination reveals no swelling or calf tenderness. Peripheral pulses are palpable and 2+. Neurological: The patient has good coordination. There is no weakness or sensory deficit. Skin:    Head/Neck: inspection reveals no rashes, ulcerations or lesions. Trunk:  inspection reveals no rashes, ulcerations or lesions. Right Lower Extremity: inspection reveals no rashes, ulcerations or lesions. Left Lower Extremity: inspection reveals no rashes, ulcerations or lesions. Right knee has no crepitation or joint line tenderness. He has full motion with no instability. He has no swelling. Left knee has some very trace crepitation. He has some mild medial joint line tenderness but is a bit more discomfort with terminal flexion. He has normal stability calf is soft. X-rays were obtained today in the office and interpreted by me.   AP standing, PA flexed, and merchant views of the bilateral knees as well as a lateral of the left knee. These demonstrate: Patellofemoral chondromalacia but no acute bony abnormalities otherwise. 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 Plan: We will attempt to treat this nonoperatively initially with a cortisone injection. Procedure: Under sterile technique, left knee was injected into the anterolateral arthroscopy portal with 80 mg of Depo-Medrol and 40 mg of lidocaine. He tolerated that well. If he has ongoing issues to let me know in the operating room. All questions have been answered.

## 2021-10-09 RX ORDER — AMOXICILLIN AND CLAVULANATE POTASSIUM 875; 125 MG/1; MG/1
1 TABLET, FILM COATED ORAL 2 TIMES DAILY
Qty: 14 TABLET | Refills: 0 | Status: SHIPPED | OUTPATIENT
Start: 2021-10-09 | End: 2021-10-16

## 2021-11-09 ENCOUNTER — OFFICE VISIT (OUTPATIENT)
Dept: ORTHOPEDIC SURGERY | Age: 66
End: 2021-11-09
Payer: COMMERCIAL

## 2021-11-09 VITALS — HEIGHT: 73 IN | BODY MASS INDEX: 29.42 KG/M2 | WEIGHT: 222 LBS

## 2021-11-09 DIAGNOSIS — M25.561 ACUTE PAIN OF RIGHT KNEE: Primary | ICD-10-CM

## 2021-11-09 PROCEDURE — 20610 DRAIN/INJ JOINT/BURSA W/O US: CPT | Performed by: ORTHOPAEDIC SURGERY

## 2021-11-09 RX ORDER — ZOLPIDEM TARTRATE 10 MG/1
TABLET ORAL
COMMUNITY
Start: 2021-11-01 | End: 2021-12-14

## 2021-11-09 RX ORDER — METHYLPREDNISOLONE ACETATE 40 MG/ML
80 INJECTION, SUSPENSION INTRA-ARTICULAR; INTRALESIONAL; INTRAMUSCULAR; SOFT TISSUE ONCE
Status: COMPLETED | OUTPATIENT
Start: 2021-11-09 | End: 2021-11-09

## 2021-11-09 RX ORDER — LIDOCAINE HYDROCHLORIDE 10 MG/ML
4 INJECTION, SOLUTION INFILTRATION; PERINEURAL ONCE
Status: COMPLETED | OUTPATIENT
Start: 2021-11-09 | End: 2021-11-09

## 2021-11-09 RX ADMIN — METHYLPREDNISOLONE ACETATE 80 MG: 40 INJECTION, SUSPENSION INTRA-ARTICULAR; INTRALESIONAL; INTRAMUSCULAR; SOFT TISSUE at 13:19

## 2021-11-09 RX ADMIN — LIDOCAINE HYDROCHLORIDE 4 ML: 10 INJECTION, SOLUTION INFILTRATION; PERINEURAL at 13:18

## 2021-11-09 NOTE — PROGRESS NOTES
Aayush Null seen today for an injury involving his right knee. Bumped his knee couple of weeks ago and now has significant anterior lateral right knee pain associated with taking a few steps. It similar to the pain he had on the left that was treated with a cortisone injection successfully. General Exam:    Vitals: Height 6' 1\" (1.854 m), weight 222 lb (100.7 kg). Constitutional: Patient is adequately groomed with no evidence of malnutrition  Mental Status: The patient is oriented to time, place and person. The patient's mood and affect are appropriate. Right knee today has mild crepitation and tenderness over the anterior lateral aspect of the knee. Calf is soft. Is full range of motion and no drainable effusion. I reviewed his x-rays from [facility] shows trace narrowing. Assessment: Right knee chondromalacia. Plan: Cortisone injection. Procedure: Or sterile technique, right knee was injected into the anterolateral thrust with poorly milligrams of Depo-Medrol 40 mg lidocaine. Tolerated that well.   Follow-up on an as-needed basis

## 2021-12-12 DIAGNOSIS — F51.04 PSYCHOPHYSIOLOGICAL INSOMNIA: Primary | ICD-10-CM

## 2021-12-14 RX ORDER — ZOLPIDEM TARTRATE 10 MG/1
TABLET ORAL
Qty: 30 TABLET | Refills: 0 | Status: SHIPPED | OUTPATIENT
Start: 2021-12-14 | End: 2022-01-13

## 2022-01-10 RX ORDER — HYDROCHLOROTHIAZIDE 25 MG/1
TABLET ORAL
Qty: 30 TABLET | Refills: 0 | Status: SHIPPED | OUTPATIENT
Start: 2022-01-10 | End: 2022-02-01

## 2022-01-13 ENCOUNTER — OFFICE VISIT (OUTPATIENT)
Dept: FAMILY MEDICINE CLINIC | Age: 67
End: 2022-01-13
Payer: COMMERCIAL

## 2022-01-13 VITALS
SYSTOLIC BLOOD PRESSURE: 122 MMHG | BODY MASS INDEX: 29.18 KG/M2 | OXYGEN SATURATION: 99 % | HEART RATE: 66 BPM | RESPIRATION RATE: 20 BRPM | WEIGHT: 220.2 LBS | DIASTOLIC BLOOD PRESSURE: 74 MMHG | HEIGHT: 73 IN

## 2022-01-13 DIAGNOSIS — E78.2 MIXED HYPERLIPIDEMIA: ICD-10-CM

## 2022-01-13 DIAGNOSIS — I49.3 FREQUENT PVCS: ICD-10-CM

## 2022-01-13 DIAGNOSIS — F51.04 PSYCHOPHYSIOLOGICAL INSOMNIA: ICD-10-CM

## 2022-01-13 DIAGNOSIS — I10 ESSENTIAL HYPERTENSION: ICD-10-CM

## 2022-01-13 DIAGNOSIS — Z00.00 WELL ADULT EXAM: Primary | ICD-10-CM

## 2022-01-13 DIAGNOSIS — Z23 NEED FOR PNEUMOCOCCAL VACCINATION: ICD-10-CM

## 2022-01-13 DIAGNOSIS — M33.90 DERMATOMYOSITIS (HCC): ICD-10-CM

## 2022-01-13 DIAGNOSIS — E55.9 VITAMIN D DEFICIENCY: ICD-10-CM

## 2022-01-13 DIAGNOSIS — I25.10 CORONARY ARTERY DISEASE INVOLVING NATIVE CORONARY ARTERY OF NATIVE HEART WITHOUT ANGINA PECTORIS: ICD-10-CM

## 2022-01-13 DIAGNOSIS — F41.9 ANXIETY: ICD-10-CM

## 2022-01-13 DIAGNOSIS — B35.1 ONYCHOMYCOSIS: ICD-10-CM

## 2022-01-13 PROCEDURE — 99397 PER PM REEVAL EST PAT 65+ YR: CPT | Performed by: FAMILY MEDICINE

## 2022-01-13 PROCEDURE — 90471 IMMUNIZATION ADMIN: CPT | Performed by: FAMILY MEDICINE

## 2022-01-13 PROCEDURE — 90732 PPSV23 VACC 2 YRS+ SUBQ/IM: CPT | Performed by: FAMILY MEDICINE

## 2022-01-13 RX ORDER — TERBINAFINE HYDROCHLORIDE 250 MG/1
250 TABLET ORAL DAILY
Qty: 84 TABLET | Refills: 0 | Status: SHIPPED | OUTPATIENT
Start: 2022-01-13 | End: 2022-04-07

## 2022-01-13 ASSESSMENT — PATIENT HEALTH QUESTIONNAIRE - PHQ9
SUM OF ALL RESPONSES TO PHQ9 QUESTIONS 1 & 2: 0
1. LITTLE INTEREST OR PLEASURE IN DOING THINGS: 0
SUM OF ALL RESPONSES TO PHQ QUESTIONS 1-9: 0
2. FEELING DOWN, DEPRESSED OR HOPELESS: 0

## 2022-01-13 NOTE — PATIENT INSTRUCTIONS
GENERAL OFFICE POLICIES      Telephone Calls: Messages will be answered within 1-2 business days, unless the provider is out of the office. If it is urgent a covering provider will answer. (this does not include Medication refills). MyChart: We recommend all patients sign up for [employer]. Through this portal you can see your lab results, request refills, schedule appointments, pay your bill and send messages to the office. [employer] messages will be answered within 1-2 business days unless the provider is out of the office. For urgent matters, please call the office. Appointments:  All appointments must be scheduled. We ask all patients to schedule their next follow up appointment before they leave the office to make sure you will be able to be seen before you run out of medications. 24 hours notice is required to cancel or reschedule an appointment to avoid being marked as a no show. You may be dismissed from the practice after 3 no shows. LATE for Appointment: If you are 15 or more minutes late for your appointment, you may be asked to reschedule. MA/LAB APPTS: Must be scheduled, cannot accept walk in lab visits. We only draw labs for patients established in our office. We only do injections for medications ordered by our office. Acute Sick Visits:  Nothing other than acute complaint will be addressed at this visit. TRADITIONAL MEDICARE  DOES NOT COVER PHYSICALS  MEDICARE WELLNESS VISITS: These are NOT physicals but the free annual visit offered by Medicare to discuss wellness issues. Medication refills, checkups, etc. will not be addressed during this visit. Medication Refills: Refills are handled electronically so please contact your pharmacy for medication refills even if current refills have been exhausted. If you are on a controlled medication you will be referred to a specialist (pain specialist, psychiatry, etc). Forms:  There is a $35 fee to fill out FMLA/Disability paperwork, payable at time of . Instead of the fee, you can choose to have the paperwork filled out during a separate office visit that is for filling out the paperwork only. Medication Samples: This office does not carry medication samples. If you need assistance in getting your medications, then please let the medical assistant know so they can help you sign up for a drug assistance program that can help get medications at a reduced cost or even free (if you qualify). Workman's Comp Claims: We do not handle workman's comp cases or claims. You will need to go to an urgent care to be seen or to whomever your employer uses. General  Any abusive/rude behavior toward staff/providers may be cause for dismissal.    Otilio Garza was seen today for annual exam.    Diagnoses and all orders for this visit:    Well adult exam  Health Maintenance Due   Topic Date Due    AAA screen  Never done    DTaP/Tdap/Td vaccine (1 - Tdap) Never done    Shingles Vaccine (1 of 2) Never done    Flu vaccine (1) 09/01/2021    Potassium monitoring  01/07/2022    Creatinine monitoring  01/07/2022    Lipid screen  01/07/2022     Mixed hyperlipidemia  -     Lipid Panel; Standing  -     Comprehensive Metabolic Panel; Standing  The good HDL cholesterol is not on goal.  You can increase the HDL through exercise most effectively. The recommendation from the [facility] Service for exercise is 30 minutes brisk walking or the equivalent 5 times per week OR aerobic levels of exercise 25 minutes 3 times a week (breathing slightly hard and sweating at room temperature are indicators of aerobic exercise). OR walking 10,000 steps/day counted on a pedometer or cell phone or Fitbit type watch.  However the newest recommendation encourages us to be active any way we can by making good choices such as taking the stairs instead of the elevator/escalator, parking at the end of the parking lots stores and walking the distance in and back out, looking for ways to be active with our families like going for a walk with our children or grandchildren, riding bikes with our family and friends, and instead of sitting on the couch and talking on the phone to friends or family drive to the mall and walk together while talking or talking together on the cell phone while we walk on treadmills or ride exercise bikes at home this can help encourage us to stick with a program to have accountability, or riding an exercise bike or walking on a treadmill or using an elliptical while watching our favorite television programs or movies for any period of time. For some people even 5 or 10 minutes 2 or 3 times a day would be extremely helpful. Omega 3 fatty acids such as are found in fish oil also raise the HDL. Each capsule of fish oil should have 800 mg or more of a combination of EPA & DHA - the active omega 3's- per capsule and you take between 1 caps per day until the next test. Omega 3's also lower triglycerides and help lubricate joints to lower arthritis pain. Coronary artery disease involving native coronary artery of native heart without angina pectoris  Raise HDL    Essential hypertension  -     Comprehensive Metabolic Panel; Standing  -     Good control.  -     Continue meds and lifestyle control. Dermatomyositis ([address]  -     Stable. -     Continue meds and lifestyle control. Anxiety    Vitamin D deficiency  -     Vitamin D 25 Hydroxy; Future  Recheck. Psychophysiological insomnia  -     Good control.  -     Continue meds and lifestyle control. Frequent PVCs  -     Comprehensive Metabolic Panel; Standing  Stable. Onychomycosis  Terbinafine 250 mg daily  Cut cholesterol med in 1/2 while on this med. Patient Education        Well Visit, Over 72: Care Instructions  Overview     Well visits can help you stay healthy. Your doctor has checked your overall health and may have suggested ways to take good care of yourself.  Your doctor also may have recommended tests. At home, you can help prevent illness with healthy eating, regular exercise, and other steps. Follow-up care is a key part of your treatment and safety. Be sure to make and go to all appointments, and call your doctor if you are having problems. It's also a good idea to know your test results and keep a list of the medicines you take. How can you care for yourself at home? · Get screening tests that you and your doctor decide on. Screening helps find diseases before any symptoms appear. · Eat healthy foods. Choose fruits, vegetables, whole grains, protein, and low-fat dairy foods. Limit fat, especially saturated fat. Reduce salt in your diet. · Limit alcohol. If you are a man, have no more than 2 drinks a day or 14 drinks a week. If you are a woman, have no more than 1 drink a day or 7 drinks a week. Since alcohol affects older adults differently, you may want to limit alcohol even more. Or you may not want to drink at all. · Get at least 30 minutes of exercise on most days of the week. Walking is a good choice. You also may want to do other activities, such as running, swimming, cycling, or playing tennis or team sports. · Reach and stay at a healthy weight. This will lower your risk for many problems, such as obesity, diabetes, heart disease, and high blood pressure. · Do not smoke. Smoking can make health problems worse. If you need help quitting, talk to your doctor about stop-smoking programs and medicines. These can increase your chances of quitting for good. · Care for your mental health. It is easy to get weighed down by worry and stress. Learn strategies to manage stress, like deep breathing and mindfulness, and stay connected with your family and community. If you find you often feel sad or hopeless, talk with your doctor. Treatment can help. · Talk to your doctor about whether you have any risk factors for sexually transmitted infections (STIs).  You can help prevent STIs if you wait to have sex with a new partner (or partners) until you've each been tested for STIs. It also helps if you use condoms (male or female condoms) and if you limit your sex partners to one person who only has sex with you. Vaccines are available for some STIs. · If you think you may have a problem with alcohol or drug use, talk to your doctor. This includes prescription medicines (such as amphetamines and opioids) and illegal drugs (such as cocaine and methamphetamine). Your doctor can help you figure out what type of treatment is best for you. · Protect your skin from too much sun. When you're outdoors from 10 a.m. to 4 p.m., stay in the shade or cover up with clothing and a hat with a wide brim. Wear sunglasses that block UV rays. Even when it's cloudy, put broad-spectrum sunscreen (SPF 30 or higher) on any exposed skin. · See a dentist one or two times a year for checkups and to have your teeth cleaned. · Wear a seat belt in the car. When should you call for help? Watch closely for changes in your health, and be sure to contact your doctor if you have any problems or symptoms that concern you. Where can you learn more? Go to [email or web address] org and sign in to your [employer]igo account. Enter C811 in the Bluebox Now! box to learn more about \"Well Visit, Over 65: Care Instructions. \"     If you do not have an account, please click on the \"Sign Up Now\" link. Current as of: October 6, 2021               Content Version: 13.1  © 5013-1980 Healthwise, Incorporated. Care instructions adapted under license by [facility] ([facility]). If you have questions about a medical condition or this instruction, always ask your healthcare professional. [address] any warranty or liability for your use of this information.

## 2022-01-18 DIAGNOSIS — F51.04 PSYCHOPHYSIOLOGICAL INSOMNIA: Primary | ICD-10-CM

## 2022-01-18 RX ORDER — ZOLPIDEM TARTRATE 10 MG/1
TABLET ORAL
Qty: 30 TABLET | Refills: 5 | Status: SHIPPED | OUTPATIENT
Start: 2022-01-18 | End: 2022-07-18 | Stop reason: SDUPTHER

## 2022-01-18 NOTE — TELEPHONE ENCOUNTER
LAST VISIT 01/13/2022 WITH DR Kenneth Carcamo FOR PHYSICAL, NEXT VISIT NONE.   [address] [facility]

## 2022-01-20 ENCOUNTER — NURSE ONLY (OUTPATIENT)
Dept: FAMILY MEDICINE CLINIC | Age: 67
End: 2022-01-20
Payer: COMMERCIAL

## 2022-01-20 DIAGNOSIS — I49.3 FREQUENT PVCS: ICD-10-CM

## 2022-01-20 DIAGNOSIS — B35.1 ONYCHOMYCOSIS: ICD-10-CM

## 2022-01-20 DIAGNOSIS — E78.2 MIXED HYPERLIPIDEMIA: ICD-10-CM

## 2022-01-20 DIAGNOSIS — I10 ESSENTIAL HYPERTENSION: ICD-10-CM

## 2022-01-20 DIAGNOSIS — E55.9 VITAMIN D DEFICIENCY: ICD-10-CM

## 2022-01-20 LAB
A/G RATIO: 2 (ref 1.1–2.2)
ALBUMIN SERPL-MCNC: 4.4 G/DL (ref 3.4–5)
ALP BLD-CCNC: 88 U/L (ref 40–129)
ALT SERPL-CCNC: 44 U/L (ref 10–40)
ANION GAP SERPL CALCULATED.3IONS-SCNC: 14 MMOL/L (ref 3–16)
AST SERPL-CCNC: 26 U/L (ref 15–37)
BILIRUB SERPL-MCNC: 0.7 MG/DL (ref 0–1)
BILIRUBIN DIRECT: <0.2 MG/DL (ref 0–0.3)
BILIRUBIN, INDIRECT: ABNORMAL MG/DL (ref 0–1)
BUN BLDV-MCNC: 20 MG/DL (ref 7–20)
CALCIUM SERPL-MCNC: 9.7 MG/DL (ref 8.3–10.6)
CHLORIDE BLD-SCNC: 98 MMOL/L (ref 99–110)
CHOLESTEROL, TOTAL: 151 MG/DL (ref 0–199)
CO2: 30 MMOL/L (ref 21–32)
CREAT SERPL-MCNC: 1.3 MG/DL (ref 0.8–1.3)
GFR AFRICAN AMERICAN: >60
GFR NON-AFRICAN AMERICAN: 55
GLUCOSE BLD-MCNC: 120 MG/DL (ref 70–99)
HDLC SERPL-MCNC: 38 MG/DL (ref 40–60)
LDL CHOLESTEROL CALCULATED: 88 MG/DL
POTASSIUM SERPL-SCNC: 3.8 MMOL/L (ref 3.5–5.1)
SODIUM BLD-SCNC: 142 MMOL/L (ref 136–145)
TOTAL PROTEIN: 6.6 G/DL (ref 6.4–8.2)
TRIGL SERPL-MCNC: 124 MG/DL (ref 0–150)
VITAMIN D 25-HYDROXY: 93.2 NG/ML
VLDLC SERPL CALC-MCNC: 25 MG/DL

## 2022-01-20 PROCEDURE — 36415 COLL VENOUS BLD VENIPUNCTURE: CPT | Performed by: FAMILY MEDICINE

## 2022-02-01 RX ORDER — HYDROCHLOROTHIAZIDE 25 MG/1
TABLET ORAL
Qty: 28 TABLET | Refills: 0 | Status: SHIPPED | OUTPATIENT
Start: 2022-02-01 | End: 2022-03-09

## 2022-02-16 RX ORDER — ATORVASTATIN CALCIUM 40 MG/1
TABLET, FILM COATED ORAL
Qty: 90 TABLET | Refills: 0 | Status: SHIPPED | OUTPATIENT
Start: 2022-02-16 | End: 2022-05-19

## 2022-02-16 NOTE — TELEPHONE ENCOUNTER
LAST VISIT 01/13/2022 DR. PARK, NEXT VISIT NONE.        Component      Latest Ref Rng & Units 1/20/2022          11:30 AM   Cholesterol, Total      0 - 199 mg/dL 151   Triglycerides      0 - 150 mg/dL 124   HDL Cholesterol      40 - 60 mg/dL 38 (L)   LDL Calculated      <100 mg/dL 88   VLDL Cholesterol Calculated      Not Established mg/dL 25

## 2022-03-03 ENCOUNTER — OFFICE VISIT (OUTPATIENT)
Dept: ENT CLINIC | Age: 67
End: 2022-03-03
Payer: COMMERCIAL

## 2022-03-03 VITALS
RESPIRATION RATE: 16 BRPM | HEART RATE: 63 BPM | SYSTOLIC BLOOD PRESSURE: 167 MMHG | WEIGHT: 225 LBS | OXYGEN SATURATION: 96 % | DIASTOLIC BLOOD PRESSURE: 100 MMHG | HEIGHT: 73 IN | BODY MASS INDEX: 29.82 KG/M2

## 2022-03-03 DIAGNOSIS — H69.83 DYSFUNCTION OF BOTH EUSTACHIAN TUBES: ICD-10-CM

## 2022-03-03 DIAGNOSIS — H91.93 BILATERAL HEARING LOSS, UNSPECIFIED HEARING LOSS TYPE: Primary | ICD-10-CM

## 2022-03-03 PROCEDURE — 99213 OFFICE O/P EST LOW 20 MIN: CPT | Performed by: OTOLARYNGOLOGY

## 2022-03-03 RX ORDER — AMOXICILLIN 500 MG/1
CAPSULE ORAL
COMMUNITY
Start: 2022-02-25 | End: 2022-03-11 | Stop reason: ALTCHOICE

## 2022-03-03 NOTE — PROGRESS NOTES
[address] [facility]  Follow up      Patient Name: Jonathan Coats  Medical Record Number:  <[id number]>  Primary Care Physician:  Bonita Best DO  Date of Consultation: 3/3/2022    Chief Complaint: Ear issues      Interval History    Patient following up for his ear. I saw him in July 2021. He has what sounds like an multiple decade long history of eustachian dysfunction. He had a couple different sets of ear tubes. Currently he has a tube in the right side but not the left. He said the left doing okay, but he knows that every spring it becomes an issue again. His hearing seems to be stable. REVIEW OF SYSTEMS  As above    PHYSICAL EXAM  GENERAL: No Acute Distress, Alert and Oriented, no Hoarseness, strong voice  EYES: EOMI, Anti-icteric  HENT:   Head: Normocephalic and atraumatic. Face:  Symmetric, facial nerve intact, no sinus tenderness   ears: See below  Mouth/Oral Cavity:  normal lips, Uvula is midline, no mucosal lesions  Oropharynx/Larynx:  normal oropharynx  Nose:Normal external nasal appearance. NECK: Normal range of motion, no thyromegaly, trachea is midline, no lymphadenopathy, no neck masses, no crepitus    PROCEDURE    Bilateral ear exam  Right ear is visualized binoculars ago. Patient still has an ear tube in place and patent very anteriorly. On the left side no ear tube. Middle ear appears to be aerated. I do not see a perforation. ASSESSMENT/PLAN  1. Bilateral hearing loss, unspecified hearing loss type  The patient has what sounds like a multiple decade long history of eustachian tube dysfunction. He is only had 2 sets of ear tubes, but has had symptoms much longer. He is very interested in anything else that can be done other than just tubes. I think that he is a good candidate for eustachian tube dilation. He understands the main risk of the eustachian dilation is that it will not work.   This would mean that despite the dilation he will need another set of ear tubes. He also still has  an ear tube left on the right side. He said he would like for me to remove it when he is asleep. I think this is a reasonable option. The patient is interested. We will do this under general anesthesia. I would like for him to get an audiogram prior to that that way we know exactly what his hearing as before we do any other interventions  2. Dysfunction of both eustachian tubes  As above             I have performed a head and neck physical exam personally or was physically present during the key or critical portions of the service. This note was generated completely or in part utilizing Dragon dictation speech recognition software. Occasionally, words are mistranscribed and despite editing, the text may contain inaccuracies due to incorrect word recognition. If further clarification is needed please contact the office at [phone].

## 2022-03-07 DIAGNOSIS — Z41.9 SURGERY, ELECTIVE: Primary | ICD-10-CM

## 2022-03-09 RX ORDER — HYDROCHLOROTHIAZIDE 25 MG/1
TABLET ORAL
Qty: 28 TABLET | Refills: 0 | Status: SHIPPED | OUTPATIENT
Start: 2022-03-09 | End: 2022-04-08

## 2022-03-11 NOTE — PROGRESS NOTES
GE JIMÉNEZ :  1955    DOS:  3/29/22    APPT WITH DR Luis Terrell ON 3/24/22 FOR MEDICAL CLEARANCE AND COVID TESTING --SHOULD BE IN EPIC

## 2022-03-11 NOTE — PROGRESS NOTES
Covid testing to be done @  If positive---Pt instructed to notify MD ASAP   If negative--pt was instructed to bring results DOP/DOS           [address] time___0925_________        Surgery time_______1125_____    Take the following medications with a sip of water: Follow your MD/Surgeons pre-procedure instructions regarding your medications     Do not eat or drink anything after 12:00 midnight prior to your surgery. This includes water chewing gum, mints and ice chips. You may brush your teeth and gargle the morning of your surgery, but do not swallow the water     Please see your family doctor/pediatrician for a history and physical and/or concerning medications. Bring any test results/reports from your physicians office. If you are under the care of a heart doctor or specialist doctor, please be aware that you may be asked to them for clearance    You may be asked to stop blood thinners such as Coumadin, Plavix, Fragmin, Lovenox, etc., or any anti-inflammatories such as:  Aspirin, Ibuprofen, Advil, Naproxen prior to your surgery. We also ask that you stop any OTC medications such as fish oil, vitamin E, glucosamine, garlic, Multivitamins, COQ 10, etc.    We ask that you do not smoke 24 hours prior to surgery  We ask that you do not  drink any alcoholic beverages 24 hours prior to surgery     You must make arrangements for a responsible adult to take you home after your surgery. For your safety you will not be allowed to leave alone or drive yourself home. Your surgery will be cancelled if you do not have a ride home. Also for your safety, it is strongly suggested that someone stay with you the first 24 hours after your surgery. A parent or legal guardian must accompany a child scheduled for surgery and plan to stay at the hospital until the child is discharged. Please do not bring other children with you.     For your comfort, please wear simple loose fitting clothing to the hospital.  Please do not bring valuables. Do not wear any make-up or nail polish on your fingers or toes      For your safety, please do not wear any jewelry or body piercing's on the day of surgery. All jewelry must be removed. If you have dentures, they will be removed before going to operating room. For your convenience, we will provide you with a container. If you wear contact lenses or glasses, they will be removed, please bring a case for them. If you have a living will and a durable power of  for healthcare, please bring in a copy. As part of our patient safety program to minimize surgical site infections, we ask you to do the following:    · Please notify your surgeon if you develop any illness between         now and the  day of your surgery. · This includes a cough, cold, fever, sore throat, nausea,         or vomiting, and diarrhea, etc.  ·  Please notify your surgeon if you experience dizziness, shortness         of breath or blurred vision between now and the time of your surgery. Do not shave your operative site 96 hours prior to surgery. For face and neck surgery, men may use an electric razor 48 hours   prior to surgery. You may shower the night before surgery or the morning of   your surgery with an antibacterial soap. You will need to bring a photo ID and insurance card    [facility] has an onsite pharmacy, would you like to utilize our pharmacy     If you will be staying overnight and use a C-pap machine, please bring   your C-pap to hospital     Our goal is to provide you with excellent care, therefore, visitors will be limited to two(2) in the room at a time so that we may focus on providing this care for you. Please contact pre-admission testing if you have any further questions.                  [facility] phone number:  [phone] PAT fax number:  [phone]  Please note these are generalized instructions for all surgical cases, you may be provided with more specific instructions according to your surgery. C-Difficile admission screening and protocol:       * Admitted with diarrhea? [] YES    []  NO     *Prior history of C-Diff. In last 3 months? [] YES    []  NO     *Antibiotic use in the past 6-8 weeks? []  NO    []  YES                 If yes, which ANTIBIOTIC AND REASON______     *Prior hospitalization or nursing home in the last month? []  YES    []  NO        SAFETY FIRST. .call before you fall

## 2022-03-17 DIAGNOSIS — I25.10 CORONARY ARTERY DISEASE INVOLVING NATIVE CORONARY ARTERY OF NATIVE HEART WITHOUT ANGINA PECTORIS: Chronic | ICD-10-CM

## 2022-03-17 DIAGNOSIS — I10 ESSENTIAL HYPERTENSION: Chronic | ICD-10-CM

## 2022-03-17 RX ORDER — METOPROLOL TARTRATE 50 MG/1
TABLET, FILM COATED ORAL
Qty: 270 TABLET | Refills: 1 | Status: SHIPPED | OUTPATIENT
Start: 2022-03-17 | End: 2022-09-27

## 2022-03-24 ENCOUNTER — TELEPHONE (OUTPATIENT)
Dept: ENT CLINIC | Age: 67
End: 2022-03-24

## 2022-03-24 ENCOUNTER — OFFICE VISIT (OUTPATIENT)
Dept: FAMILY MEDICINE CLINIC | Age: 67
End: 2022-03-24
Payer: COMMERCIAL

## 2022-03-24 VITALS
OXYGEN SATURATION: 97 % | WEIGHT: 226 LBS | HEIGHT: 72 IN | HEART RATE: 68 BPM | SYSTOLIC BLOOD PRESSURE: 120 MMHG | DIASTOLIC BLOOD PRESSURE: 80 MMHG | BODY MASS INDEX: 30.61 KG/M2 | RESPIRATION RATE: 16 BRPM

## 2022-03-24 DIAGNOSIS — M33.90 DERMATOMYOSITIS (HCC): ICD-10-CM

## 2022-03-24 DIAGNOSIS — I45.10 RIGHT BUNDLE BRANCH BLOCK: ICD-10-CM

## 2022-03-24 DIAGNOSIS — I49.3 FREQUENT PVCS: ICD-10-CM

## 2022-03-24 DIAGNOSIS — E34.9 TESTOSTERONE DEFICIENCY: ICD-10-CM

## 2022-03-24 DIAGNOSIS — I25.10 CORONARY ARTERY DISEASE INVOLVING NATIVE CORONARY ARTERY OF NATIVE HEART WITHOUT ANGINA PECTORIS: ICD-10-CM

## 2022-03-24 DIAGNOSIS — E78.2 MIXED HYPERLIPIDEMIA: ICD-10-CM

## 2022-03-24 DIAGNOSIS — H69.83 ETD (EUSTACHIAN TUBE DYSFUNCTION), BILATERAL: Primary | ICD-10-CM

## 2022-03-24 DIAGNOSIS — I10 ESSENTIAL HYPERTENSION: ICD-10-CM

## 2022-03-24 DIAGNOSIS — Z01.818 PREOP GENERAL PHYSICAL EXAM: ICD-10-CM

## 2022-03-24 LAB
A/G RATIO: 2.2 (ref 1.1–2.2)
ALBUMIN SERPL-MCNC: 4.4 G/DL (ref 3.4–5)
ALP BLD-CCNC: 84 U/L (ref 40–129)
ALT SERPL-CCNC: 34 U/L (ref 10–40)
ANION GAP SERPL CALCULATED.3IONS-SCNC: 14 MMOL/L (ref 3–16)
AST SERPL-CCNC: 26 U/L (ref 15–37)
BASOPHILS ABSOLUTE: 0 K/UL (ref 0–0.2)
BASOPHILS RELATIVE PERCENT: 1 %
BILIRUB SERPL-MCNC: 0.7 MG/DL (ref 0–1)
BUN BLDV-MCNC: 20 MG/DL (ref 7–20)
CALCIUM SERPL-MCNC: 9.5 MG/DL (ref 8.3–10.6)
CHLORIDE BLD-SCNC: 100 MMOL/L (ref 99–110)
CO2: 24 MMOL/L (ref 21–32)
CREAT SERPL-MCNC: 1.3 MG/DL (ref 0.8–1.3)
EOSINOPHILS ABSOLUTE: 0.2 K/UL (ref 0–0.6)
EOSINOPHILS RELATIVE PERCENT: 3.5 %
GFR AFRICAN AMERICAN: >60
GFR NON-AFRICAN AMERICAN: 55
GLUCOSE BLD-MCNC: 87 MG/DL (ref 70–99)
HCT VFR BLD CALC: 53.7 % (ref 40.5–52.5)
HEMOGLOBIN: 18.6 G/DL (ref 13.5–17.5)
LYMPHOCYTES ABSOLUTE: 1.6 K/UL (ref 1–5.1)
LYMPHOCYTES RELATIVE PERCENT: 33.1 %
MCH RBC QN AUTO: 31.2 PG (ref 26–34)
MCHC RBC AUTO-ENTMCNC: 34.6 G/DL (ref 31–36)
MCV RBC AUTO: 90.3 FL (ref 80–100)
MONOCYTES ABSOLUTE: 0.6 K/UL (ref 0–1.3)
MONOCYTES RELATIVE PERCENT: 11.6 %
NEUTROPHILS ABSOLUTE: 2.4 K/UL (ref 1.7–7.7)
NEUTROPHILS RELATIVE PERCENT: 50.8 %
PDW BLD-RTO: 15.5 % (ref 12.4–15.4)
PLATELET # BLD: 196 K/UL (ref 135–450)
PMV BLD AUTO: 8.2 FL (ref 5–10.5)
POTASSIUM SERPL-SCNC: 3.9 MMOL/L (ref 3.5–5.1)
RBC # BLD: 5.95 M/UL (ref 4.2–5.9)
SODIUM BLD-SCNC: 138 MMOL/L (ref 136–145)
TOTAL PROTEIN: 6.4 G/DL (ref 6.4–8.2)
WBC # BLD: 4.8 K/UL (ref 4–11)

## 2022-03-24 PROCEDURE — 99214 OFFICE O/P EST MOD 30 MIN: CPT | Performed by: FAMILY MEDICINE

## 2022-03-24 PROCEDURE — 93000 ELECTROCARDIOGRAM COMPLETE: CPT | Performed by: FAMILY MEDICINE

## 2022-03-24 NOTE — TELEPHONE ENCOUNTER
Called and spoke to Mauricio and let him know that he didn't  have to get covid test and there will be a time change I will call him back when I get the time for him

## 2022-03-24 NOTE — PROGRESS NOTES
before surgery. 3. Prophylaxis for cardiac events with perioperative beta-blockers: on metoprolol 50 mg 3/day. 4. Deep vein thrombosis prophylaxis postoperatively: regimen to be chosen by surgical team.  5. Other measures: none. Return in about 4 months (around 7/13/2022) for Hypertension, Cholesterol. Subjective   SUBJECTIVE/OBJECTIVE:  [address]  [phone]  [facility] Sample  YOB: 1955    Chief Complaint   Patient presents with    Pre-op Exam     PT HERE FOR PRE OP EXAM AT THE REQUEST OF DR. ANTONIO FOR HISBILATERAL EUSTACHIAN TUBE DILATION RIGHT EAR EXAM WITH REMOVAL OF RETAINED EAR TUBE TO BE DONE 3/29/22 AT [facility]       No Known Allergies  Current Outpatient Medications   Medication Sig Dispense Refill    metoprolol tartrate (LOPRESSOR) 50 MG tablet TAKE 1 TABLET BY MOUTH 3 TIMES A  tablet 1    hydroCHLOROthiazide (HYDRODIURIL) 25 MG tablet TAKE 1 TABLET BY MOUTH ONE TIME A DAY **MUST MAKE APPOINTMENT FOR FURTHER FILLS** 28 tablet 0    atorvastatin (LIPITOR) 40 MG tablet TAKE 1 TABLET BY MOUTH ONE TIME A DAY 90 tablet 0    zolpidem (AMBIEN) 10 MG tablet TAKE ONE-HALF TABLET BY MOUTH EVERY EVENING AS NEEDED FOR SLEEP 30 tablet 5    terbinafine (LAMISIL) 250 MG tablet Take 1 tablet by mouth daily 84 tablet 0    folic acid (FOLVITE) 1 MG tablet TAKE 1 TABLET BY MOUTH ONE TIME A DAY 90 tablet 0    clomiPRAMINE (ANAFRANIL) 25 MG capsule TAKE THREE CAPSULES BY MOUTH EVERY EVENING (Patient taking differently: Take 50 mg by mouth nightly ) 270 capsule 1    clobetasol (TEMOVATE) 0.05 % cream Apply topically 2 times daily Apply topically 2 times daily. 120 g 2    triamcinolone (KENALOG) 0.1 % cream Apply topically 2 times daily Apply topically 2 times daily.  80 g 2    sildenafil (REVATIO) 20 MG tablet Take 1-5 tablets by mouth as needed  3    thyroid (ARMOUR) 2021    Homocystine 20 on 21.  Hyperlipidemia     Hypertension     Hypothyroidism     Insomnia     Osteoarthritis     Right bundle branch block left axis -bifascicular block 2019    -Right bundle branch block with .  Spinal cord tumor     Testosterone deficiency     Wears glasses      Past Surgical History:   Procedure Laterality Date    CARDIAC CATHETERIZATION  2019    COLONOSCOPY  2012    Dr. Keyshawn Cheema COLONOSCOPY  2005    Dr. Carli Muniz, adenomatous polyps    COLONOSCOPY  2009    Dr. Mago Sheikh  2015    Dr. Carli Muniz, recheck 2020    LUMBAR LAMINECTOMY  2008    L1-L2    NASAL SEPTUM SURGERY  1976    NERVE BIOPSY      Spine:  myxopapillary ependymoma     PROSTATE SURGERY N/A 2021    UROLIFT at urology center [place]. Dr Zenia Mcnair.     TYMPANOSTOMY TUBE PLACEMENT Bilateral 2017    UPPER GASTROINTESTINAL ENDOSCOPY  2012    Dr. Carli Muniz     Family History   Problem Relation Age of Onset    Diabetes Mother     Breast Cancer Mother     Cancer Mother         bladder    Coronary Art Dis Father     Colon Cancer Father     Heart Surgery Brother 54        CABG    Coronary Art Dis Brother 54        Stent @70    High Cholesterol Brother     No Known Problems Brother     Depression Daughter     Anxiety Disorder Daughter     Eczema Daughter     Depression Son     Anxiety Disorder Son     Asthma Son      Social History     Socioeconomic History    Marital status:      Spouse name: Not on file    Number of children: 3    Years of education: Not on file    Highest education level: Not on file   Occupational History    Occupation: Surgical asst     Employer: [employer]     Comment: 24 hr//wk   Tobacco Use    Smoking status: Former Smoker     Packs/day: 1.00     Years: 33.00     Pack years: 33.00     Types: Cigarettes     Start date: 1972     Quit date:      Years since quittin.2    Smokeless tobacco: Never Used   Vaping Use    Vaping Use: Never used   Substance and Sexual Activity    Alcohol use: Yes     Alcohol/week: 5.0 standard drinks     Types: 5 Shots of liquor per week     Comment: OCCASIONAL.  Drug use: Never    Sexual activity: Yes     Partners: Female   Other Topics Concern    Not on file   Social History Narrative    Swim weekly 1 hr, bike 1/2 hr 2x/wk, weights 1x/wk x 45 min. Walking 3 mi 2x/wk. 12/3/20. Biking 2-3x/wk for 8 mi. [employer] weights weekly for 1 hr. Then walking when able. 6/14/21. Stationary bike 30 2x/wk. Walking 1 mi 3x/wk if not too cold. Weights 2x/wk 30 min. 1/13/22.3/24/22. Social Determinants of Health     Financial Resource Strain:     Difficulty of Paying Living Expenses: Not on file   Food Insecurity:     Worried About Running Out of Food in the Last Year: Not on file    Renetta of Food in the Last Year: Not on file   Transportation Needs:     Lack of Transportation (Medical): Not on file    Lack of Transportation (Non-Medical):  Not on file   Physical Activity:     Days of Exercise per Week: Not on file    Minutes of Exercise per Session: Not on file   Stress:     Feeling of Stress : Not on file   Social Connections:     Frequency of Communication with Friends and Family: Not on file    Frequency of Social Gatherings with Friends and Family: Not on file    Attends Confucianist Services: Not on file    Active Member of [address] [place] [address] or Organizations: Not on file    Attends Club or Organization Meetings: Not on file    Marital Status: Not on file   Intimate Partner Violence:     Fear of Current or Ex-Partner: Not on file    Emotionally Abused: Not on file    Physically Abused: Not on file    Sexually Abused: Not on file   Housing Stability:     Unable to Pay for Housing in the Last Year: Not on file    Number of [place] in the Last Year: Not on file    Unstable Housing in the Last Year: Not on file       Review of Systems Review of systems in history of present illness or scanned in under media tab. Objective   Physical Exam  Vitals and nursing note reviewed. Constitutional:       General: He is not in acute distress. Appearance: Normal appearance. He is well-developed. He is not diaphoretic. HENT:      Right Ear: Tympanic membrane, ear canal and external ear normal. No middle ear effusion. Tympanic membrane is not retracted or bulging. Left Ear: Tympanic membrane, ear canal and external ear normal.  No middle ear effusion. Tympanic membrane is not retracted or bulging. Nose: Nose normal. No mucosal edema or rhinorrhea. Mouth/Throat:      Mouth: Mucous membranes are not pale, not dry and not cyanotic. No oral lesions. Dentition: No dental caries. Pharynx: Uvula midline. No oropharyngeal exudate, posterior oropharyngeal erythema or uvula swelling. Tonsils: No tonsillar abscesses. Eyes:      General: No scleral icterus. Right eye: No discharge. Left eye: No discharge. Conjunctiva/sclera: Conjunctivae normal.      Right eye: Right conjunctiva is not injected. No exudate. Left eye: Left conjunctiva is not injected. No exudate. Pupils: Pupils are equal, round, and reactive to light. Neck:      Thyroid: No thyroid mass or thyromegaly. Trachea: Trachea and phonation normal. No tracheal tenderness or tracheal deviation. Cardiovascular:      Rate and Rhythm: Normal rate and regular rhythm. No extrasystoles are present. Heart sounds: S1 normal. Heart sounds not distant. No murmur heard. No friction rub. No gallop. No S3 or S4 sounds. Pulmonary:      Effort: Pulmonary effort is normal. No respiratory distress. Breath sounds: Normal breath sounds. No stridor. No decreased breath sounds, wheezing, rhonchi or rales. Chest:   Breasts:      Right: No supraclavicular adenopathy. Left: No supraclavicular adenopathy. Abdominal:      General: Abdomen is protuberant. There is no distension. Palpations: Abdomen is not rigid. There is no hepatomegaly, splenomegaly, mass or pulsatile mass. Tenderness: There is no abdominal tenderness. There is no right CVA tenderness, left CVA tenderness, guarding or rebound. Negative signs include Greene's sign and McBurney's sign. Musculoskeletal:      Cervical back: Neck supple. Right lower leg: No edema. Left lower leg: No edema. Lymphadenopathy:      Head:      Right side of head: No submandibular or tonsillar adenopathy. Left side of head: No submandibular or tonsillar adenopathy. Cervical: No cervical adenopathy. Right cervical: No superficial, deep or posterior cervical adenopathy. Left cervical: No superficial, deep or posterior cervical adenopathy. Upper Body:      Right upper body: No supraclavicular or pectoral adenopathy. Left upper body: No supraclavicular or pectoral adenopathy. Skin:     General: Skin is warm and dry. Coloration: Skin is not pale. Findings: No lesion. Nails: There is no clubbing. Neurological:      Mental Status: He is alert. Motor: No tremor or abnormal muscle tone. Coordination: Coordination normal.      Gait: Gait normal.      Deep Tendon Reflexes:      Reflex Scores:       Patellar reflexes are 2+ on the right side and 2+ on the left side. Psychiatric:         Attention and Perception: Attention and perception normal.         Mood and Affect: Mood and affect normal.         Speech: Speech normal.         Behavior: Behavior normal. Behavior is cooperative.          Cognition and Memory: Cognition and memory normal.         Judgment: Judgment normal.       Vitals:    03/24/22 1356   BP: 120/80   Site: Right Upper Arm   Position: Sitting   Cuff Size: Medium Adult   Pulse: 68   Resp: 16   SpO2: 97%   Weight: 226 lb (102.5 kg)   Height: 6' (1.829 m)     BP Readings from Last 3 Encounters:   03/24/22 120/80   03/03/22 (!) 167/100 01/13/22 122/74     Pulse Readings from Last 3 Encounters:   03/24/22 68   03/03/22 63   01/13/22 66     Wt Readings from Last 3 Encounters:   03/24/22 226 lb (102.5 kg)   03/03/22 225 lb (102.1 kg)   01/13/22 220 lb 3.2 oz (99.9 kg)     Body mass index is 30.65 kg/m². 1/20/2022 11:30   CHOLESTEROL, TOTAL, 151   HDL Cholesterol 38 (L)   LDL Calculated 88   Triglycerides 124      3/24/2022 15:55   Sodium 138   Potassium 3.9   Chloride 100   CO2 24   BUN,BUNPL 20   Creatinine 1.3   Anion Gap 14   GFR Non- 55 (A)   GLUCOSE, FASTING,GF 87   CALCIUM, SERUM, 057211 9.5   Total Protein 6.4   Albumin 4.4   Albumin/Globulin Ratio 2.2   Alk Phos 84   ALT 34   AST 26   Bilirubin 0.7   WBC 4.8   RBC 5.95 (H)   Hemoglobin Quant 18.6 (H)   Hematocrit 53.7 (H)   MCV 90.3   MCH 31.2   MCHC 34.6   MPV 8.2   RDW 15.5 (H)   Platelet Count 074   Neutrophils % 50.8   Lymphocyte % 33.1   Monocytes % 11.6   Eosinophils % 3.5   Basophils % 1.0   Neutrophils Absolute 2.4   Lymphocytes Absolute 1.6   Monocytes Absolute 0.6   Eosinophils Absolute 0.2   Basophils Absolute 0.0     EKG 3/24/2022  Sinus  Rhythm   -Right bundle branch block with left axis -bifascicular block. ABNORMAL. When compared with prior EKG 5/17/2019 T wave is now inverted in lead III and aVF and flattened in lead II likely representing a strain pattern. Cardiac Cath Results 5/7/2019:  Left ventricular pressure 6 mmHg  Aortic pressure 142 mmHg  Coronary anatomy:   The left main coronary artery is normal.   Left anterior descending artery has proximal narrowing 20-25% IFR was 0.96  Circumflex artery is normal and codominant  The right coronary artery has awkward anterior takeoff and  is a co- dominant vessel and normal.   Left ventriculogram shows ejection fraction of 55%. Normal wall motion.   Impression:  Mild CAD and no intervention     On this date 3/24/2022 I have spent 78 minutes reviewing previous notes, test results and face to face with the patient discussing the diagnosis and importance of compliance with the treatment plan as well as documenting on the day of the visit. An electronic signature was used to authenticate this note.     --Chauncey Mcmillan, DO

## 2022-03-24 NOTE — PATIENT INSTRUCTIONS
Martha Carr was seen today for pre-op exam.    Diagnoses and all orders for this visit:    ETD (Eustachian tube dysfunction), bilateral  With hearing loss. Preop general physical exam   Clear for surgery with stable EKG and baseline labs. Clear for surgery. Essential hypertension  -     Good control.  -     Continue meds and lifestyle control. Mixed hyperlipidemia  -     Good control.  -     Continue meds and lifestyle control. Coronary artery disease involving native coronary artery of native heart without angina pectoris  Minimal blockage of LAD 20-25% found on cath. Frequent PVCs  EKG    Dermatomyositis ([address]  -     Good control. Zinc oxide cream to skin scab. -     Continue meds and lifestyle control. Right bundle branch block left axis -bifascicular block  EKG    Testosterone deficiency  On testosterone cream.     75 y.o. patient with planned surgery as above. Known risk factors for perioperative complications: None      Current medications which may produce withdrawal symptoms if withheld perioperatively: None. Plan:     1. Preoperative workup as follows: CBC, CMP, EKG. 2. Change in medication regimen before surgery: discontinue ASA 14d before surgery. 3. Prophylaxis for cardiac events with perioperative beta-blockers: on metoprolol 50 mg 3/day. 4. Deep vein thrombosis prophylaxis postoperatively: regimen to be chosen by surgical team.  5. Other measures: none.

## 2022-03-24 NOTE — Clinical Note
Fax to [phone] with review of systems and signed EKG (1 will bring Monday am.)  Surgery is 3/29/2022.

## 2022-03-24 NOTE — PROGRESS NOTES
Jet Anderson   1955, 75 y.o. male   [id number]       Referring Provider: Lars Zhang MD  Referral Type: In an order in [address] [place] [address]    Reason for Visit: Evaluation of suspected change in hearing, tinnitus, or balance. ADULT AUDIOLOGIC EVALUATION      Jet Anderson is a 75 y.o. male seen today, 3/28/2022 , for an initial audiologic evaluation. Patient was seen by Lars Zhang MD following today's evaluation. AUDIOLOGIC AND OTHER PERTINENT MEDICAL HISTORY:      Jet Anderson noted no significant change since last audiogram on 3/11/2020. Previous history and results from audiologic evaluation on 3/11/2020:  Nilsa Johnson noted aural fullness and history of ear surgery. Patient reports long standing history of ear infections and ETD AU. He states he last had bilateral PE Tubes placed about 3 years ago. Patient notes he is usually able to \"pop\" ears by using the valsalva technique. He states his hearing is better in the left ear. AD:Mild MHL, Good WRS, Type B tymp  AS:Mild SNHL rising to Hearing WNL sloping to Mild SNHL, Excellent WRS, Type C tymp    Date: 3/28/2022     IMPRESSIONS:      AD: Mild MHL, Excellent WRS, Type B tymp  AS: Mild SNHL rising to Hearing WNL sloping to Moderate SNHL, Excellent WRS, Type C tymp    Test results consistent with right Mixed hearing loss and left sensorineural hearing loss. Hearing loss significant enough to create hearing difficulty in some listening situations. Discussed hearing loss and hearing aids with patient. Patient to follow medical recommendations per Lars Zhang MD .    ASSESSMENT AND FINDINGS:     Otoscopy revealed: Clear ear canals bilaterally    RIGHT EAR:  Hearing Sensitivity: Mild   Mixed hearing loss  Speech Recognition Threshold: 40 dB HL  Word Recognition:Excellent (100%), based on NU-6 25-word list at 75m dBHL using recorded speech stimuli.     Tympanometry: Flat, no peak pressure or compliance, Type B tympanogram, with typical ear canal volume, consistent with middle ear fluid. Acoustic Reflexes: Ipsilateral: Could not maintain seal. Contralateral: Could not maintain seal.    LEFT EAR:  Hearing Sensitivity: Mild Sensorineural hearing loss rising to normal hearing sloping to Moderate Sensorineural hearing loss  Speech Recognition Threshold: 25 dB HL  Word Recognition:Excellent (100%), based on NU-6 25-word list at 60 dBHL using recorded speech stimuli. Tympanometry: Negative peak pressure with normal compliance, Type C tympanogram, consistent with ETD/history of otitis media. Acoustic Reflexes: Ipsilateral: Could not maintain seal. Contralateral: Could not maintain seal.    Reliability: Good  Transducer: Inserts    See scanned audiogram dated 3/28/2022  for results. PATIENT EDUCATION:     The following items were discussed with the patient:   - Good Communication Strategies  - Hearing Loss and Hearing Aids    Educational information was shared in the After Visit Summary. RECOMMENDATIONS:                                                                                                                                                                                                                                                          The following items are recommended based on patient report and results from today's appointment:   - Continue medical follow-up with Frankie Johnson MD.   - Retest hearing as medically indicated and/or sooner if a change in hearing is noted. - If desired, schedule a Hearing Aid Evaluation (HAE) appointment to discuss hearing aid options. - Utilize \"Good Communication Strategies\" as discussed to assist in speech understanding with communication partners.        Mal Doan  Audiologist    Chart CC'd to: Frankie Johnson MD      Degree of   Hearing Sensitivity dB Range   Within Normal Limits (WNL) 0 - 20   Mild 20 - 40   Moderate 40 - 55   Moderately-Severe 55 - 70   Severe 70 - 90   Profound 90 +

## 2022-03-25 DIAGNOSIS — H91.90 HEARING LOSS, UNSPECIFIED HEARING LOSS TYPE, UNSPECIFIED LATERALITY: Primary | ICD-10-CM

## 2022-03-28 ENCOUNTER — PROCEDURE VISIT (OUTPATIENT)
Dept: AUDIOLOGY | Age: 67
End: 2022-03-28
Payer: COMMERCIAL

## 2022-03-28 ENCOUNTER — ANESTHESIA EVENT (OUTPATIENT)
Dept: OPERATING ROOM | Age: 67
End: 2022-03-28
Payer: COMMERCIAL

## 2022-03-28 ENCOUNTER — OFFICE VISIT (OUTPATIENT)
Dept: ENT CLINIC | Age: 67
End: 2022-03-28
Payer: COMMERCIAL

## 2022-03-28 DIAGNOSIS — H90.A31 MIXED CONDUCTIVE AND SENSORINEURAL HEARING LOSS OF RIGHT EAR WITH RESTRICTED HEARING OF LEFT EAR: Primary | ICD-10-CM

## 2022-03-28 DIAGNOSIS — H90.A22 SENSORINEURAL HEARING LOSS (SNHL) OF LEFT EAR WITH RESTRICTED HEARING OF RIGHT EAR: ICD-10-CM

## 2022-03-28 DIAGNOSIS — H69.83 DYSFUNCTION OF BOTH EUSTACHIAN TUBES: Primary | ICD-10-CM

## 2022-03-28 PROCEDURE — 92567 TYMPANOMETRY: CPT | Performed by: AUDIOLOGIST

## 2022-03-28 PROCEDURE — 92557 COMPREHENSIVE HEARING TEST: CPT | Performed by: AUDIOLOGIST

## 2022-03-28 PROCEDURE — 99212 OFFICE O/P EST SF 10 MIN: CPT | Performed by: OTOLARYNGOLOGY

## 2022-03-28 NOTE — Clinical Note
Dr. Carlitos Jarquin,    Please see note from this patient's audiogram from today. Please let me know if there is anything further you need.         Mal Martinez  Audiologist

## 2022-03-28 NOTE — PATIENT INSTRUCTIONS
Good Communication Strategies    Communication can be challenging for anyone, but can be especially difficult for those with some degree of hearing loss. While we may not be able to control every factor that may lead to difficulty with communication, there are Good Communication Strategies that we can all use in our day-to-day lives. Communication takes both parties working together for it to be successful. Tips as a Listener:   1. Control your environment. It is important to limit the amount of background noise in the room when possible. You should also consider having a good light source in the room to best see the other person. 2. Ask for clarification. Instead of saying \"What?\", you can use parts of what you heard to make a new question. For example, if you heard the word \"Thursday\" but not the rest of the week, you may ask \"What was that about Thursday? \" or \"What did you want to do Thursday? \". This shows the person talking that you are listening and will help them better explain what they are saying. 3. Be an advocate for yourself. If you are hearing but not understanding, tell the other person \"I can hear you, but I need you to slow down when you speak. \"  Or if someone is facing the other direction, say \"I cannot hear you when you are not looking at me when we talk. \"       Tips as a Talker:   - Sit or stand 3 to 6 feet away to maximize audibility         -- It is unrealistic to believe someone else will fully hear your message if you are speaking from across the room or in a different room in the house   - Stay at eye level to help with visual cues   - Make sure you have the persons attention before speaking   - Use facial expressions and gestures to accentuate your message   - Raise your voice slightly (do not scream)   - Speak slowly and distinctly   - Use short, simple sentences   - Rephrase your words if the person is having a hard time understanding you    - To avoid distortion, dont speak directly into a persons ear      Some additional items that may be helpful:   - Remain patient - this is important for both parties   - Write down items that still cannot be heard/understood. You may write with pen/paper or consider typing/texting on a cell phone or smart device. - If background noise is unavoidable, try to keep yourself in a good position in the room. By sitting at a [place] on the side of the restaurant (preferably a corner), it will be easier to communicate than if you were sitting at a table in the middle with background noise surrounding you. Keep yourself positioned away from music speakers or heavy foot traffic. Hearing Loss: Care Instructions  Your Care Instructions      Hearing loss is a sudden or slow decrease in how well you hear. It can range from mild to profound. Permanent hearing loss can occur with aging, and it can happen when you are exposed long-term to loud noise. Examples include listening to loud music, riding motorcycles, or being around other loud machines. Hearing loss can affect your work and home life. It can make you feel lonely or depressed. You may feel that you have lost your independence. But hearing aids and other devices can help you hear better and feel connected to others. Follow-up care is a key part of your treatment and safety. Be sure to make and go to all appointments, and call your doctor if you are having problems. It's also a good idea to know your test results and keep a list of the medicines you take. How can you care for yourself at home? · Avoid loud noises whenever possible. This helps keep your hearing from getting worse. Always wear hearing protection around loud noises. · If appropriate, wear hearing aid(s) as directed. It is recommended that hearing aids are worn during all waking hours to keep your brain active and give it access to the sounds it is missing.       · If you are beginning your process with hearing aid(s), schedule a \"Hearing Aid Evaluation\" with an audiologist to discuss your lifestyle, features of hearing aid technology, and styles of hearing aids available. It is recommended that you contact your insurance company to determine if you have a hearing aid benefit, as this may dictate who you can see for these services. · Have hearing tests as your doctor suggests. They can show whether your hearing has changed. Your hearing aid may need to be adjusted. · Use other assistive devices as needed. These may include:  ? Telephone amplifiers and hearing aids that can connect to a television, stereo, radio, or microphone. ? Devices that use lights or vibrations. These alert you to the doorbell, a ringing telephone, or a baby monitor. ? Television closed-captioning. This shows the words at the bottom of the screen. Most new TVs can do this. ? TTY (text telephone). This lets you type messages back and forth on the telephone instead of talking or listening. These devices are also called TDD. When messages are typed on the keyboard, they are sent over the phone line to a receiving TTY. The message is shown on a monitor. · Use pagers, fax machines, text, and email if it is hard for you to communicate by telephone. · Try to learn a listening technique called speech-reading. It is not lip-reading. You pay attention to people's gestures, expressions, posture, and tone of voice. These clues can help you understand what a person is saying. Face the person you are talking to, and have him or her face you. Make sure the lighting is good. You need to see the other person's face clearly. · Think about counseling if you need help to adjust to your hearing loss. When should you call for help? Watch closely for changes in your health, and be sure to contact your doctor if:    · You think your hearing is getting worse. · You have new symptoms, such as dizziness or nausea.            Learning About Hearing Aids  What is a hearing aid?    A hearing aid makes sounds louder. It can help some people with hearing problems to hear better. Hearing aids do not restore normal hearing. But they can make it easier to communicate by making sounds clearer. · Digital programmable hearing aids can adjust themselves to work best where you are at any time. You also have more choices in setting them up than with analog hearing aids. There are also different styles of hearing aids. · A behind-the-ear (BTE) hearing aid connects to a plastic ear mold that fits inside the outer ear. BTE hearing aids are used for all levels of hearing loss, especially very severe hearing loss. They may be better for children for safety and growth reasons. Poorly fitting BTE ear molds or a buildup of earwax may cause a whistling sound (feedback). · An in-the-ear (ITE) hearing aid fits in the outer part of the ear. It can be used by people with mild to severe hearing loss. ITE hearing aids can be used with other hearing devices, such as a telecoil that improves hearing during phone calls. ITE hearing aids can be damaged by earwax and fluid draining from the ear. Their small size may be hard for some people to handle. They are not often used in children because the case must be replaced as the child grows. · An in-the-canal (ITC) hearing aid fits into the ear canal. ITC hearing aids are used by people with mild to moderate hearing loss. They are made to fit the shape and the size of your ear canal. They can be damaged by earwax and fluid draining from the ear. Their small size may be hard for some people to handle. They are not made for children. You have some options if you have a hearing problem and are thinking about getting hearing aids. You can go to your doctor or an audiologist. He or she will do a hearing test and help you decide which type and style of hearing aid may be best for you. What else should I know about hearing aids?   Find out if your insurance covers hearing aids. They can be expensive. Different types of hearing aids come with different costs. Also find out about a warranty or return policy in case you are not happy with your hearing aids. Follow-up care is a key part of your treatment and safety. Be sure to make and go to all appointments, and call your doctor if you are having problems. It's also a good idea to know your test results and keep a list of the medicines you take. Where can you learn more? Go to https://[employer]. org and sign in to your [employer] account. Enter X817 in the [employer] box to learn more about \"Learning About Hearing Aids. \"     If you do not have an account, please click on the \"Sign Up Now\" link. Current as of: October 21, 2018  Content Version: 12.1  © 6270-5271 Healthwise, Incorporated. Care instructions adapted under license by [facility] [place] ([facility]). If you have questions about a medical condition or this instruction, always ask your healthcare professional. [place] [address] any warranty or liability for your use of this information. If you are interested in pursuing hearing aids, here are the next steps:    1) Contact your insurance and ask, Do I have a benefit for hearing aids?    If the answer is no hearing aids will be a 100% out of pocket expense   If the answer is yes, ask Can I use this benefit at [facility] [place] ([facility])?  or Where can I use this benefit?  If [facility] is not in-network for hearing aids, your insurance should be able to provide the appropriate contact information for an in-network provider. 2) Call [facility] ([phone]) to schedule a Hearing Aid Evaluation    This appointment is when we will discuss hearing aid options and decide which device is most appropriate for you.

## 2022-03-28 NOTE — PROGRESS NOTES
Patient is following up for his ear issues. He is scheduled tomorrow for bilateral eustachian tube dysfunction and removal of any residual ear tubes. He got an audiogram today. Audiogram shows a mixed hearing loss on the right and a sensorineural hearing loss on the left. Plan  Plan eustachian dilation and removal of ear tube tomorrow. Patient agrees with plan.

## 2022-03-29 ENCOUNTER — HOSPITAL ENCOUNTER (OUTPATIENT)
Age: 67
Setting detail: OUTPATIENT SURGERY
Discharge: HOME | End: 2022-03-29
Attending: OTOLARYNGOLOGY | Admitting: OTOLARYNGOLOGY
Payer: COMMERCIAL

## 2022-03-29 ENCOUNTER — ANESTHESIA (OUTPATIENT)
Dept: OPERATING ROOM | Age: 67
End: 2022-03-29
Payer: COMMERCIAL

## 2022-03-29 VITALS
OXYGEN SATURATION: 93 % | WEIGHT: 225 LBS | DIASTOLIC BLOOD PRESSURE: 93 MMHG | SYSTOLIC BLOOD PRESSURE: 171 MMHG | HEIGHT: 72 IN | TEMPERATURE: 97.2 F | HEART RATE: 74 BPM | RESPIRATION RATE: 16 BRPM | BODY MASS INDEX: 30.48 KG/M2

## 2022-03-29 VITALS
RESPIRATION RATE: 2 BRPM | DIASTOLIC BLOOD PRESSURE: 90 MMHG | SYSTOLIC BLOOD PRESSURE: 155 MMHG | OXYGEN SATURATION: 91 %

## 2022-03-29 PROCEDURE — 2500000003 HC RX 250 WO HCPCS: Performed by: NURSE ANESTHETIST, CERTIFIED REGISTERED

## 2022-03-29 PROCEDURE — 2580000003 HC RX 258: Performed by: OTOLARYNGOLOGY

## 2022-03-29 PROCEDURE — A4217 STERILE WATER/SALINE, 500 ML: HCPCS | Performed by: OTOLARYNGOLOGY

## 2022-03-29 PROCEDURE — 3700000001 HC ADD 15 MINUTES (ANESTHESIA): Performed by: OTOLARYNGOLOGY

## 2022-03-29 PROCEDURE — 7100000000 HC PACU RECOVERY - FIRST 15 MIN: Performed by: OTOLARYNGOLOGY

## 2022-03-29 PROCEDURE — 3600000012 HC SURGERY LEVEL 2 ADDTL 15MIN: Performed by: OTOLARYNGOLOGY

## 2022-03-29 PROCEDURE — 7100000010 HC PHASE II RECOVERY - FIRST 15 MIN: Performed by: OTOLARYNGOLOGY

## 2022-03-29 PROCEDURE — 3600000002 HC SURGERY LEVEL 2 BASE: Performed by: OTOLARYNGOLOGY

## 2022-03-29 PROCEDURE — 6360000002 HC RX W HCPCS: Performed by: NURSE ANESTHETIST, CERTIFIED REGISTERED

## 2022-03-29 PROCEDURE — 3700000000 HC ANESTHESIA ATTENDED CARE: Performed by: OTOLARYNGOLOGY

## 2022-03-29 PROCEDURE — 6370000000 HC RX 637 (ALT 250 FOR IP): Performed by: OTOLARYNGOLOGY

## 2022-03-29 PROCEDURE — 69706 NPS SURG DILAT EUST TUBE BI: CPT | Performed by: OTOLARYNGOLOGY

## 2022-03-29 PROCEDURE — 2500000003 HC RX 250 WO HCPCS

## 2022-03-29 PROCEDURE — 7100000001 HC PACU RECOVERY - ADDTL 15 MIN: Performed by: OTOLARYNGOLOGY

## 2022-03-29 PROCEDURE — 69424 REMOVE VENTILATING TUBE: CPT | Performed by: OTOLARYNGOLOGY

## 2022-03-29 PROCEDURE — 2580000003 HC RX 258: Performed by: NURSE ANESTHETIST, CERTIFIED REGISTERED

## 2022-03-29 PROCEDURE — 2500000003 HC RX 250 WO HCPCS: Performed by: OTOLARYNGOLOGY

## 2022-03-29 PROCEDURE — 7100000011 HC PHASE II RECOVERY - ADDTL 15 MIN: Performed by: OTOLARYNGOLOGY

## 2022-03-29 PROCEDURE — C1726 CATH, BAL DIL, NON-VASCULAR: HCPCS | Performed by: OTOLARYNGOLOGY

## 2022-03-29 PROCEDURE — 2709999900 HC NON-CHARGEABLE SUPPLY: Performed by: OTOLARYNGOLOGY

## 2022-03-29 RX ORDER — SODIUM CHLORIDE 0.9 % (FLUSH) 0.9 %
5-40 SYRINGE (ML) INJECTION PRN
Status: DISCONTINUED | OUTPATIENT
Start: 2022-03-29 | End: 2022-03-29 | Stop reason: HOSPADM

## 2022-03-29 RX ORDER — SODIUM CHLORIDE 0.9 % (FLUSH) 0.9 %
5-40 SYRINGE (ML) INJECTION EVERY 12 HOURS SCHEDULED
Status: DISCONTINUED | OUTPATIENT
Start: 2022-03-29 | End: 2022-03-29 | Stop reason: HOSPADM

## 2022-03-29 RX ORDER — PROPOFOL 10 MG/ML
INJECTION, EMULSION INTRAVENOUS PRN
Status: DISCONTINUED | OUTPATIENT
Start: 2022-03-29 | End: 2022-03-29 | Stop reason: SDUPTHER

## 2022-03-29 RX ORDER — MAGNESIUM HYDROXIDE 1200 MG/15ML
LIQUID ORAL CONTINUOUS PRN
Status: COMPLETED | OUTPATIENT
Start: 2022-03-29 | End: 2022-03-29

## 2022-03-29 RX ORDER — LIDOCAINE HYDROCHLORIDE AND EPINEPHRINE 10; 10 MG/ML; UG/ML
INJECTION, SOLUTION INFILTRATION; PERINEURAL
Status: COMPLETED | OUTPATIENT
Start: 2022-03-29 | End: 2022-03-29

## 2022-03-29 RX ORDER — SUCCINYLCHOLINE/SOD CL,ISO/PF 200MG/10ML
SYRINGE (ML) INTRAVENOUS PRN
Status: DISCONTINUED | OUTPATIENT
Start: 2022-03-29 | End: 2022-03-29 | Stop reason: SDUPTHER

## 2022-03-29 RX ORDER — KETAMINE HCL IN NACL, ISO-OSM 100MG/10ML
SYRINGE (ML) INJECTION PRN
Status: DISCONTINUED | OUTPATIENT
Start: 2022-03-29 | End: 2022-03-29 | Stop reason: SDUPTHER

## 2022-03-29 RX ORDER — FENTANYL CITRATE 50 UG/ML
25 INJECTION, SOLUTION INTRAMUSCULAR; INTRAVENOUS EVERY 5 MIN PRN
Status: DISCONTINUED | OUTPATIENT
Start: 2022-03-29 | End: 2022-03-29 | Stop reason: HOSPADM

## 2022-03-29 RX ORDER — ONDANSETRON 2 MG/ML
INJECTION INTRAMUSCULAR; INTRAVENOUS PRN
Status: DISCONTINUED | OUTPATIENT
Start: 2022-03-29 | End: 2022-03-29 | Stop reason: SDUPTHER

## 2022-03-29 RX ORDER — SODIUM CHLORIDE 9 MG/ML
INJECTION, SOLUTION INTRAVENOUS PRN
Status: DISCONTINUED | OUTPATIENT
Start: 2022-03-29 | End: 2022-03-29 | Stop reason: HOSPADM

## 2022-03-29 RX ORDER — OFLOXACIN 3 MG/ML
SOLUTION/ DROPS OPHTHALMIC
Status: COMPLETED | OUTPATIENT
Start: 2022-03-29 | End: 2022-03-29

## 2022-03-29 RX ORDER — FENTANYL CITRATE 50 UG/ML
INJECTION, SOLUTION INTRAMUSCULAR; INTRAVENOUS PRN
Status: DISCONTINUED | OUTPATIENT
Start: 2022-03-29 | End: 2022-03-29 | Stop reason: SDUPTHER

## 2022-03-29 RX ORDER — SODIUM CHLORIDE 9 MG/ML
INJECTION, SOLUTION INTRAVENOUS CONTINUOUS PRN
Status: DISCONTINUED | OUTPATIENT
Start: 2022-03-29 | End: 2022-03-29 | Stop reason: SDUPTHER

## 2022-03-29 RX ORDER — SODIUM CHLORIDE 9 MG/ML
25 INJECTION, SOLUTION INTRAVENOUS PRN
Status: DISCONTINUED | OUTPATIENT
Start: 2022-03-29 | End: 2022-03-29 | Stop reason: HOSPADM

## 2022-03-29 RX ORDER — ONDANSETRON 2 MG/ML
4 INJECTION INTRAMUSCULAR; INTRAVENOUS
Status: DISCONTINUED | OUTPATIENT
Start: 2022-03-29 | End: 2022-03-29 | Stop reason: HOSPADM

## 2022-03-29 RX ORDER — MIDAZOLAM HYDROCHLORIDE 1 MG/ML
INJECTION INTRAMUSCULAR; INTRAVENOUS PRN
Status: DISCONTINUED | OUTPATIENT
Start: 2022-03-29 | End: 2022-03-29 | Stop reason: SDUPTHER

## 2022-03-29 RX ORDER — ROCURONIUM BROMIDE 10 MG/ML
INJECTION, SOLUTION INTRAVENOUS PRN
Status: DISCONTINUED | OUTPATIENT
Start: 2022-03-29 | End: 2022-03-29 | Stop reason: SDUPTHER

## 2022-03-29 RX ORDER — LIDOCAINE HYDROCHLORIDE 20 MG/ML
INJECTION, SOLUTION EPIDURAL; INFILTRATION; INTRACAUDAL; PERINEURAL PRN
Status: DISCONTINUED | OUTPATIENT
Start: 2022-03-29 | End: 2022-03-29 | Stop reason: SDUPTHER

## 2022-03-29 RX ORDER — DEXAMETHASONE SODIUM PHOSPHATE 4 MG/ML
INJECTION, SOLUTION INTRA-ARTICULAR; INTRALESIONAL; INTRAMUSCULAR; INTRAVENOUS; SOFT TISSUE PRN
Status: DISCONTINUED | OUTPATIENT
Start: 2022-03-29 | End: 2022-03-29 | Stop reason: SDUPTHER

## 2022-03-29 RX ADMIN — SUGAMMADEX 400 MG: 100 INJECTION, SOLUTION INTRAVENOUS at 13:15

## 2022-03-29 RX ADMIN — Medication 20 MG: at 12:43

## 2022-03-29 RX ADMIN — Medication 10 MG: at 12:50

## 2022-03-29 RX ADMIN — DEXAMETHASONE SODIUM PHOSPHATE 10 MG: 4 INJECTION, SOLUTION INTRAMUSCULAR; INTRAVENOUS at 12:51

## 2022-03-29 RX ADMIN — PROPOFOL 50 MG: 10 INJECTION, EMULSION INTRAVENOUS at 12:56

## 2022-03-29 RX ADMIN — MIDAZOLAM 2 MG: 1 INJECTION INTRAMUSCULAR; INTRAVENOUS at 12:38

## 2022-03-29 RX ADMIN — ROCURONIUM BROMIDE 5 MG: 10 SOLUTION INTRAVENOUS at 12:45

## 2022-03-29 RX ADMIN — FENTANYL CITRATE 50 MCG: 50 INJECTION INTRAMUSCULAR; INTRAVENOUS at 12:53

## 2022-03-29 RX ADMIN — FENTANYL CITRATE 50 MCG: 50 INJECTION INTRAMUSCULAR; INTRAVENOUS at 12:43

## 2022-03-29 RX ADMIN — PROPOFOL 200 MG: 10 INJECTION, EMULSION INTRAVENOUS at 12:45

## 2022-03-29 RX ADMIN — ROCURONIUM BROMIDE 35 MG: 10 SOLUTION INTRAVENOUS at 12:51

## 2022-03-29 RX ADMIN — SODIUM CHLORIDE: 9 INJECTION, SOLUTION INTRAVENOUS at 10:11

## 2022-03-29 RX ADMIN — LIDOCAINE HYDROCHLORIDE 100 MG: 20 INJECTION, SOLUTION EPIDURAL; INFILTRATION; INTRACAUDAL; PERINEURAL at 12:43

## 2022-03-29 RX ADMIN — Medication 160 MG: at 12:46

## 2022-03-29 RX ADMIN — ONDANSETRON 4 MG: 2 INJECTION INTRAMUSCULAR; INTRAVENOUS at 12:51

## 2022-03-29 ASSESSMENT — PULMONARY FUNCTION TESTS
PIF_VALUE: 1
PIF_VALUE: 17
PIF_VALUE: 12
PIF_VALUE: 15
PIF_VALUE: 15
PIF_VALUE: 1
PIF_VALUE: 16
PIF_VALUE: 15
PIF_VALUE: 1
PIF_VALUE: 2
PIF_VALUE: 15
PIF_VALUE: 15
PIF_VALUE: 4
PIF_VALUE: 10
PIF_VALUE: 6
PIF_VALUE: 16
PIF_VALUE: 16
PIF_VALUE: 15
PIF_VALUE: 13
PIF_VALUE: 15
PIF_VALUE: 15
PIF_VALUE: 22
PIF_VALUE: 0
PIF_VALUE: 3
PIF_VALUE: 16
PIF_VALUE: 10
PIF_VALUE: 10
PIF_VALUE: 15
PIF_VALUE: 14
PIF_VALUE: 23
PIF_VALUE: 1
PIF_VALUE: 2
PIF_VALUE: 15
PIF_VALUE: 15
PIF_VALUE: 1
PIF_VALUE: 16
PIF_VALUE: 15
PIF_VALUE: 10
PIF_VALUE: 23
PIF_VALUE: 17
PIF_VALUE: 15
PIF_VALUE: 15
PIF_VALUE: 1
PIF_VALUE: 3
PIF_VALUE: 1
PIF_VALUE: 1
PIF_VALUE: 16
PIF_VALUE: 15
PIF_VALUE: 2
PIF_VALUE: 15
PIF_VALUE: 15
PIF_VALUE: 18

## 2022-03-29 ASSESSMENT — PAIN SCALES - GENERAL
PAINLEVEL_OUTOF10: 0

## 2022-03-29 ASSESSMENT — ENCOUNTER SYMPTOMS: SHORTNESS OF BREATH: 0

## 2022-03-29 ASSESSMENT — PAIN - FUNCTIONAL ASSESSMENT: PAIN_FUNCTIONAL_ASSESSMENT: 0-10

## 2022-03-29 NOTE — ANESTHESIA PRE PROCEDURE
Department of Anesthesiology  Preprocedure Note       Name:  Anne Eduardo   Age:  75 y.o.  :  1955                                          MRN:  [id number]         Date:  3/29/2022      Surgeon: Coretta Rodriguez):  Luly Zapata MD    Procedure: Procedure(s):  BILATERAL EUSTACHIAN TUBE DILATION RIGHT EAR EXAM WITH REMOVAL OF RETAINED EAR TUBE    Medications prior to admission:   Prior to Admission medications    Medication Sig Start Date End Date Taking? Authorizing Provider   hydroCHLOROthiazide (HYDRODIURIL) 25 MG tablet TAKE 1 TABLET BY MOUTH ONE TIME A DAY **MUST MAKE APPOINTMENT FOR FURTHER FILLS** 3/9/22  Yes Jo Homans, APRN - CNP   atorvastatin (LIPITOR) 40 MG tablet TAKE 1 TABLET BY MOUTH ONE TIME A DAY 22  Yes Chirag Spruce, DO   zolpidem (AMBIEN) 10 MG tablet TAKE ONE-HALF TABLET BY MOUTH EVERY EVENING AS NEEDED FOR SLEEP 22 Yes Chirag Spruce, DO   terbinafine (LAMISIL) 250 MG tablet Take 1 tablet by mouth daily 22 Yes Chirag Spruce, DO   folic acid (FOLVITE) 1 MG tablet TAKE 1 TABLET BY MOUTH ONE TIME A DAY 1/10/22  Yes Chirag Spruce, DO   clomiPRAMINE (ANAFRANIL) 25 MG capsule TAKE THREE CAPSULES BY MOUTH EVERY EVENING  Patient taking differently: Take 50 mg by mouth nightly  21  Yes Chirag Spruce, DO   triamcinolone (KENALOG) 0.1 % cream Apply topically 2 times daily Apply topically 2 times daily. 20  Yes Amparo [facility], APRN - CNP   sildenafil (REVATIO) 20 MG tablet Take 1-5 tablets by mouth as needed 19  Yes Historical Provider, MD   thyroid (ARMOUR) 130 MG tablet Take 130 mg by mouth daily   Yes Historical Provider, MD   Testosterone 20 % CREA by Does not apply route. .   Yes Historical Provider, MD   ibuprofen (ADVIL;MOTRIN) 200 MG tablet Take 200 mg by mouth daily Indications: 800 MG TWICE DAILY    Yes Historical Provider, MD   Cholecalciferol (VITAMIN D3) 5000 units TABS Take 1 tablet by mouth every other day    Yes Historical Provider, MD   Nutritional Supplements (DHEA PO) Take 35 mg by mouth daily   Yes Historical Provider, MD   metoprolol tartrate (LOPRESSOR) 50 MG tablet TAKE 1 TABLET BY MOUTH 3 TIMES A DAY 3/17/22   Bonita Best DO   clobetasol (TEMOVATE) 0.05 % cream Apply topically 2 times daily Apply topically 2 times daily. 6/14/21   Bonita Best,        Current medications:    Current Facility-Administered Medications   Medication Dose Route Frequency Provider Last Rate Last Admin    sodium chloride flush 0.9 % injection 5-40 mL  5-40 mL IntraVENous 2 times per day Camila Bravo MD        sodium chloride flush 0.9 % injection 5-40 mL  5-40 mL IntraVENous PRN Camila Bravo MD        0.9 % sodium chloride infusion  25 mL IntraVENous PRN Camila Bravo MD           Allergies:  No Known Allergies    Problem List:    Patient Active Problem List   Diagnosis Code    Hypertension I10    Hyperlipidemia E78.5    Dermatomyositis ([facility] [address] M33.90    Insomnia G47.00    Anxiety F41.9    Dysfunction of eustachian tube H69.80    History of lumbar laminectomy Z98.890    Abnormal EKG R94.31    Coronary artery disease involving native coronary artery of native heart without angina pectoris I25.10    H/O angiography Z92.89    Right bundle branch block left axis -bifascicular block I45.10    Homocysteinemia R79.83    Frequent PVCs I49.3    Testosterone deficiency E34.9       Past Medical History:        Diagnosis Date    Anxiety     BPH (benign prostatic hyperplasia) 2016    Chronic back pain     Colon polyps 2005    Coronary atherosclerosis 05/10/2019    cath, LAD had 20-25% narrowing but required no intervention other than maximizing medical tx.  Dermatomyositis ([facility] [address]     ED (erectile dysfunction)     History of skin cancer     Homocysteinemia 1/7/2021    Homocystine 20 on 1/7/21.     Hyperlipidemia     Hypertension 2008    Hypothyroidism     Insomnia     Osteoarthritis     Right bundle branch block left axis -bifascicular block 2019    -Right bundle branch block with .  Spinal cord tumor     Testosterone deficiency     Wears glasses        Past Surgical History:        Procedure Laterality Date    CARDIAC CATHETERIZATION  2019    COLONOSCOPY  2012    Dr. Joann Hernandez COLONOSCOPY  2005    Dr. Bita Veliz, adenomatous polyps    COLONOSCOPY  2009    Dr. Asif Quintanilla  2015    Dr. Bita Veliz, recheck 2020    LUMBAR LAMINECTOMY  2008    L1-L2    NASAL SEPTUM SURGERY  1976    NERVE BIOPSY      Spine:  myxopapillary ependymoma     PROSTATE SURGERY N/A 2021    [facility]IFT at [facility]rology [facility] Dr Marcello Moreno.  TYMPANOSTOMY TUBE PLACEMENT Bilateral 2017    UPPER GASTROINTESTINAL ENDOSCOPY  2012    Dr. Bita Veliz       Social History:    Social History     Tobacco Use    Smoking status: Former Smoker     Packs/day: 1.00     Years: 33.00     Pack years: 33.00     Types: Cigarettes     Start date: 1972     Quit date:      Years since quittin.2    Smokeless tobacco: Never Used   Substance Use Topics    Alcohol use: Yes     Alcohol/week: 5.0 standard drinks     Types: 5 Shots of liquor per week     Comment: OCCASIONAL. Counseling given: Not Answered      Vital Signs (Current):   Vitals:    22 1631   Weight: 225 lb (102.1 kg)   Height: 6' (1.829 m)                                              BP Readings from Last 3 Encounters:   22 120/80   22 (!) 167/100   22 122/74       NPO Status: Time of last liquid consumption: 2300                        Time of last solid consumption: 2300                        Date of last liquid consumption: 22                        Date of last solid food consumption: 22    BMI:   Wt Readings from Last 3 Encounters:   22 225 lb (102.1 kg)   22 226 lb (102.5 kg)   22 225 lb (102.1 kg)     Body mass index is 30.52 kg/m².     CBC:   Lab Results   Component Value Date    WBC 4.8 03/24/2022    RBC 5.95 03/24/2022    HGB 18.6 03/24/2022    HCT 53.7 03/24/2022    MCV 90.3 03/24/2022    RDW 15.5 03/24/2022     03/24/2022       CMP:   Lab Results   Component Value Date     03/24/2022    K 3.9 03/24/2022     03/24/2022    CO2 24 03/24/2022    BUN 20 03/24/2022    CREATININE 1.3 03/24/2022    GFRAA >60 03/24/2022    AGRATIO 2.2 03/24/2022    LABGLOM 55 03/24/2022    GLUCOSE 87 03/24/2022    PROT 6.4 03/24/2022    CALCIUM 9.5 03/24/2022    BILITOT 0.7 03/24/2022    ALKPHOS 84 03/24/2022    AST 26 03/24/2022    ALT 34 03/24/2022       POC Tests: No results for input(s): POCGLU, POCNA, POCK, POCCL, POCBUN, POCHEMO, POCHCT in the last 72 hours.     Coags:   Lab Results   Component Value Date    INR 1.10 05/07/2019       HCG (If Applicable): No results found for: PREGTESTUR, PREGSERUM, HCG, HCGQUANT     ABGs: No results found for: PHART, PO2ART, FNM2XFQ, DJI9PYS, BEART, V7IQYOJG     Type & Screen (If Applicable):  No results found for: LABABO, LABRH    Drug/Infectious Status (If Applicable):  No results found for: HIV, HEPCAB    COVID-19 Screening (If Applicable): No results found for: COVID19        Anesthesia Evaluation  Patient summary reviewed no history of anesthetic complications:   Airway: Mallampati: II  TM distance: >3 FB   Neck ROM: full  Mouth opening: > = 3 FB Dental:      Comment: No loose teeth    Pulmonary: breath sounds clear to auscultation      (-) COPD, asthma, shortness of breath, recent URI and sleep apnea                           Cardiovascular:    (+) hypertension:, CAD (20-25% stenosis LAD):, hyperlipidemia    (-) valvular problems/murmurs, past MI, CABG/stent, dysrhythmias,  angina,  CHF and no pulmonary hypertension      Rhythm: regular  Rate: normal                    Neuro/Psych:   (+) neuromuscular disease:, depression/anxiety    (-) seizures, TIA, CVA and headaches           GI/Hepatic/Renal:        (-) GERD, PUD, hepatitis, liver disease, no renal disease and bowel prep       Endo/Other:    (+) hypothyroidism: arthritis:., .    (-) diabetes mellitus               Abdominal:             Vascular: Other Findings:           Anesthesia Plan      general     ASA 2     (He took BP med this morning. Will recheck BP prior to OR. )  Induction: intravenous. MIPS: Postoperative opioids intended and Prophylactic antiemetics administered. Anesthetic plan and risks discussed with patient. Plan discussed with CRNA. This pre-anesthesia assessment may be used as a history and physical.    DOS STAFF ADDENDUM:    Pt seen and examined, chart reviewed (including anesthesia, drug and allergy history). No interval changes to history and physical examination. Anesthetic plan, risks, benefits, alternatives, and personnel involved discussed with patient. Patient verbalized an understanding and agrees to proceed.       Mary Adams MD  March 29, 2022  10:04 AM      Mary Adams MD   3/29/2022

## 2022-03-29 NOTE — ANESTHESIA POSTPROCEDURE EVALUATION
Department of Anesthesiology  Postprocedure Note    Patient: Nat Silva  MRN: [id number]  YOB: 1955  Date of evaluation: 3/29/2022  Time:  2:58 PM     Procedure Summary     Date: 03/29/22 Room / Location: [address] [place] [address]    Anesthesia Start: 9824 Anesthesia Stop: 5908    Procedure: BILATERAL EUSTACHIAN TUBE DILATION RIGHT EAR EXAM WITH REMOVAL OF RETAINED EAR TUBE (Bilateral Ear) Diagnosis:       Dysfunction of Eustachian tube, bilateral      (DYSFUNCTION OF BOTH EUSTACHIAN TUBES)    Surgeons: Yumiko Leonard MD Responsible Provider: Nikolay Griffin MD    Anesthesia Type: general ASA Status: 2          Anesthesia Type: general    Sinai Phase I: Sinai Score: 8    Sinai Phase II:      Last vitals: Reviewed and per EMR flowsheets.        Anesthesia Post Evaluation    Level of consciousness: awake and alert  Airway patency: patent  Nausea & Vomiting: no nausea and no vomiting  Complications: no  Cardiovascular status: hemodynamically stable  Respiratory status: acceptable  Hydration status: stable

## 2022-03-29 NOTE — OP NOTE
[address] [facility] SURGERY  OPERATIVE REPORT    Patient Name: Micheline Larson  YOB: 1955  Medical Record Number:  [id number]  Billing Number:  [id number]  Date of Procedure: [unfilled]  Time: 5016    Pre Operative Diagnoses:   Retained right ear tube  Bilateral eustachian tube dysfunction    Post Operative Diagnoses:    same           Procedure:  Bilateral eustachian tube dilation (86871)   Right ear exam with ear tube removal (34177)       Surgeon: Ian Sauer MD    OR Staff/ Assistant:  Circulator: Vipul Romano RN  Surgical Assistant: Rasheed Kapaida Circulator: Robbie Horne RN  Scrub Person First: Evangelina Lawrence; Cristy Cespedes    Anesthesia:  General anesthesia. Findings:  1) retained right ear tube, thick tympanic membrane, right ear tube removed    Successful bilateral dilation of eustachian tubes    Indications: This is a 75 y.o. male with a history of bilateral eustachian tube dysfunction requiring multiple sets of ear tubes. He is here today for removal of a right retained ear tube and eustachian tube dilation. Risks and benefits discussed with the patient including alternate treatment options, Informed consent was obtained, the patient elected to proceed with the planned procedure. DETAILS OF PROCEDURE(S):   The patient was brought to the operating room placed in supine position on the operating table. He underwent uncomplicated general anesthesia with endotracheal intubation. The head of bed was turned 180 degrees. 2 mL of 1% lidocaine with epinephrine were injected into the inferior turbinates. Afrin pledgets were then placed. Exam was first turned to the right ear. There was a very anteriorly located retained ear tube that was grasped with alligator and removed from the tympanic membrane. There was a bit of granulation tissue associated with it. Next a rigid scope was used to visualize the nasopharynx.

## 2022-03-29 NOTE — H&P
I have reviewed this patient's history and physical.  There have been no significant changes. The patient was counseled at length about the risks of nubia Covid-19 during their perioperative period and any recovery window from their procedure. The patient was made aware that nubia Covid-19  may worsen their prognosis for recovering from their procedure  and lend to a higher morbidity and/or mortality risk. All material risks, benefits, and reasonable alternatives including postponing the procedure were discussed. The patient does wish to proceed with the procedure at this time. The patient understands the risk of ear exam under anesthesia and eustachian tube dilation including ongoing eustachian tube dysfunction. He has agreed to proceed.

## 2022-03-29 NOTE — PROGRESS NOTES
To pacu from OR. Pt awake, denies pain. Pt coughing at intervals. No nasal drainage or drainage from ears noted. IV infusing. Monitor in sinus rhythm.

## 2022-04-03 ENCOUNTER — HOSPITAL ENCOUNTER (EMERGENCY)
Age: 67
Discharge: HOME | End: 2022-04-03
Payer: COMMERCIAL

## 2022-04-03 VITALS
WEIGHT: 223.11 LBS | TEMPERATURE: 98.2 F | OXYGEN SATURATION: 98 % | RESPIRATION RATE: 17 BRPM | SYSTOLIC BLOOD PRESSURE: 164 MMHG | BODY MASS INDEX: 30.26 KG/M2 | HEART RATE: 84 BPM | DIASTOLIC BLOOD PRESSURE: 79 MMHG

## 2022-04-03 DIAGNOSIS — E87.6 HYPOKALEMIA: ICD-10-CM

## 2022-04-03 DIAGNOSIS — K04.7 DENTAL ABSCESS: Primary | ICD-10-CM

## 2022-04-03 LAB
ANION GAP SERPL CALCULATED.3IONS-SCNC: 16 MMOL/L (ref 3–16)
BUN BLDV-MCNC: 17 MG/DL (ref 7–20)
CALCIUM SERPL-MCNC: 9.6 MG/DL (ref 8.3–10.6)
CHLORIDE BLD-SCNC: 94 MMOL/L (ref 99–110)
CO2: 27 MMOL/L (ref 21–32)
CREAT SERPL-MCNC: 1.4 MG/DL (ref 0.8–1.3)
GFR AFRICAN AMERICAN: >60
GFR NON-AFRICAN AMERICAN: 50
GLUCOSE BLD-MCNC: 98 MG/DL (ref 70–99)
MAGNESIUM: 2.2 MG/DL (ref 1.8–2.4)
POTASSIUM REFLEX MAGNESIUM: 3.4 MMOL/L (ref 3.5–5.1)
SODIUM BLD-SCNC: 137 MMOL/L (ref 136–145)

## 2022-04-03 PROCEDURE — 36415 COLL VENOUS BLD VENIPUNCTURE: CPT

## 2022-04-03 PROCEDURE — 99283 EMERGENCY DEPT VISIT LOW MDM: CPT

## 2022-04-03 PROCEDURE — 85025 COMPLETE CBC W/AUTO DIFF WBC: CPT

## 2022-04-03 PROCEDURE — 80048 BASIC METABOLIC PNL TOTAL CA: CPT

## 2022-04-03 PROCEDURE — 6370000000 HC RX 637 (ALT 250 FOR IP): Performed by: PHYSICIAN ASSISTANT

## 2022-04-03 PROCEDURE — 6360000002 HC RX W HCPCS: Performed by: PHYSICIAN ASSISTANT

## 2022-04-03 PROCEDURE — 96372 THER/PROPH/DIAG INJ SC/IM: CPT

## 2022-04-03 PROCEDURE — 83735 ASSAY OF MAGNESIUM: CPT

## 2022-04-03 RX ORDER — POTASSIUM CHLORIDE 20 MEQ/1
20 TABLET, EXTENDED RELEASE ORAL ONCE
Status: COMPLETED | OUTPATIENT
Start: 2022-04-03 | End: 2022-04-03

## 2022-04-03 RX ORDER — KETOROLAC TROMETHAMINE 10 MG/1
10 TABLET, FILM COATED ORAL EVERY 6 HOURS PRN
Qty: 20 TABLET | Refills: 0 | Status: SHIPPED | OUTPATIENT
Start: 2022-04-03 | End: 2022-09-09 | Stop reason: ALTCHOICE

## 2022-04-03 RX ORDER — KETOROLAC TROMETHAMINE 30 MG/ML
30 INJECTION, SOLUTION INTRAMUSCULAR; INTRAVENOUS ONCE
Status: COMPLETED | OUTPATIENT
Start: 2022-04-03 | End: 2022-04-03

## 2022-04-03 RX ADMIN — POTASSIUM CHLORIDE 20 MEQ: 20 TABLET, EXTENDED RELEASE ORAL at 20:25

## 2022-04-03 RX ADMIN — KETOROLAC TROMETHAMINE 30 MG: 30 INJECTION, SOLUTION INTRAMUSCULAR at 18:30

## 2022-04-03 ASSESSMENT — ENCOUNTER SYMPTOMS
DIARRHEA: 0
BACK PAIN: 0
EYE PAIN: 0
VOMITING: 0
EYE REDNESS: 0
ABDOMINAL PAIN: 0
COUGH: 0
CONSTIPATION: 0
NAUSEA: 0
SORE THROAT: 0
SHORTNESS OF BREATH: 0

## 2022-04-03 ASSESSMENT — PAIN SCALES - GENERAL
PAINLEVEL_OUTOF10: 4
PAINLEVEL_OUTOF10: 6

## 2022-04-03 NOTE — ED PROVIDER NOTES
[address] [facility]        Pt Name: Ana Gtz  MRN: [id number]  [place] 1955  Date of evaluation: 4/3/2022  Provider: Danelle Snow PA-C  PCP: Max Arthur DO  Note Started: 6:34 PM EDT      MAGDALENO. I have evaluated this patient. My supervising physician was available for consultation. Triage CHIEF COMPLAINT       Chief Complaint   Patient presents with    Dental Problem     left, abx not working, edema noted         HISTORY OF PRESENT ILLNESS   (Location/Symptom, Timing/Onset, Context/Setting, Quality, Duration, Modifying Factors, Severity)  Note limiting factors. Chief Complaint: Left dental pain    Ana Gtz is a 75 y.o. male who presents to emergency department with complaint of left dental pain. He states that he was seen by a dentist and told he needs a root canal.  He was originally put on amoxicillin for a couple days, however this did not work for him. He did call today and they switch him to Augmentin. He states he has had 1 dose of Augmentin, but he is more concerned about the pain. He does have an appointment with a dentist on Wednesday for root canal.  He denies fever, chills. Nursing Notes were all reviewed and agreed with or any disagreements were addressed in the HPI. REVIEW OF SYSTEMS    (2-9 systems for level 4, 10 or more for level 5)     Review of Systems   Constitutional: Negative for chills and fever. Sweats   HENT: Negative for congestion and sore throat. Eyes: Negative for pain and redness. Respiratory: Negative for cough and shortness of breath. Cardiovascular: Negative for chest pain and leg swelling. Gastrointestinal: Negative for abdominal pain, constipation, diarrhea, nausea and vomiting. Genitourinary: Negative for dysuria and hematuria. Musculoskeletal: Negative for back pain and neck pain. Skin: Negative for rash and wound.    Neurological: Negative for light-headedness and headaches. PAST MEDICAL HISTORY     Past Medical History:   Diagnosis Date    Anxiety     BPH (benign prostatic hyperplasia) 2016    Chronic back pain     Colon polyps 2005    Coronary atherosclerosis 05/10/2019    cath, LAD had 20-25% narrowing but required no intervention other than maximizing medical tx.  Dermatomyositis ([address]     ED (erectile dysfunction)     History of skin cancer     Homocysteinemia 1/7/2021    Homocystine 20 on 1/7/21.  Hyperlipidemia     Hypertension 2008    Hypothyroidism     Insomnia     Osteoarthritis     Right bundle branch block left axis -bifascicular block 03/07/2019    -Right bundle branch block with .  Spinal cord tumor 2011    Testosterone deficiency     Wears glasses        SURGICAL HISTORY     Past Surgical History:   Procedure Laterality Date    CARDIAC CATHETERIZATION  05/07/2019    COLONOSCOPY  09/13/2012    Dr. Mayra Berman COLONOSCOPY  04/16/2005    Dr. Geovany Levine, adenomatous polyps    COLONOSCOPY  06/25/2009    Dr. April Millan  12/30/2015    Dr. Geovany Levine, recheck 2020    INNER EAR SURGERY Bilateral 3/29/2022    BILATERAL EUSTACHIAN TUBE DILATION RIGHT EAR EXAM WITH REMOVAL OF RETAINED EAR TUBE performed by Elmer Trammell MD at [facility]  2008    L1-L2   [address] BIOPSY  2011    Spine:  myxopapillary ependymoma     PROSTATE SURGERY N/A 03/01/2021    UROLIFT at [facility] [place]. Dr Demetrius Crouch.     TYMPANOSTOMY TUBE PLACEMENT Bilateral 2017    UPPER GASTROINTESTINAL ENDOSCOPY  09/13/2012    Dr. Larsen Maxim       Discharge Medication List as of 4/3/2022  8:22 PM      CONTINUE these medications which have NOT CHANGED    Details   metoprolol tartrate (LOPRESSOR) 50 MG tablet TAKE 1 TABLET BY MOUTH 3 TIMES A DAY, Disp-270 tablet, R-1Normal      hydroCHLOROthiazide (HYDRODIURIL) 25 MG tablet TAKE 1 TABLET BY MOUTH ONE TIME A DAY **MUST MAKE APPOINTMENT FOR FURTHER FILLS**, Disp-28 tablet, R-0Normal      atorvastatin (LIPITOR) 40 MG tablet TAKE 1 TABLET BY MOUTH ONE TIME A DAY, Disp-90 tablet, R-0Normal      zolpidem (AMBIEN) 10 MG tablet TAKE ONE-HALF TABLET BY MOUTH EVERY EVENING AS NEEDED FOR SLEEP, Disp-30 tablet, R-5Normal      terbinafine (LAMISIL) 250 MG tablet Take 1 tablet by mouth daily, Disp-84 tablet, [id number]      folic acid (FOLVITE) 1 MG tablet TAKE 1 TABLET BY MOUTH ONE TIME A DAY, Disp-90 tablet, R-0Normal      clomiPRAMINE (ANAFRANIL) 25 MG capsule TAKE THREE CAPSULES BY MOUTH EVERY EVENING, Disp-270 capsule, R-1Normal      clobetasol (TEMOVATE) 0.05 % cream Apply topically 2 times daily Apply topically 2 times daily. , Topical, 2 TIMES DAILY Starting Mon 6/14/2021, Disp-120 g, R-2, Normal      triamcinolone (KENALOG) 0.1 % cream Apply topically 2 times daily Apply topically 2 times daily. , Topical, 2 TIMES DAILY Starting Mon 4/13/2020, Disp-80 g, R-2, Normal      sildenafil (REVATIO) 20 MG tablet Take 1-5 tablets by mouth as needed, R-3Historical Med      thyroid (ARMOUR) 130 MG tablet Take 130 mg by mouth dailyHistorical Med      Testosterone 20 % CREA by Does not apply route. Hilton Flores Historical Med      ibuprofen (ADVIL;MOTRIN) 200 MG tablet Take 200 mg by mouth daily Indications: 800 MG TWICE DAILY Historical Med      Cholecalciferol (VITAMIN D3) 5000 units TABS Take 1 tablet by mouth every other day Historical Med      Nutritional Supplements (DHEA PO) Take 35 mg by mouth dailyHistorical Med             ALLERGIES     Patient has no known allergies.     FAMILYHISTORY       Family History   Problem Relation Age of Onset    Diabetes Mother     Breast Cancer Mother     Cancer Mother         bladder    Coronary Art Dis Father     Colon Cancer Father     Heart Surgery Brother 54        CABG    Coronary Art Dis Brother 54        Stent @70    High Cholesterol Brother     No Known Problems Brother     Depression Daughter  Anxiety Disorder Daughter     Eczema Daughter     Depression Son    [facility] Anxiety Disorder Son     Asthma Son         SOCIAL HISTORY       Social History     Socioeconomic History    Marital status:      Spouse name: Not on file    Number of children: 3    Years of education: Not on file    Highest education level: Not on file   Occupational History    Occupation: Surgical asst     Employer: [employer]     Comment: 24 hr//wk   Tobacco Use    Smoking status: Former Smoker     Packs/day: 1.00     Years: 33.00     Pack years: 33.00     Types: Cigarettes     Start date: 1972     Quit date:      Years since quittin.2    Smokeless tobacco: Never Used   Vaping Use    Vaping Use: Never used   Substance and Sexual Activity    Alcohol use: Yes     Alcohol/week: 5.0 standard drinks     Types: 5 Shots of liquor per week     Comment: OCCASIONAL.  Drug use: Never    Sexual activity: Yes     Partners: Female   Other Topics Concern    Not on file   Social History Narrative    Swim weekly 1 hr, bike 1/2 hr 2x/wk, weights 1x/wk x 45 min. Walking 3 mi 2x/wk. 12/3/20. Biking 2-3x/wk for 8 mi. [employer] weights weekly for 1 hr. Then walking when able. 21. Stationary bike 30 2x/wk. Walking 1 mi 3x/wk if not too cold. Weights 2x/wk 30 min. 22.3/24/22. Social Determinants of Health     Financial Resource Strain:     Difficulty of Paying Living Expenses: Not on file   Food Insecurity:     Worried About Running Out of Food in the Last Year: Not on file    Renetta of Food in the Last Year: Not on file   Transportation Needs:     Lack of Transportation (Medical): Not on file    Lack of Transportation (Non-Medical):  Not on file   Physical Activity:     Days of Exercise per Week: Not on file    Minutes of Exercise per Session: Not on file   Stress:     Feeling of Stress : Not on file   Social Connections:     Frequency of Communication with Friends and Family: Not on file    Frequency of Social Gatherings with Friends and Family: Not on file    Attends Judaism Services: Not on file    Active Member of Clubs or Organizations: Not on file    Attends Club or Organization Meetings: Not on file    Marital Status: Not on file   Intimate Partner Violence:     Fear of Current or Ex-Partner: Not on file    Emotionally Abused: Not on file    Physically Abused: Not on file    Sexually Abused: Not on file   Housing Stability:     Unable to Pay for Housing in the Last Year: Not on file    Number of [place] in the Last Year: Not on file    Unstable Housing in the Last Year: Not on file       SCREENINGS    [place] Coma Scale  Eye Opening: Spontaneous  Best Verbal Response: Oriented  Best Motor Response: Obeys commands  Martha Coma Scale Score: 15        PHYSICAL EXAM    (up to 7 for level 4, 8 or more for level 5)     ED Triage Vitals [04/03/22 1626]   BP Temp Temp Source Pulse Resp SpO2 Height Weight   (!) 189/115 98.4 °F (36.9 °C) Temporal 88 18 99 % -- --       Physical Exam  Constitutional:       General: He is not in acute distress. Appearance: Normal appearance. He is not ill-appearing, toxic-appearing or diaphoretic. HENT:      Head: Normocephalic and atraumatic. Right Ear: Tympanic membrane, ear canal and external ear normal.      Left Ear: Tympanic membrane, ear canal and external ear normal.      Nose: Nose normal.      Mouth/Throat:      Mouth: Mucous membranes are moist.      Pharynx: Oropharynx is clear. No oropharyngeal exudate or posterior oropharyngeal erythema. Comments: Patient with left-sided edema surrounding the tooth, minimal erythema,  pain with palpation    Eyes:      General:         Right eye: No discharge. Left eye: No discharge. Pulmonary:      Effort: Pulmonary effort is normal. No respiratory distress. Musculoskeletal:         General: Normal range of motion. Cervical back: Normal range of motion.    Skin:     General: Skin is warm and dry. Neurological:      General: No focal deficit present. Mental Status: He is alert and oriented to person, place, and time. Psychiatric:         Mood and Affect: Mood normal.         Behavior: Behavior normal.         DIAGNOSTIC RESULTS   LABS:    Labs Reviewed   CBC WITH AUTO DIFFERENTIAL - Abnormal; Notable for the following components:       Result Value    RBC 6.63 (*)     Hemoglobin 20.3 (*)     Hematocrit 59.1 (*)     RDW 15.7 (*)     All other components within normal limits    Narrative:     Marge Jacobs tel. [phone],  Hematology results called to and read back by Alayna Abdi RN, 04/03/2022  19:10, by [facility]   BASIC METABOLIC PANEL W/ REFLEX TO MG FOR LOW K - Abnormal; Notable for the following components:    Potassium reflex Magnesium 3.4 (*)     Chloride 94 (*)     CREATININE 1.4 (*)     GFR Non- 50 (*)     All other components within normal limits   MAGNESIUM   BLOOD SMEAR REVIEW       When ordered, only abnormal lab results are displayed. All other labs were within normal range or not returned as of this dictation. EKG: When ordered, EKG's are interpreted by the Emergency Department Physician in the absence of a cardiologist.  Please see their note for interpretation of EKG. RADIOLOGY:   Non-plain film images such as CT, Ultrasound and MRI are read by the radiologist. Plain radiographic images are visualized andpreliminarily interpreted by the  ED Provider with the below findings:        Interpretation perthe Radiologist below, if available at the time of this note:    No orders to display     No results found.       PROCEDURES   Unless otherwise noted below, none     Procedures    CRITICAL CARE TIME   N/A    CONSULTS:  None      EMERGENCY DEPARTMENT COURSE and DIFFERENTIAL DIAGNOSIS/MDM:   Vitals:    Vitals:    04/03/22 1626 04/03/22 2030   BP: (!) 189/115 (!) 164/79   Pulse: 88 84   Resp: 18 17   Temp: 98.4 °F (36.9 °C) 98.2 °F (36.8 °C)   TempSrc: Temporal Oral   SpO2: 99% 98%   Weight:  223 lb 1.7 oz (101.2 kg)       Patient was given thefollowing medications:  Medications   ketorolac (TORADOL) injection 30 mg (30 mg IntraMUSCular Given 4/3/22 1830)   potassium chloride (KLOR-CON M) extended release tablet 20 mEq (20 mEq Oral Given 4/3/22 2025)           This is a 69-year-old male who presents emergency department with left-sided pain from a tooth abscess. He states that he does have a root canal appointment for this upcoming Wednesday. He was previously on amoxicillin, however this was not improving his symptoms. His dentist did switch him to Augmentin, and he has only taken 1 dose of this earlier today. Vitals upon arrival show that he is afebrile, hypertensive and otherwise within normal limits. I did perform blood work on patient as he is concerned about increased infection, his only abnormality was an elevated hemoglobin but patient does use gel testosterone which would explain this. He was given a Toradol injection here in the emergency department and he states that this significantly improved his pain. At this time I informed patient that he should continue taking the Augmentin and follow-up with dentist as soon as possible. I did give him a prescription for p.o. Toradol and informed him to return to the emergency department if any new worsening or more concerning symptoms present. Patient was agreeable to this plan. He was discharged in stable condition. FINAL IMPRESSION      1. Dental abscess    2.  Hypokalemia          DISPOSITION/PLAN   DISPOSITION Decision To Discharge 04/03/2022 08:18:59 PM      PATIENT REFERREDTO:  Dentist  Keep follow-up with dentist in the next 2 to 3 days        [facility] Emergency Department  [address] [address]  [phone]  Go to   As needed, If symptoms worsen      DISCHARGE MEDICATIONS:  Discharge Medication List as of 4/3/2022  8:22 PM      START taking these medications    Details   ketorolac (TORADOL) 10 MG tablet Take 1 tablet by mouth every 6 hours as needed for Pain, Disp-20 tablet, R-0Print             DISCONTINUED MEDICATIONS:  Discharge Medication List as of 4/3/2022  8:22 PM                 (Please note that portions ofthis note were completed with a voice recognition program.  Efforts were made to edit the dictations but occasionally words are mis-transcribed.)    Cornell Lowe PA-C (electronically signed)             Cornell Lowe PA-C  04/04/22 0941 Yes

## 2022-04-04 LAB
BASOPHILS ABSOLUTE: 0.1 K/UL (ref 0–0.2)
BASOPHILS RELATIVE PERCENT: 0.8 %
EOSINOPHILS ABSOLUTE: 0.1 K/UL (ref 0–0.6)
EOSINOPHILS RELATIVE PERCENT: 0.9 %
HCT VFR BLD CALC: 59.1 % (ref 40.5–52.5)
HEMATOLOGY PATH CONSULT: NORMAL
HEMATOLOGY PATH CONSULT: YES
HEMOGLOBIN: 20.3 G/DL (ref 13.5–17.5)
LYMPHOCYTES ABSOLUTE: 1.4 K/UL (ref 1–5.1)
LYMPHOCYTES RELATIVE PERCENT: 17.5 %
MCH RBC QN AUTO: 30.6 PG (ref 26–34)
MCHC RBC AUTO-ENTMCNC: 34.3 G/DL (ref 31–36)
MCV RBC AUTO: 89.1 FL (ref 80–100)
MONOCYTES ABSOLUTE: 0.9 K/UL (ref 0–1.3)
MONOCYTES RELATIVE PERCENT: 11.3 %
NEUTROPHILS ABSOLUTE: 5.7 K/UL (ref 1.7–7.7)
NEUTROPHILS RELATIVE PERCENT: 69.5 %
PDW BLD-RTO: 15.7 % (ref 12.4–15.4)
PLATELET # BLD: 222 K/UL (ref 135–450)
PMV BLD AUTO: 7.2 FL (ref 5–10.5)
RBC # BLD: 6.63 M/UL (ref 4.2–5.9)
WBC # BLD: 8.2 K/UL (ref 4–11)

## 2022-04-08 RX ORDER — HYDROCHLOROTHIAZIDE 25 MG/1
TABLET ORAL
Qty: 28 TABLET | Refills: 0 | Status: SHIPPED | OUTPATIENT
Start: 2022-04-08 | End: 2022-05-09

## 2022-04-14 ENCOUNTER — OFFICE VISIT (OUTPATIENT)
Dept: CARDIOLOGY CLINIC | Age: 67
End: 2022-04-14
Payer: COMMERCIAL

## 2022-04-14 VITALS
DIASTOLIC BLOOD PRESSURE: 90 MMHG | WEIGHT: 219 LBS | HEIGHT: 72 IN | HEART RATE: 78 BPM | BODY MASS INDEX: 29.66 KG/M2 | SYSTOLIC BLOOD PRESSURE: 150 MMHG

## 2022-04-14 DIAGNOSIS — I10 PRIMARY HYPERTENSION: Primary | ICD-10-CM

## 2022-04-14 DIAGNOSIS — E78.2 MIXED HYPERLIPIDEMIA: ICD-10-CM

## 2022-04-14 DIAGNOSIS — R06.02 SOB (SHORTNESS OF BREATH): ICD-10-CM

## 2022-04-14 PROCEDURE — 99214 OFFICE O/P EST MOD 30 MIN: CPT | Performed by: INTERNAL MEDICINE

## 2022-04-14 RX ORDER — TERBINAFINE HYDROCHLORIDE 250 MG/1
250 TABLET ORAL DAILY
COMMUNITY
End: 2022-09-09 | Stop reason: ALTCHOICE

## 2022-04-14 NOTE — PROGRESS NOTES
[facility]   Dr Fernando Cole. Mason Alfaro MD, [address] [facility]    Outpatient Follow Up Note    4/14/2022,3:14 PM  Subjective:   CHIEF COMPLAINT / HPI:  Follow Up secondary to hypertension and chest pains     Dhruv Palacios is 75 y.o. male who presents today for a routine follow up. Here today for cardiac evaluation and states that he has been doing very well. Denies any chest pains currently but has noted some place that his blood pressure was a bit high. Did have recent ear surgery for eustachian tube abnormalities and blood pressure was running high but did see his PCP and was 120/80. Today I am getting it at 158/84. Denies any chest pains or shortness of breath. I reviewed his angiogram from 2 years ago he did have moderate plaque in the LAD but did not require any intervention. He is doing well generally and otherwise. He is reduced work down to 3 days/week. Does not drink much fluid because he still works as a surgical assistant and sometimes cases last 4 to 5 hours so he will have to go and urinate. CAD cath 2019 with 20 to 30% mid LAD  Hypertension  Hyperlipidemia LDL 88 on current therapy  Hemoconcentration with H&H of 20.3 and 59. Thinks that it may be related to him being relatively dry  CKD with creatinine 1.4  past Medical History:    Past Medical History:   Diagnosis Date    Anxiety     BPH (benign prostatic hyperplasia) 2016    Chronic back pain     Colon polyps 2005    Coronary atherosclerosis 05/10/2019    cath, LAD had 20-25% narrowing but required no intervention other than maximizing medical tx.  Dermatomyositis ([facility] [address]     ED (erectile dysfunction)     History of skin cancer     Homocysteinemia 1/7/2021    Homocystine 20 on 1/7/21.  Hyperlipidemia     Hypertension 2008    Hypothyroidism     Insomnia     Osteoarthritis     Right bundle branch block left axis -bifascicular block 03/07/2019    -Right bundle branch block with .     Spinal cord tumor     Testosterone deficiency     Wears glasses      Past Surgical History  Past Surgical History:   Procedure Laterality Date    CARDIAC CATHETERIZATION  2019    COLONOSCOPY  2012    Dr. Suzanna Nance COLONOSCOPY  2005    Dr. Cherri Batista, adenomatous polyps    COLONOSCOPY  2009    Dr. Keny Lu  2015    Dr. Cherri Batista, recheck 2020    INNER EAR SURGERY Bilateral 3/29/2022    BILATERAL EUSTACHIAN TUBE DILATION RIGHT EAR EXAM WITH REMOVAL OF RETAINED EAR TUBE performed by Vera Temple MD at [facility]      L1-L2   [address] BIOPSY      Spine:  myxopapillary ependymoma     PROSTATE SURGERY N/A 2021    UROLIFT at urology [facility]. Dr Katerin Arriaga.  TYMPANOSTOMY TUBE PLACEMENT Bilateral 2017    UPPER GASTROINTESTINAL ENDOSCOPY  2012    Dr. Cherri Batista     Social History:       Social History     Tobacco Use   Smoking Status Former Smoker    Packs/day: 1.00    Years: 33.00    Pack years: 33.00    Types: Cigarettes    Start date: 1972   [employer] Quit date:     Years since quittin.2   Smokeless Tobacco Never Used     Current Medications:  Prior to Visit Medications    Medication Sig Taking?  Authorizing Provider   terbinafine (LAMISIL) 250 MG tablet Take 250 mg by mouth daily Yes Historical Provider, MD   hydroCHLOROthiazide (HYDRODIURIL) 25 MG tablet TAKE 1 TABLET BY MOUTH ONE TIME A DAY **NEEDS APPOINTMENT FOR DURTHER REFILLS** Yes Concepción Olvera MD   ketorolac (TORADOL) 10 MG tablet Take 1 tablet by mouth every 6 hours as needed for Pain Yes Doroteo Griffiths PA-C   metoprolol tartrate (LOPRESSOR) 50 MG tablet TAKE 1 TABLET BY MOUTH 3 TIMES A DAY Yes Pedro Dias DO   atorvastatin (LIPITOR) 40 MG tablet TAKE 1 TABLET BY MOUTH ONE TIME A DAY Yes Pedro Dias DO   zolpidem (AMBIEN) 10 MG tablet TAKE ONE-HALF TABLET BY MOUTH EVERY EVENING AS NEEDED FOR SLEEP Yes Shoshana Be,  folic acid (FOLVITE) 1 MG tablet TAKE 1 TABLET BY MOUTH ONE TIME A DAY Yes Pedro Dias, DO   clomiPRAMINE (ANAFRANIL) 25 MG capsule TAKE THREE CAPSULES BY MOUTH EVERY EVENING  Patient taking differently: Take 50 mg by mouth nightly  Yes Miguelangel Evans,    clobetasol (TEMOVATE) 0.05 % cream Apply topically 2 times daily Apply topically 2 times daily. Yes Miguelangel Evans,    triamcinolone (KENALOG) 0.1 % cream Apply topically 2 times daily Apply topically 2 times daily. Yes SAMPSON Mcdonald CNP   sildenafil (REVATIO) 20 MG tablet Take 1-5 tablets by mouth as needed Yes Historical Provider, MD   thyroid (ARMOUR) 130 MG tablet Take 130 mg by mouth daily Yes Historical Provider, MD   Testosterone 20 % CREA by Does not apply route. . Yes Historical Provider, MD   ibuprofen (ADVIL;MOTRIN) 200 MG tablet Take 200 mg by mouth daily Indications: 800 MG TWICE DAILY  Yes Historical Provider, MD   Cholecalciferol (VITAMIN D3) 5000 units TABS Take 1 tablet by mouth every other day  Yes Historical Provider, MD   Nutritional Supplements (DHEA PO) Take 35 mg by mouth daily Yes Historical Provider, MD     Family History  Family History   Problem Relation Age of Onset    Diabetes Mother     Breast Cancer Mother     Cancer Mother         bladder    Coronary Art Dis Father     Colon Cancer Father     Heart Surgery Brother 54        CABG    Coronary Art Dis Brother 54        Stent @70    High Cholesterol Brother     No Known Problems Brother     Depression Daughter     Anxiety Disorder Daughter     Eczema Daughter     Depression Son     Anxiety Disorder Son     Asthma Son        Current Medications  Current Outpatient Medications   Medication Sig Dispense Refill    terbinafine (LAMISIL) 250 MG tablet Take 250 mg by mouth daily      hydroCHLOROthiazide (HYDRODIURIL) 25 MG tablet TAKE 1 TABLET BY MOUTH ONE TIME A DAY **NEEDS APPOINTMENT FOR DURTHER REFILLS** 28 tablet 0    ketorolac (TORADOL) 10 MG tablet Take 1 tablet by mouth every 6 hours as needed for Pain 20 tablet 0    metoprolol tartrate (LOPRESSOR) 50 MG tablet TAKE 1 TABLET BY MOUTH 3 TIMES A  tablet 1    atorvastatin (LIPITOR) 40 MG tablet TAKE 1 TABLET BY MOUTH ONE TIME A DAY 90 tablet 0    zolpidem (AMBIEN) 10 MG tablet TAKE ONE-HALF TABLET BY MOUTH EVERY EVENING AS NEEDED FOR SLEEP 30 tablet 5    folic acid (FOLVITE) 1 MG tablet TAKE 1 TABLET BY MOUTH ONE TIME A DAY 90 tablet 0    clomiPRAMINE (ANAFRANIL) 25 MG capsule TAKE THREE CAPSULES BY MOUTH EVERY EVENING (Patient taking differently: Take 50 mg by mouth nightly ) 270 capsule 1    clobetasol (TEMOVATE) 0.05 % cream Apply topically 2 times daily Apply topically 2 times daily. 120 g 2    triamcinolone (KENALOG) 0.1 % cream Apply topically 2 times daily Apply topically 2 times daily. 80 g 2    sildenafil (REVATIO) 20 MG tablet Take 1-5 tablets by mouth as needed  3    thyroid (ARMOUR) 130 MG tablet Take 130 mg by mouth daily      Testosterone 20 % CREA by Does not apply route. Quintella Dun ibuprofen (ADVIL;MOTRIN) 200 MG tablet Take 200 mg by mouth daily Indications: 800 MG TWICE DAILY       Cholecalciferol (VITAMIN D3) 5000 units TABS Take 1 tablet by mouth every other day       Nutritional Supplements (DHEA PO) Take 35 mg by mouth daily       No current facility-administered medications for this visit. REVIEW OF SYSTEMS:    CONSTITUTIONAL: No major weight gain or loss, fatigue, weakness, night sweats or fever. HEENT: No new vision difficulties or ringing in the ears. RESPIRATORY: No new SOB, PND, orthopnea or cough. CARDIOVASCULAR: See HPI  GI: No nausea, vomiting, diarrhea, constipation, abdominal pain or changes in bowel habits. : No urinary frequency, urgency, incontinence hematuria or dysuria. SKIN: No cyanosis or skin lesions. MUSCULOSKELETAL: No new muscle or joint pain. NEUROLOGICAL: No syncope or TIA-like symptoms.   PSYCHIATRIC: No anxiety, pain, insomnia or depression    Objective:   PHYSICAL EXAM:        VITALS:    Wt Readings from Last 3 Encounters:   04/14/22 219 lb (99.3 kg)   04/03/22 223 lb 1.7 oz (101.2 kg)   03/11/22 225 lb (102.1 kg)     BP Readings from Last 3 Encounters:   04/14/22 (!) 150/90   04/03/22 (!) 164/79   03/29/22 (!) 155/90     Pulse Readings from Last 3 Encounters:   04/14/22 78   04/03/22 84   03/29/22 74       CONSTITUTIONAL: Cooperative, no apparent distress, and appears well nourished / developed  NEUROLOGIC:  Awake and orientated to person, place and time. PSYCH: Calm affect. SKIN: Warm and dry. HEENT: Sclera non-icteric, normocephalic, neck supple, no elevation of JVP, normal carotid pulses with no bruits and thyroid normal size. LUNGS:  No increased work of breathing and clear to auscultation, no crackles or wheezing  CARDIOVASCULAR:  Regular rate and rhythm with no murmurs, gallops, rubs, or abnormal heart sounds, normal PMI. The apical impulses not displaced  Heart tones are crisp and normal  Cervical veins are not engorged  The carotid upstroke is normal in amplitude and contour without delay or bruit  JVP is not elevated  ABDOMEN:  Normal bowel sounds, non-distended and non-tender to palpation  EXT: No edema, no calf tenderness. Pulses are present bilaterally.     DATA:    Lab Results   Component Value Date    ALT 34 03/24/2022    AST 26 03/24/2022    ALKPHOS 84 03/24/2022    BILITOT 0.7 03/24/2022     Lab Results   Component Value Date    CREATININE 1.4 (H) 04/03/2022    BUN 17 04/03/2022     04/03/2022    K 3.4 (L) 04/03/2022    CL 94 (L) 04/03/2022    CO2 27 04/03/2022     No results found for: TSH, T4TMGIL, I7KUDFE, THYROIDAB  Lab Results   Component Value Date    WBC 8.2 04/03/2022    HGB 20.3 (HH) 04/03/2022    HCT 59.1 (H) 04/03/2022    MCV 89.1 04/03/2022     04/03/2022     No components found for: CHLPL  Lab Results   Component Value Date    TRIG 124 01/20/2022    TRIG 94 01/25/2019    TRIG 220 (H) 05/08/2018 Lab Results   Component Value Date    HDL 38 (L) 01/20/2022    HDL 22 (L) 01/07/2021    HDL 39 (L) 01/25/2019     Lab Results   Component Value Date    LDLCALC 88 01/20/2022    LDLCALC 80 01/07/2021    LDLCALC 61 01/25/2019     Lab Results   Component Value Date    LABVLDL 25 01/20/2022    LABVLDL 29 01/07/2021    LABVLDL 19 01/25/2019     Radiology Review:  Pertinent images / reports were reviewed as a part of this visit and reveals the following:    Echo:Summary 5/4/2019   Left ventricular cavity size is normal. There is mildly increased left   ventricular wall thickness. Ejection fraction is visually estimated to be   55-60%. Normal diastolic function. Mitral annular calcification is present. Stress Test / Angiogram:    ECG: EKG on 3/24/2022 shows sinus rhythm 58/min right bundle branch block. This patient was educated using the patient point room wall mount device. Absence from smokers and smoking and diet and exercising are important. Assessment:   Hypertension is seems to be trending. I do not see a need to change his medications currently however have asked him to trend them over the next several weeks. Apparently he has whitecoat syndrome. The H&H is a bit high and may be related to the testosterone cream or it may be related to the being relatively dry. Plan:   #1 continue to track and trend blood pressures at home we may have to increase his medications. #2 electrolytes and CBC in mid May of this year  Please cc this note to Dr. Latasha Rincon  Return to see me in 6 months    Please call if we can assist further [phone]. Dwayne Urias.  Demian HAYDEN, [address] [facility]      This note was likely completed using voice recognition technology and may contain unintended errors

## 2022-04-20 DIAGNOSIS — H91.93 BILATERAL HEARING LOSS, UNSPECIFIED HEARING LOSS TYPE: Primary | ICD-10-CM

## 2022-04-20 NOTE — PROGRESS NOTES
Asael Esteban   1955, 75 y.o. male   [id number]       Referring Provider: Neha Banda MD  Referral Type: In an order in [address] [place] [address]    Reason for Visit: Evaluation of suspected change in hearing, tinnitus, or balance. ADULT AUDIOLOGIC EVALUATION      Asael Esteban is a 75 y.o. male seen today, 4/21/2022 , for a recheck audiologic evaluation. Patient was seen by Neha Banda MD following today's evaluation. AUDIOLOGIC AND OTHER PERTINENT MEDICAL HISTORY:      Asael Esteban noted significant improvement in right ear hearing since last audiogram on 3/28/2022    Previous history and results from audiologic evaluation on 3/28/2022:  Asael Esteban noted no significant change since last audiogram on 3/11/2020. AD: Mild MHL, Excellent WRS, Type B tymp  AS: Mild SNHL rising to Hearing WNL sloping to Moderate SNHL, Excellent WRS, Type C tymp     Previous history and results from audiologic evaluation on 3/11/2020:  Pal Mccabe noted aural fullness and history of ear surgery.  Patient reports long standing history of ear infections and ETD AU. He states he last had bilateral PE Tubes placed about 3 years ago. Patient notes he is usually able to \"pop\" ears by using the valsalva technique. He states his hearing is better in the left ear.   AD:Mild MHL, Good WRS, Type B tymp  AS:Mild SNHL rising to Hearing WNL sloping to Mild SNHL, Excellent WRS, Type C tymp    Date: 4/21/2022     IMPRESSIONS:      AU: Hearing WNL sloping to Moderate SNHL, Excellent WRS, Type A tymps    Test results consistent with bilateral Sensorineural hearing loss. Hearing loss significant enough to create hearing difficulty in some listening situations. Discussed hearing loss and hearing aids with patient.  Patient to follow medical recommendations per Neha Banda MD .    ASSESSMENT AND FINDINGS:     Otoscopy revealed: Clear ear canals bilaterally    RIGHT EAR:  Hearing Sensitivity: Normal hearing sensitivity to  Moderate Sensorineural hearing loss  Speech Recognition Threshold: 25 dB HL  Word Recognition:Excellent (100%), based on NU-6 25-word list at 65 dBHL using recorded speech stimuli. Tympanometry: Normal peak pressure and compliance, Type A tympanogram, consistent with normal middle ear function. Acoustic Reflexes: Ipsilateral: Did not test. Contralateral: Did not test.    LEFT EAR:  Hearing Sensitivity: Normal hearing sensitivity to  Moderate   Sensorineural hearing loss  Speech Recognition Threshold: 25 dB HL  Word Recognition:Excellent (100%), based on NU-6 25-word list at 65 dBHL using recorded speech stimuli. Tympanometry: Normal peak pressure and compliance, Type A tympanogram, consistent with normal middle ear function. Acoustic Reflexes: Ipsilateral: Did not test. Contralateral: Did not test.    Reliability: Good  Transducer: Inserts    See scanned audiogram dated 4/21/2022  for results. PATIENT EDUCATION:     The following items were discussed with the patient:   - Good Communication Strategies  - Hearing Loss and Hearing Aids    Educational information was shared in the After Visit Summary. RECOMMENDATIONS:                                                                                                                                                                                                                                                          The following items are recommended based on patient report and results from today's appointment:   - Continue medical follow-up with Carlos Francis MD.   - Retest hearing as medically indicated and/or sooner if a change in hearing is noted. - If desired, schedule a Hearing Aid Evaluation (HAE) appointment to discuss hearing aid options. - Utilize \"Good Communication Strategies\" as discussed to assist in speech understanding with communication partners.        Mal Spears  Audiologist    Chart CC'd to: Ishan Jay MD      Degree of   Hearing Sensitivity dB Range   Within Normal Limits (WNL) 0 - 20   Mild 20 - 40   Moderate 40 - 55   Moderately-Severe 55 - 70   Severe 70 - 90   Profound 90 +

## 2022-04-21 ENCOUNTER — PROCEDURE VISIT (OUTPATIENT)
Dept: AUDIOLOGY | Age: 67
End: 2022-04-21
Payer: COMMERCIAL

## 2022-04-21 ENCOUNTER — OFFICE VISIT (OUTPATIENT)
Dept: ENT CLINIC | Age: 67
End: 2022-04-21

## 2022-04-21 VITALS — WEIGHT: 220.6 LBS | HEIGHT: 72 IN | BODY MASS INDEX: 29.88 KG/M2

## 2022-04-21 DIAGNOSIS — H69.83 DYSFUNCTION OF BOTH EUSTACHIAN TUBES: ICD-10-CM

## 2022-04-21 DIAGNOSIS — H90.3 SENSORINEURAL HEARING LOSS (SNHL) OF BOTH EARS: Primary | ICD-10-CM

## 2022-04-21 PROCEDURE — 99024 POSTOP FOLLOW-UP VISIT: CPT | Performed by: OTOLARYNGOLOGY

## 2022-04-21 PROCEDURE — 92557 COMPREHENSIVE HEARING TEST: CPT | Performed by: AUDIOLOGIST

## 2022-04-21 PROCEDURE — 92567 TYMPANOMETRY: CPT | Performed by: AUDIOLOGIST

## 2022-04-21 RX ORDER — FLUTICASONE PROPIONATE 50 MCG
1 SPRAY, SUSPENSION (ML) NASAL DAILY
Qty: 32 G | Refills: 1 | Status: SHIPPED | OUTPATIENT
Start: 2022-04-21

## 2022-04-21 NOTE — Clinical Note
Dr. Camlia Elizabeth,    Please see note from this patient's audiogram from today. Please let me know if there is anything further you need.         Mal Gilmore  Audiologist

## 2022-04-21 NOTE — PROGRESS NOTES
The patient is following up for his eustachian tube dilation that performed on March 29, 2022. He says that he feels as though his hearing significantly better. Does have some popping on the left ear. Bilateral ear exam.  On the right side the tympanic membrane is intact, mildly retracted. Middle ear appears to be aerated    On the left side the tympanic membrane is retracted with what appears to be  a partial serous middle ear effusion    Patient had an audiogram today that shows normal sloping to moderate sensorineural hearing loss. He has type a tympanograms, but quite a bit of negative pressure on the right side    Plan  Overall think the patient is significantly improved. The conductive component to his hearing loss has resolved. He does still have negative pressure on the right side and at least a little bit of a serous effusion on the left. I encouraged him to try to Valsalva and told him to use Flonase daily.   Follow-up in 6 months, sooner if needed

## 2022-04-21 NOTE — PATIENT INSTRUCTIONS
Good Communication Strategies    Communication can be challenging for anyone, but can be especially difficult for those with some degree of hearing loss. While we may not be able to control every factor that may lead to difficulty with communication, there are Good Communication Strategies that we can all use in our day-to-day lives. Communication takes both parties working together for it to be successful. Tips as a Listener:   1. Control your environment. It is important to limit the amount of background noise in the room when possible. You should also consider having a good light source in the room to best see the other person. 2. Ask for clarification. Instead of saying \"What?\", you can use parts of what you heard to make a new question. For example, if you heard the word \"Thursday\" but not the rest of the week, you may ask \"What was that about Thursday? \" or \"What did you want to do Thursday? \". This shows the person talking that you are listening and will help them better explain what they are saying. 3. Be an advocate for yourself. If you are hearing but not understanding, tell the other person \"I can hear you, but I need you to slow down when you speak. \"  Or if someone is facing the other direction, say \"I cannot hear you when you are not looking at me when we talk. \"       Tips as a Talker:   - Sit or stand 3 to 6 feet away to maximize audibility         -- It is unrealistic to believe someone else will fully hear your message if you are speaking from across the room or in a different room in the house   - Stay at eye level to help with visual cues   - Make sure you have the persons attention before speaking   - Use facial expressions and gestures to accentuate your message   - Raise your voice slightly (do not scream)   - Speak slowly and distinctly   - Use short, simple sentences   - Rephrase your words if the person is having a hard time understanding you    - To avoid distortion, dont speak directly into a persons ear      Some additional items that may be helpful:   - Remain patient - this is important for both parties   - Write down items that still cannot be heard/understood. You may write with pen/paper or consider typing/texting on a cell phone or smart device. - If background noise is unavoidable, try to keep yourself in a good position in the room. By sitting at a [place] on the side of the restaurant (preferably a corner), it will be easier to communicate than if you were sitting at a table in the middle with background noise surrounding you. Keep yourself positioned away from music speakers or heavy foot traffic. Hearing Loss: Care Instructions  Your Care Instructions      Hearing loss is a sudden or slow decrease in how well you hear. It can range from mild to profound. Permanent hearing loss can occur with aging, and it can happen when you are exposed long-term to loud noise. Examples include listening to loud music, riding motorcycles, or being around other loud machines. Hearing loss can affect your work and home life. It can make you feel lonely or depressed. You may feel that you have lost your independence. But hearing aids and other devices can help you hear better and feel connected to others. Follow-up care is a key part of your treatment and safety. Be sure to make and go to all appointments, and call your doctor if you are having problems. It's also a good idea to know your test results and keep a list of the medicines you take. How can you care for yourself at home? · Avoid loud noises whenever possible. This helps keep your hearing from getting worse. Always wear hearing protection around loud noises. · If appropriate, wear hearing aid(s) as directed. It is recommended that hearing aids are worn during all waking hours to keep your brain active and give it access to the sounds it is missing.       · If you are beginning your process with hearing aid(s), schedule a \"Hearing Aid Evaluation\" with an audiologist to discuss your lifestyle, features of hearing aid technology, and styles of hearing aids available. It is recommended that you contact your insurance company to determine if you have a hearing aid benefit, as this may dictate who you can see for these services. · Have hearing tests as your doctor suggests. They can show whether your hearing has changed. Your hearing aid may need to be adjusted. · Use other assistive devices as needed. These may include:  ? Telephone amplifiers and hearing aids that can connect to a television, stereo, radio, or microphone. ? Devices that use lights or vibrations. These alert you to the doorbell, a ringing telephone, or a baby monitor. ? Television closed-captioning. This shows the words at the bottom of the screen. Most new TVs can do this. ? TTY (text telephone). This lets you type messages back and forth on the telephone instead of talking or listening. These devices are also called TDD. When messages are typed on the keyboard, they are sent over the phone line to a receiving TTY. The message is shown on a monitor. · Use pagers, fax machines, text, and email if it is hard for you to communicate by telephone. · Try to learn a listening technique called speech-reading. It is not lip-reading. You pay attention to people's gestures, expressions, posture, and tone of voice. These clues can help you understand what a person is saying. Face the person you are talking to, and have him or her face you. Make sure the lighting is good. You need to see the other person's face clearly. · Think about counseling if you need help to adjust to your hearing loss. When should you call for help? Watch closely for changes in your health, and be sure to contact your doctor if:    · You think your hearing is getting worse. · You have new symptoms, such as dizziness or nausea.            Learning About Hearing Aids  What is a hearing aid?    A hearing aid makes sounds louder. It can help some people with hearing problems to hear better. Hearing aids do not restore normal hearing. But they can make it easier to communicate by making sounds clearer. · Digital programmable hearing aids can adjust themselves to work best where you are at any time. You also have more choices in setting them up than with analog hearing aids. There are also different styles of hearing aids. · A behind-the-ear (BTE) hearing aid connects to a plastic ear mold that fits inside the outer ear. BTE hearing aids are used for all levels of hearing loss, especially very severe hearing loss. They may be better for children for safety and growth reasons. Poorly fitting BTE ear molds or a buildup of earwax may cause a whistling sound (feedback). · An in-the-ear (ITE) hearing aid fits in the outer part of the ear. It can be used by people with mild to severe hearing loss. ITE hearing aids can be used with other hearing devices, such as a telecoil that improves hearing during phone calls. ITE hearing aids can be damaged by earwax and fluid draining from the ear. Their small size may be hard for some people to handle. They are not often used in children because the case must be replaced as the child grows. · An in-the-canal (ITC) hearing aid fits into the ear canal. ITC hearing aids are used by people with mild to moderate hearing loss. They are made to fit the shape and the size of your ear canal. They can be damaged by earwax and fluid draining from the ear. Their small size may be hard for some people to handle. They are not made for children. You have some options if you have a hearing problem and are thinking about getting hearing aids. You can go to your doctor or an audiologist. He or she will do a hearing test and help you decide which type and style of hearing aid may be best for you. What else should I know about hearing aids?   Find out if your insurance covers hearing aids. They can be expensive. Different types of hearing aids come with different costs. Also find out about a warranty or return policy in case you are not happy with your hearing aids. Follow-up care is a key part of your treatment and safety. Be sure to make and go to all appointments, and call your doctor if you are having problems. It's also a good idea to know your test results and keep a list of the medicines you take. Where can you learn more? Go to [email or web address]://[email or web address] org and sign in to your [employer] account. Enter P642 in the [employer] box to learn more about \"Learning About Hearing Aids. \"     If you do not have an account, please click on the \"Sign Up Now\" link. Current as of: October 21, 2018  Content Version: 12.1  © 0864-0903 [employer]. Care instructions adapted under license by [facility] ([facility]). If you have questions about a medical condition or this instruction, always ask your healthcare professional. [place] [address] any warranty or liability for your use of this information. If you are interested in pursuing hearing aids, here are the next steps:    1) Contact your insurance and ask, Do I have a benefit for hearing aids?    If the answer is no hearing aids will be a 100% out of pocket expense   If the answer is yes, ask Can I use this benefit at [facility] ([facility])?  or Where can I use this benefit?  If Ludwin Franco is not in-network for hearing aids, your insurance should be able to provide the appropriate contact information for an in-network provider. 2) Call [facility] ([phone]) to schedule a Hearing Aid Evaluation    This appointment is when we will discuss hearing aid options and decide which device is most appropriate for you.

## 2022-05-09 RX ORDER — HYDROCHLOROTHIAZIDE 25 MG/1
TABLET ORAL
Qty: 90 TABLET | Refills: 1 | Status: SHIPPED | OUTPATIENT
Start: 2022-05-09

## 2022-05-19 RX ORDER — ATORVASTATIN CALCIUM 40 MG/1
TABLET, FILM COATED ORAL
Qty: 90 TABLET | Refills: 0 | Status: SHIPPED | OUTPATIENT
Start: 2022-05-19 | End: 2022-08-22

## 2022-05-19 NOTE — TELEPHONE ENCOUNTER
LV 3/24/22 WITH DR Qasim West FOR PRE OP NV NONE   Return in about 4 months (around 7/13/2022) for Hypertension, Cholesterol

## 2022-06-07 RX ORDER — CLOMIPRAMINE HYDROCHLORIDE 25 MG/1
CAPSULE ORAL
Qty: 270 CAPSULE | Refills: 0 | Status: SHIPPED | OUTPATIENT
Start: 2022-06-07

## 2022-06-17 ENCOUNTER — OFFICE VISIT (OUTPATIENT)
Dept: ENT CLINIC | Age: 67
End: 2022-06-17

## 2022-06-17 DIAGNOSIS — H73.20 MYRINGITIS: Primary | ICD-10-CM

## 2022-06-17 PROCEDURE — 99024 POSTOP FOLLOW-UP VISIT: CPT | Performed by: OTOLARYNGOLOGY

## 2022-06-17 RX ORDER — CIPROFLOXACIN AND DEXAMETHASONE 3; 1 MG/ML; MG/ML
4 SUSPENSION/ DROPS AURICULAR (OTIC) 2 TIMES DAILY
Qty: 1 EACH | Refills: 0 | Status: SHIPPED | OUTPATIENT
Start: 2022-06-17 | End: 2022-06-27

## 2022-06-17 NOTE — PROGRESS NOTES
Patient is following up for his ears. I removed a retained right ear tube and a eustachian dilation on March 29. Overall has been doing well but has had a little bit of drainage with blood in it on the right side the last few days. Little bit of diminished hearing, but he still thinks is better than prior to surgery    Exam  Right ear is visualized microscope. I evacuated bit of purulent drainage. There appeared to be some granulation tissue on the tympanic membrane. On the left side tympanic membrane was intact with aerated middle ear    Plan  Patient appears to have a bit of granulation tissue on the tympanic membrane and  a mild otitis externa. I will give him a 10-day course of Ciprodex drops. If its not better at that time a new taken of a look at it.

## 2022-06-24 ENCOUNTER — NURSE TRIAGE (OUTPATIENT)
Dept: OTHER | Facility: CLINIC | Age: 67
End: 2022-06-24

## 2022-06-24 NOTE — TELEPHONE ENCOUNTER
Received call from [facility] with Red Flag Complaint. Subjective: Caller states \"Weakness\"     Current Symptoms: Tired and chills at night. Congestion. Covid home test negative today. Onset: a few hours ago; gradual    Associated Symptoms: increased sleepiness    Pain Severity: 0/10; N/A; none    Temperature: Advil  What has been tried: None    LMP: NA Pregnant: NA    Recommended disposition: Go to [facility] Now    Care advice provided, patient verbalizes understanding; denies any other questions or concerns; instructed to call back for any new or worsening symptoms. Patient/Caller agrees with recommended disposition; writer provided warm transfer to [facility] for appointment scheduling     Attention Provider: Thank you for allowing me to participate in the care of your patient. The patient was connected to triage in response to information provided to the ECC/PSC. Please do not respond through this encounter as the response is not directed to a shared pool.       Reason for Disposition   MODERATE weakness (i.e., interferes with work, school, normal activities) and cause unknown (Exceptions: weakness with acute minor illness, or weakness from poor fluid intake)    Protocols used: WEAKNESS (GENERALIZED) AND FATIGUE-ADULT-[place]

## 2022-06-25 NOTE — TELEPHONE ENCOUNTER
Spoke with patient. [place]. He was exposed to Chandra in the office. He started feeling bad yesterday 6/24. He has done to home rapid test both negative. Explained the rapid tests are only about 70% accurate. Accuracy goes down after immunization because people shed less virus after immunization. It may be 3 to 4 days before Home tests are positive. We would need to treat prior to day 5. Therefore if he is getting worse should be tested tomorrow and treated on Monday if possible. PCR test take overnight but are much more accurate. He can get that done in an urgent care there but should make sure it is a PCR test and not a home test.  We will send information on over-the-counter treatment for COVID-19. He does have dermatomyositis which is an autoimmune disorder which could increase his risk of a bad response to COVID-19. However he has already had COVID-19 once. He has also had 2 immunizations and 1 booster.

## 2022-07-08 ENCOUNTER — HOSPITAL ENCOUNTER (EMERGENCY)
Age: 67
Discharge: HOME | End: 2022-07-08
Payer: COMMERCIAL

## 2022-07-08 ENCOUNTER — APPOINTMENT (OUTPATIENT)
Dept: GENERAL RADIOLOGY | Age: 67
End: 2022-07-08
Payer: COMMERCIAL

## 2022-07-08 VITALS
SYSTOLIC BLOOD PRESSURE: 163 MMHG | OXYGEN SATURATION: 99 % | TEMPERATURE: 98.3 F | DIASTOLIC BLOOD PRESSURE: 99 MMHG | RESPIRATION RATE: 18 BRPM | HEART RATE: 82 BPM

## 2022-07-08 DIAGNOSIS — I10 POOR HIGH BLOOD PRESSURE CONTROL: ICD-10-CM

## 2022-07-08 DIAGNOSIS — M70.72 BURSITIS OF LEFT HIP, UNSPECIFIED BURSA: Primary | ICD-10-CM

## 2022-07-08 PROCEDURE — 99283 EMERGENCY DEPT VISIT LOW MDM: CPT

## 2022-07-08 PROCEDURE — 73502 X-RAY EXAM HIP UNI 2-3 VIEWS: CPT

## 2022-07-08 PROCEDURE — 6370000000 HC RX 637 (ALT 250 FOR IP): Performed by: GENERAL ACUTE CARE HOSPITAL

## 2022-07-08 RX ORDER — OXYCODONE HYDROCHLORIDE AND ACETAMINOPHEN 5; 325 MG/1; MG/1
1 TABLET ORAL ONCE
Status: COMPLETED | OUTPATIENT
Start: 2022-07-08 | End: 2022-07-08

## 2022-07-08 RX ADMIN — OXYCODONE AND ACETAMINOPHEN 1 TABLET: 5; 325 TABLET ORAL at 18:06

## 2022-07-08 ASSESSMENT — ENCOUNTER SYMPTOMS
BACK PAIN: 0
VOICE CHANGE: 0
ABDOMINAL PAIN: 0
VOMITING: 0
COUGH: 0
SHORTNESS OF BREATH: 0
SORE THROAT: 0
CHEST TIGHTNESS: 0
NAUSEA: 0

## 2022-07-08 ASSESSMENT — PAIN DESCRIPTION - PAIN TYPE
TYPE: ACUTE PAIN
TYPE: ACUTE PAIN

## 2022-07-08 ASSESSMENT — PAIN DESCRIPTION - DESCRIPTORS
DESCRIPTORS: ACHING
DESCRIPTORS: SHARP
DESCRIPTORS: ACHING

## 2022-07-08 ASSESSMENT — PAIN DESCRIPTION - LOCATION
LOCATION: HIP

## 2022-07-08 ASSESSMENT — PAIN - FUNCTIONAL ASSESSMENT
PAIN_FUNCTIONAL_ASSESSMENT: 0-10
PAIN_FUNCTIONAL_ASSESSMENT: 0-10

## 2022-07-08 ASSESSMENT — PAIN DESCRIPTION - ORIENTATION
ORIENTATION: LEFT
ORIENTATION: LEFT

## 2022-07-08 ASSESSMENT — PAIN DESCRIPTION - FREQUENCY
FREQUENCY: INTERMITTENT
FREQUENCY: INTERMITTENT

## 2022-07-08 ASSESSMENT — PAIN SCALES - GENERAL
PAINLEVEL_OUTOF10: 3
PAINLEVEL_OUTOF10: 1
PAINLEVEL_OUTOF10: 7

## 2022-07-08 NOTE — Clinical Note
Gold Rivera was seen and treated in our emergency department on 7/8/2022. He may return to work on 07/11/2022. If you have any questions or concerns, please don't hesitate to call.       Laura Jha, APRN - CNP

## 2022-07-08 NOTE — ED PROVIDER NOTES
[address] [facility]        Pt Name: Aniyah Lawrence  MRN: [id number]  [place] 1955  Date of evaluation: 7/8/2022  Provider: SAMPSON Aquino CNP  PCP: Bia Hernandez DO  Note Started: 7:07 PM EDT       MAGDALENO. I have evaluated this patient. My supervising physician was available for consultation. CHIEF COMPLAINT       Chief Complaint   Patient presents with    Hip Pain     hiking in [place], left hip pain with ambulation. medrol dose pack started today with no relief. NKI       HISTORY OF PRESENT ILLNESS   (Location, Timing/Onset, Context/Setting, Quality, Duration, Modifying Factors, Severity, Associated Signs and Symptoms)  Note limiting factors. Chief Complaint: Left hip pain    Aniyah Lawrence is a 75 y.o. male who presents to the emergency department today reporting left hip pain which he first noticed on Tuesday. Patient states that he was hiking all weekend in [place] but denies specific injury. He denies history of any chronic left hip problems. He currently ports a pain level of 7 out of 10. He describes the pain as constant dull and throbbing. He states that he has sharp pains with movement and with attempted ambulation. Patient states that he started a Medrol Dosepak today but has not had any relief of his symptoms. He denies fever, chills, or other symptoms. Nursing Notes were all reviewed and agreed with or any disagreements were addressed in the HPI. REVIEW OF SYSTEMS    (2-9 systems for level 4, 10 or more for level 5)     Review of Systems   Constitutional: Negative for chills, fatigue and fever. HENT: Negative for congestion, dental problem, sore throat and voice change. Eyes: Negative for visual disturbance. Respiratory: Negative for cough, chest tightness and shortness of breath. Cardiovascular: Negative for chest pain and palpitations.    Gastrointestinal: Negative for abdominal pain, nausea and vomiting. Endocrine: Negative for polydipsia and polyuria. Genitourinary: Negative for difficulty urinating, dysuria and flank pain. Musculoskeletal: Positive for arthralgias and gait problem. Negative for back pain, neck pain and neck stiffness. Skin: Negative for rash and wound. Allergic/Immunologic: Negative for immunocompromised state. Neurological: Negative for dizziness, weakness and light-headedness. Hematological: Does not bruise/bleed easily. Psychiatric/Behavioral: Negative for suicidal ideas. Positives and Pertinent negatives as per HPI. Except as noted above in the ROS, all other systems were reviewed and negative. PAST MEDICAL HISTORY     Past Medical History:   Diagnosis Date    Anxiety     BPH (benign prostatic hyperplasia) 2016    Chronic back pain     Colon polyps 2005    Coronary atherosclerosis 05/10/2019    cath, LAD had 20-25% narrowing but required no intervention other than maximizing medical tx.  COVID-19 virus infection     Dermatomyositis ([facility] [address]     ED (erectile dysfunction)     History of skin cancer     Homocysteinemia 01/07/2021    Homocystine 20 on 1/7/21.  Hyperlipidemia     Hypertension 2008    Hypothyroidism     Insomnia     Osteoarthritis     Right bundle branch block left axis -bifascicular block 03/07/2019    -Right bundle branch block with .     Spinal cord tumor 2011    Testosterone deficiency     Wears glasses          SURGICAL HISTORY     Past Surgical History:   Procedure Laterality Date    CARDIAC CATHETERIZATION  05/07/2019    COLONOSCOPY  09/13/2012    Dr. Fatou Austin COLONOSCOPY  04/16/2005    Dr. Shaun Damon, adenomatous polyps    COLONOSCOPY  06/25/2009    Dr. Fatou Austin COLONOSCOPY  12/30/2015    Dr. Shaun Damon, recheck 2020    INNER EAR SURGERY Bilateral 3/29/2022    BILATERAL EUSTACHIAN TUBE DILATION RIGHT EAR EXAM WITH REMOVAL OF RETAINED EAR TUBE performed by Ivonne Coleman MD at [place] [address] LAMINECTOMY  2008    L1-L2    NASAL SEPTUM SURGERY  1976    NERVE BIOPSY  2011    Spine:  myxopapillary ependymoma     PROSTATE SURGERY N/A 03/01/2021    UROLIFT at urology center [facility]. Dr Noy Seay.  TYMPANOSTOMY TUBE PLACEMENT Bilateral 2017    UPPER GASTROINTESTINAL ENDOSCOPY  09/13/2012    Dr. Jeremie Barry       Discharge Medication List as of 7/8/2022  7:15 PM      CONTINUE these medications which have NOT CHANGED    Details   clomiPRAMINE (ANAFRANIL) 25 MG capsule TAKE THREE CAPSULES BY MOUTH EVERY EVENING, Disp-270 capsule, R-0Normal      atorvastatin (LIPITOR) 40 MG tablet TAKE 1 TABLET BY MOUTH ONE TIME A DAY, Disp-90 tablet, R-0Normal      hydroCHLOROthiazide (HYDRODIURIL) 25 MG tablet TAKE 1 TABLET BY MOUTH ONE TIME A DAY **NEED APPOINTMENT FOR FURTHER REFILLS**, Disp-90 tablet, R-1Normal      fluticasone (FLONASE) 50 MCG/ACT nasal spray 1 spray by Each Nostril route daily, Disp-32 g, R-1Normal      terbinafine (LAMISIL) 250 MG tablet Take 250 mg by mouth dailyHistorical Med      ketorolac (TORADOL) 10 MG tablet Take 1 tablet by mouth every 6 hours as needed for Pain, Disp-20 tablet, R-0Print      metoprolol tartrate (LOPRESSOR) 50 MG tablet TAKE 1 TABLET BY MOUTH 3 TIMES A DAY, Disp-270 tablet, R-1Normal      zolpidem (AMBIEN) 10 MG tablet TAKE ONE-HALF TABLET BY MOUTH EVERY EVENING AS NEEDED FOR SLEEP, Disp-30 tablet, [id number]      folic acid (FOLVITE) 1 MG tablet TAKE 1 TABLET BY MOUTH ONE TIME A DAY, Disp-90 tablet, R-0Normal      clobetasol (TEMOVATE) 0.05 % cream Apply topically 2 times daily Apply topically 2 times daily. , Topical, 2 TIMES DAILY Starting Mon 6/14/2021, Disp-120 g, R-2, Normal      triamcinolone (KENALOG) 0.1 % cream Apply topically 2 times daily Apply topically 2 times daily. , Topical, 2 TIMES DAILY Starting Mon 4/13/2020, Disp-80 g, R-2, Normal      sildenafil (REVATIO) 20 MG tablet Take 1-5 tablets by mouth as needed, R-3Historical Med      thyroid (ARMOUR) 130 MG tablet Take 130 mg by mouth dailyHistorical Med      Testosterone 20 % CREA by Does not apply route. [place] Historical Med      ibuprofen (ADVIL;MOTRIN) 200 MG tablet Take 200 mg by mouth daily Indications: 800 MG TWICE DAILY Historical Med      Cholecalciferol (VITAMIN D3) 5000 units TABS Take 1 tablet by mouth every other day Historical Med      Nutritional Supplements (DHEA PO) Take 35 mg by mouth dailyHistorical Med               ALLERGIES     Patient has no known allergies. FAMILYHISTORY       Family History   Problem Relation Age of Onset    Diabetes Mother     Breast Cancer Mother     Cancer Mother         bladder    Coronary Art Dis Father     Colon Cancer Father     Heart Surgery Brother 54        CABG    Coronary Art Dis Brother 54        Stent @70    High Cholesterol Brother     No Known Problems Brother     Depression Daughter     Anxiety Disorder Daughter     Eczema Daughter     Depression Son     Anxiety Disorder Son     Asthma Son           SOCIAL HISTORY       Social History     Tobacco Use    Smoking status: Former Smoker     Packs/day: 1.00     Years: 33.00     Pack years: 33.00     Types: Cigarettes     Start date: 1972     Quit date:      Years since quittin.5    Smokeless tobacco: Never Used   Vaping Use    Vaping Use: Never used   Substance Use Topics    Alcohol use: Yes     Alcohol/week: 5.0 standard drinks     Types: 5 Shots of liquor per week     Comment: OCCASIONAL.  Drug use: Never       SCREENINGS    Martha Coma Scale  Eye Opening: Spontaneous  Best Verbal Response: Oriented  Best Motor Response: Obeys commands  B[place]ler Coma Scale Score: 15        PHYSICAL EXAM    (up to 7 for level 4, 8 or more for level 5)     ED Triage Vitals [22 1729]   BP Temp Temp Source Heart Rate Resp SpO2 Height Weight   (!) 181/110 98.3 °F (36.8 °C) Oral (!) 112 18 99 % -- --       Physical Exam  Vitals and nursing note reviewed.    Constitutional: General: He is in acute distress. Appearance: Normal appearance. He is not ill-appearing. HENT:      Head: Normocephalic and atraumatic. Right Ear: External ear normal.      Left Ear: External ear normal.      Nose: Nose normal.      Mouth/Throat:      Mouth: Mucous membranes are moist.      Pharynx: Oropharynx is clear. Eyes:      General:         Right eye: No discharge. Left eye: No discharge. Extraocular Movements: Extraocular movements intact. Conjunctiva/sclera: Conjunctivae normal.   Cardiovascular:      Rate and Rhythm: Normal rate and regular rhythm. Pulses: Normal pulses. Heart sounds: Normal heart sounds. Pulmonary:      Effort: Pulmonary effort is normal. No respiratory distress. Breath sounds: Normal breath sounds. Abdominal:      Palpations: Abdomen is soft. Tenderness: There is no abdominal tenderness. Musculoskeletal:      Cervical back: Normal range of motion and neck supple. No rigidity or tenderness. Left hip: Tenderness and bony tenderness present. No deformity, lacerations or crepitus. Decreased range of motion. Decreased strength. Skin:     General: Skin is warm and dry. Capillary Refill: Capillary refill takes less than 2 seconds. Neurological:      General: No focal deficit present. Mental Status: He is alert and oriented to person, place, and time. Psychiatric:         Mood and Affect: Mood normal.         Behavior: Behavior normal.         Thought Content: Thought content normal.         Judgment: Judgment normal.         DIAGNOSTIC RESULTS   LABS:    Labs Reviewed - No data to display    When ordered only abnormal lab results are displayed. All other labs were within normal range or not returned as of this dictation. EKG: When ordered, EKG's are interpreted by the Emergency Department Physician in the absence of a cardiologist.  Please see their note for interpretation of EKG.     RADIOLOGY:   Non-plain film images such as CT, Ultrasound and MRI are read by the radiologist. Plain radiographic images are visualized and preliminarily interpreted by the ED Provider with the below findings:        Interpretation per the Radiologist below, if available at the time of this note:    XR HIP 2-3 VW W PELVIS LEFT   Final Result   Moderate degenerative disease of the left hip. No acute abnormality detected. If the patient's symptoms persist, consider further evaluation with   nonemergent MRI. XR HIP 2-3 VW W PELVIS LEFT    Result Date: 7/8/2022  EXAMINATION: ONE XRAY VIEW OF THE PELVIS AND TWO XRAY VIEWS LEFT HIP 7/8/2022 2:48 pm COMPARISON: None. HISTORY: ORDERING SYSTEM PROVIDED HISTORY: left hip pain for one week. NKI TECHNOLOGIST PROVIDED HISTORY: Reason for exam:->left hip pain for one week. NKI Reason for Exam: left hip pain for one week. NKI FINDINGS: The ilioischial and iliopectineal lines are intact. The SI joints and pubic symphysis are congruent. The iliac wings are intact. Degenerative changes noted in the lower lumbar spine, incompletely evaluated. Dedicated imaging of the left hip shows no stress, insufficiency, or traumatic fracture. No dislocation. Moderate degenerative changes are seen, characterized by joint space loss and osteophytosis. Moderate degenerative disease of the left hip. No acute abnormality detected. If the patient's symptoms persist, consider further evaluation with nonemergent MRI.            PROCEDURES   Unless otherwise noted below, none     Procedures    CRITICAL CARE TIME   none    CONSULTS:  None      EMERGENCY DEPARTMENT COURSE and DIFFERENTIAL DIAGNOSIS/MDM:   Vitals:    Vitals:    07/08/22 1729 07/08/22 1917   BP: (!) 181/110 (!) 163/99   Pulse: (!) 112 82   Resp: 18    Temp: 98.3 °F (36.8 °C)    TempSrc: Oral    SpO2: 99%        Patient was given the following medications:  Medications   oxyCODONE-acetaminophen (PERCOCET) 5-325 MG per tablet 1 tablet (1 tablet Oral Given 7/8/22 1806)         Is this patient to be included in the SEP-1 Core Measure due to severe sepsis or septic shock? No   Exclusion criteria - the patient is NOT to be included for SEP-1 Core Measure due to: Infection is not suspected    Previous records reviewed in order to gain further information regarding patient's PMH as well as his HPI. Nursing notes reviewed. This is a 26-year-old male who presents for evaluation of left hip pain. Patient denies specific injury but states that he spent the weekend hiking in [place]. Physical exam complete. Patient arrives nontoxic, afebrile, hypertensive. He does appear uncomfortable. Patient is medicated with Percocet. Left hip and pelvis x-rays interpreted by radiologist and reviewed by myself shows degenerative changes of the left hip without evidence of acute fracture, see above report for full details. Patient reassessed. He has remained stable throughout ED visit. He reports improvement of symptoms after intervention. At this time there is no evidence of any life-threatening or emergent conditions requiring immediate intervention. Patient will be discharged with emphasis on close outpatient follow-up. He is advised to apply ice to the affected area for 20 minutes every 3-4 hours. He is advised to continue taking his Medrol Dosepak as prescribed until gone. He agrees to follow-up with his orthopedic physician on Monday. He agrees return for high fever, incessant vomiting, severe pain, any other worsening symptoms. Patient is counseled on the importance of good blood pressure control. FINAL IMPRESSION      1. Bursitis of left hip, unspecified bursa    2.  Poor high blood pressure control          DISPOSITION/PLAN   DISPOSITION Decision To Discharge 07/08/2022 07:07:49 PM      PATIENT REFERRED TO:  Lulu Mcdonald MD  [address] [facility]  [address] [place] [address]  [phone]    In 3 days        DISCHARGE

## 2022-07-11 ENCOUNTER — OFFICE VISIT (OUTPATIENT)
Dept: ORTHOPEDIC SURGERY | Age: 67
End: 2022-07-11
Payer: COMMERCIAL

## 2022-07-11 VITALS — WEIGHT: 220 LBS | HEIGHT: 72 IN | BODY MASS INDEX: 29.8 KG/M2

## 2022-07-11 DIAGNOSIS — M54.16 LEFT LUMBAR RADICULOPATHY: Primary | ICD-10-CM

## 2022-07-11 PROCEDURE — 99213 OFFICE O/P EST LOW 20 MIN: CPT | Performed by: ORTHOPAEDIC SURGERY

## 2022-07-11 PROCEDURE — 1123F ACP DISCUSS/DSCN MKR DOCD: CPT | Performed by: ORTHOPAEDIC SURGERY

## 2022-07-11 RX ORDER — AMOXICILLIN AND CLAVULANATE POTASSIUM 875; 125 MG/1; MG/1
TABLET, FILM COATED ORAL
COMMUNITY
Start: 2022-06-16 | End: 2022-07-11

## 2022-07-11 RX ORDER — METHYLPREDNISOLONE 4 MG/1
TABLET ORAL
COMMUNITY
Start: 2022-07-07 | End: 2022-07-11 | Stop reason: ALTCHOICE

## 2022-07-11 RX ORDER — PREDNISONE 10 MG/1
TABLET ORAL
Qty: 50 TABLET | Refills: 0 | Status: SHIPPED | OUTPATIENT
Start: 2022-07-11 | End: 2022-09-09 | Stop reason: ALTCHOICE

## 2022-07-11 RX ORDER — HYDROCODONE BITARTRATE AND ACETAMINOPHEN 5; 325 MG/1; MG/1
TABLET ORAL
COMMUNITY
Start: 2022-04-06 | End: 2022-09-09 | Stop reason: ALTCHOICE

## 2022-07-11 NOTE — PROGRESS NOTES
Sherry Larry is seen today for evaluation of his left leg. He thinks he hurt heard on a hiking trip last week. He began noticing swelling and pain later in the week. Started a Medrol Dosepak and that has helped but he feels significant pain with standing and numbness and tingling down the lateral leg. He has no pain at rest with sitting or laying down but 8 out of 10 pain with standing. He has intermittent low back pain over the last several years and does do a daily stretching routine. He has had a lumbar laminectomy and history of a benign spinal cord tumor. History: Patient's relevant past family, medical, and social history are reviewed as part of today's visit. ROS of pertinent positives and negatives as above; otherwise negative. General Exam:    Vitals: Height 6' (1.829 m), weight 220 lb (99.8 kg). Constitutional: Patient is adequately groomed with no evidence of malnutrition  Mental Status: The patient is oriented to time, place and person. The patient's mood and affect are appropriate. Gait:  Patient walks with normal gait and station. Lymphatic: The lymphatic examination bilaterally reveals all areas to be without enlargement or induration. Vascular: Examination reveals no swelling or calf tenderness. Peripheral pulses are palpable and 2+. Neurological: The patient has good coordination. There is no weakness or sensory deficit. Skin:    Head/Neck: inspection reveals no rashes, ulcerations or lesions. Trunk:  inspection reveals no rashes, ulcerations or lesions. Right Lower Extremity: inspection reveals no rashes, ulcerations or lesions. Left Lower Extremity: inspection reveals no rashes, ulcerations or lesions. Hip rotation is normal bilaterally. He has mild pain with straight leg raise on the left side. He has no pain to palpation that I can reproduce over the greater trochanter or lateral leg.     X-rays were reviewed AP pelvis shows anchors in position from a prior prostate surgery. He has mild degenerative change of the hip and moderate degenerative change to the lumbar spine. Assessment: Probable left lumbar radiculopathy. Plan: Going to change his Medrol Dosepak to a 12-day tapering course of prednisone. We will keep him off work for the next week. He will let us know how things progress.

## 2022-07-11 NOTE — LETTER
[facility] [address]  [address] [place] [address] [address]  Phone: [phone]  Fax: [phone]    Julian Beck MD        July 11, 2022     Patient: Taj Abrams   YOB: 1955   Date of Visit: 7/11/2022       To Whom It May Concern: It is my medical opinion that Mahad Davenport should remain out of work until 7/19/2022 if he continues to experience pain. If you have any questions or concerns, please don't hesitate to call.     Sincerely,          Julian Beck MD

## 2022-07-15 ENCOUNTER — TELEPHONE (OUTPATIENT)
Dept: ORTHOPEDIC SURGERY | Age: 67
End: 2022-07-15

## 2022-07-15 DIAGNOSIS — M54.16 LEFT LUMBAR RADICULOPATHY: Primary | ICD-10-CM

## 2022-07-18 ENCOUNTER — TELEPHONE (OUTPATIENT)
Dept: ORTHOPEDIC SURGERY | Age: 67
End: 2022-07-18

## 2022-07-18 ENCOUNTER — TELEPHONE (OUTPATIENT)
Dept: FAMILY MEDICINE CLINIC | Age: 67
End: 2022-07-18

## 2022-07-18 DIAGNOSIS — F51.04 PSYCHOPHYSIOLOGICAL INSOMNIA: ICD-10-CM

## 2022-07-18 RX ORDER — ZOLPIDEM TARTRATE 10 MG/1
TABLET ORAL
Qty: 30 TABLET | Refills: 0 | Status: SHIPPED | OUTPATIENT
Start: 2022-07-18 | End: 2022-08-29 | Stop reason: SDUPTHER

## 2022-07-18 NOTE — LETTER
[facility] [address] [place] [address] Hilton Rae [address]  Phone: [phone]  Fax: [phone]    Brooke Pinto MD        July 18, 2022     Patient: Jaimee Summers   YOB: 1955   Date of Visit: 7/18/2022       To Whom It May Concern: It is my medical opinion that Mary Ann Show should remain out of work until 7-25-22. If you have any questions or concerns, please don't hesitate to call.     Sincerely,        Brooke Pinto MD

## 2022-07-18 NOTE — TELEPHONE ENCOUNTER
PT NEEDS A REFILL ON HIS ZOLPIDEM. IT WAS WRITTEN FOR 1/2 TABLET DAILY, BUT NEEDS THIS TO BE SENT TO TAKE 1 WHOLE TABLET DAILY. PATIENT IS GOING OUT OF TOWN THIS WEEKEND.  SC

## 2022-08-22 DIAGNOSIS — F51.04 PSYCHOPHYSIOLOGICAL INSOMNIA: ICD-10-CM

## 2022-08-22 RX ORDER — ATORVASTATIN CALCIUM 40 MG/1
TABLET, FILM COATED ORAL
Qty: 90 TABLET | Refills: 0 | Status: SHIPPED | OUTPATIENT
Start: 2022-08-22

## 2022-08-22 RX ORDER — ZOLPIDEM TARTRATE 10 MG/1
TABLET ORAL
Qty: 30 TABLET | Refills: 0 | OUTPATIENT
Start: 2022-08-22

## 2022-08-29 DIAGNOSIS — F51.04 PSYCHOPHYSIOLOGICAL INSOMNIA: ICD-10-CM

## 2022-08-29 RX ORDER — ZOLPIDEM TARTRATE 10 MG/1
TABLET ORAL
Qty: 30 TABLET | Refills: 0 | Status: SHIPPED | OUTPATIENT
Start: 2022-08-29 | End: 2022-09-27

## 2022-09-02 ENCOUNTER — TELEPHONE (OUTPATIENT)
Dept: FAMILY MEDICINE CLINIC | Age: 67
End: 2022-09-02

## 2022-09-02 NOTE — TELEPHONE ENCOUNTER
----- Message from Blanca Pratt sent at 9/2/2022 11:58 AM EDT -----  Subject: Message to Provider    QUESTIONS  Information for Provider? Pt needs a HNP needed at the time if his visit .    Pt will be having a procedure on 9.16.22   ---------------------------------------------------------------------------  --------------  Wander LIU  [phone]; OK to leave message on voicemail  ---------------------------------------------------------------------------  --------------  SCRIPT ANSWERS  undefined

## 2022-09-02 NOTE — TELEPHONE ENCOUNTER
CALLED AND SPOKE TO PATIENT TO LET HIM KNOW THAT DR. PARK AGREED TO DO HIS PRE OP ON THE 9TH, INSTRUCTED PATIENT TO BRING IN PAPERWORK WITH HIM, HE VOICED UNDERSTANDING.  SC

## 2022-09-02 NOTE — TELEPHONE ENCOUNTER
Block the 430 on 4/9/2022 and we can do a preop physical on patient at his visit IF his blood pressure is well controlled.

## 2022-09-09 ENCOUNTER — OFFICE VISIT (OUTPATIENT)
Dept: FAMILY MEDICINE CLINIC | Age: 67
End: 2022-09-09
Payer: COMMERCIAL

## 2022-09-09 VITALS
WEIGHT: 220 LBS | RESPIRATION RATE: 18 BRPM | HEIGHT: 72 IN | BODY MASS INDEX: 29.8 KG/M2 | DIASTOLIC BLOOD PRESSURE: 84 MMHG | OXYGEN SATURATION: 100 % | HEART RATE: 88 BPM | SYSTOLIC BLOOD PRESSURE: 136 MMHG | TEMPERATURE: 98.8 F

## 2022-09-09 DIAGNOSIS — E78.2 MIXED HYPERLIPIDEMIA: ICD-10-CM

## 2022-09-09 DIAGNOSIS — I25.10 CORONARY ARTERY DISEASE INVOLVING NATIVE CORONARY ARTERY OF NATIVE HEART WITHOUT ANGINA PECTORIS: ICD-10-CM

## 2022-09-09 DIAGNOSIS — Z01.818 PREOP GENERAL PHYSICAL EXAM: ICD-10-CM

## 2022-09-09 DIAGNOSIS — I49.3 FREQUENT PVCS: ICD-10-CM

## 2022-09-09 DIAGNOSIS — I10 ESSENTIAL HYPERTENSION: ICD-10-CM

## 2022-09-09 DIAGNOSIS — M51.16 LUMBAR DISC HERNIATION WITH RADICULOPATHY: Primary | ICD-10-CM

## 2022-09-09 DIAGNOSIS — I45.10 RIGHT BUNDLE BRANCH BLOCK: ICD-10-CM

## 2022-09-09 DIAGNOSIS — M33.90 DERMATOMYOSITIS (HCC): ICD-10-CM

## 2022-09-09 PROCEDURE — 99214 OFFICE O/P EST MOD 30 MIN: CPT | Performed by: FAMILY MEDICINE

## 2022-09-09 PROCEDURE — 93000 ELECTROCARDIOGRAM COMPLETE: CPT | Performed by: FAMILY MEDICINE

## 2022-09-09 NOTE — PATIENT INSTRUCTIONS
Alejandra Garcia was seen today for pre-op exam.    Diagnoses and all orders for this visit:    Lumbar disc herniation with radiculopathy  LEFT sciatica. Preop general physical exam  Await labs for surgical clearance. Essential hypertension  -     Good control.  -     Continue meds and lifestyle control. Mixed hyperlipidemia  -     Good control.  -     Continue meds and lifestyle control. Coronary artery disease involving native coronary artery of native heart without angina pectoris  -     EKG 12 Lead  -     Good control.  -     Continue meds and lifestyle control. Frequent PVCs  -     EKG 12 Lead  On Metoprolol. Right bundle branch block left axis -bifascicular block  -     EKG 12 Lead  Stable. Dermatomyositis ([address]  No skin problems. patient with planned surgery as above. Known risk factors for perioperative 75 y. o.complications: None      Current medications which may produce withdrawal symptoms if withheld perioperatively: None      Plan:     1. Preoperative workup as follows: per the surgeon - orders pending. 2. Change in medication regimen before surgery: discontinue NSAIDs (Ibuprofen) 7 days before surgery. 3. Prophylaxis for cardiac events with perioperative beta-blockers: on Metoprolol 50 mg 3x/day.   4. Deep vein thrombosis prophylaxis postoperatively: regimen to be chosen by surgical team.    Current Outpatient Medications   Medication Sig Dispense Refill    zolpidem (AMBIEN) 10 MG tablet TAKE 1 TABLET BY MOUTH EVERY EVENING AS NEEDED FOR SLEEP 30 tablet 0    atorvastatin (LIPITOR) 40 MG tablet TAKE 1 TABLET BY MOUTH ONE TIME A DAY 90 tablet 0    clomiPRAMINE (ANAFRANIL) 25 MG capsule TAKE THREE CAPSULES BY MOUTH EVERY EVENING 270 capsule 0    hydroCHLOROthiazide (HYDRODIURIL) 25 MG tablet TAKE 1 TABLET BY MOUTH ONE TIME A DAY **NEED APPOINTMENT FOR FURTHER REFILLS** 90 tablet 1    fluticasone (FLONASE) 50 MCG/ACT nasal spray 1 spray by Each Nostril route daily 32 g 1    metoprolol tartrate (LOPRESSOR) 50 MG tablet TAKE 1 TABLET BY MOUTH 3 TIMES A  tablet 1    folic acid (FOLVITE) 1 MG tablet TAKE 1 TABLET BY MOUTH ONE TIME A DAY 90 tablet 0    clobetasol (TEMOVATE) 0.05 % cream Apply topically 2 times daily Apply topically 2 times daily. 120 g 2    sildenafil (REVATIO) 20 MG tablet Take 1-5 tablets by mouth as needed  3    thyroid (ARMOUR) 130 MG tablet Take 130 mg by mouth daily      Testosterone 20 % CREA by Does not apply route. [place] Stands ibuprofen (ADVIL;MOTRIN) 200 MG tablet Take 200 mg by mouth daily Indications: 800 MG TWICE DAILY       Cholecalciferol (VITAMIN D3) 5000 units TABS Take 1 tablet by mouth every other day       Nutritional Supplements (DHEA PO) Take 35 mg by mouth daily     Take bold type meds with sips of water am of surgery.

## 2022-09-09 NOTE — PROGRESS NOTES
40 MG tablet TAKE 1 TABLET BY MOUTH ONE TIME A DAY 90 tablet 0    clomiPRAMINE (ANAFRANIL) 25 MG capsule TAKE THREE CAPSULES BY MOUTH EVERY EVENING 270 capsule 0    hydroCHLOROthiazide (HYDRODIURIL) 25 MG tablet TAKE 1 TABLET BY MOUTH ONE TIME A DAY **NEED APPOINTMENT FOR FURTHER REFILLS** 90 tablet 1    fluticasone (FLONASE) 50 MCG/ACT nasal spray 1 spray by Each Nostril route daily 32 g 1    metoprolol tartrate (LOPRESSOR) 50 MG tablet TAKE 1 TABLET BY MOUTH 3 TIMES A  tablet 1    folic acid (FOLVITE) 1 MG tablet TAKE 1 TABLET BY MOUTH ONE TIME A DAY 90 tablet 0    clobetasol (TEMOVATE) 0.05 % cream Apply topically 2 times daily Apply topically 2 times daily. 120 g 2    sildenafil (REVATIO) 20 MG tablet Take 1-5 tablets by mouth as needed  3    thyroid (ARMOUR) 130 MG tablet Take 130 mg by mouth daily      Testosterone 20 % CREA by Does not apply route. Giovanna Red ibuprofen (ADVIL;MOTRIN) 200 MG tablet Take 200 mg by mouth daily Indications: 800 MG TWICE DAILY       Cholecalciferol (VITAMIN D3) 5000 units TABS Take 1 tablet by mouth every other day       Nutritional Supplements (DHEA PO) Take 35 mg by mouth daily     Take bold type meds with sips of water am of surgery. No follow-ups on file.        Subjective   SUBJECTIVE/OBJECTIVE:  Chief Complaint   Patient presents with    Pre-op Exam     Pre op physical laminectomy and diskectomy on 9/19 with mcphearson  labs will be done on 9/13 at the hospital ekg done here    [address] [place] [address], [facility] [address]  [phone]    Gaby Ivette  YOB: 1955    Vitals:    09/09/22 1555   BP: 136/84   Site: Right Upper Arm   Position: Sitting   Cuff Size: Medium Adult   Pulse: 88   Resp: 18   Temp: 98.8 °F (37.1 °C)   TempSrc: Infrared   SpO2: 100%   Weight: 220 lb (99.8 kg)   Height: 6' (1.829 m)     BP Readings from Last 3 Encounters:   09/09/22 136/84   07/08/22 (!) 163/99   04/14/22 (!) 150/90     Pulse Readings from Last 3 Encounters:   09/09/22 88   07/08/22 82   04/14/22 78     Wt Readings from Last 3 Encounters:   09/09/22 220 lb (99.8 kg)   07/11/22 220 lb (99.8 kg)   04/21/22 220 lb 9.6 oz (100.1 kg)     Body mass index is 29.84 kg/m². Chief Complaint   Patient presents with    Pre-op Exam     Pre op physical laminectomy and diskectomy on 9/19 with mcphearson  labs will be done on 9/13 at the hospital ekg done here      No Known Allergies  Current Outpatient Medications   Medication Sig Dispense Refill    zolpidem (AMBIEN) 10 MG tablet TAKE 1 TABLET BY MOUTH EVERY EVENING AS NEEDED FOR SLEEP 30 tablet 0    atorvastatin (LIPITOR) 40 MG tablet TAKE 1 TABLET BY MOUTH ONE TIME A DAY 90 tablet 0    clomiPRAMINE (ANAFRANIL) 25 MG capsule TAKE THREE CAPSULES BY MOUTH EVERY EVENING 270 capsule 0    hydroCHLOROthiazide (HYDRODIURIL) 25 MG tablet TAKE 1 TABLET BY MOUTH ONE TIME A DAY **NEED APPOINTMENT FOR FURTHER REFILLS** 90 tablet 1    fluticasone (FLONASE) 50 MCG/ACT nasal spray 1 spray by Each Nostril route daily 32 g 1    metoprolol tartrate (LOPRESSOR) 50 MG tablet TAKE 1 TABLET BY MOUTH 3 TIMES A  tablet 1    folic acid (FOLVITE) 1 MG tablet TAKE 1 TABLET BY MOUTH ONE TIME A DAY 90 tablet 0    clobetasol (TEMOVATE) 0.05 % cream Apply topically 2 times daily Apply topically 2 times daily. 120 g 2    sildenafil (REVATIO) 20 MG tablet Take 1-5 tablets by mouth as needed  3    thyroid (ARMOUR) 130 MG tablet Take 130 mg by mouth daily      Testosterone 20 % CREA by Does not apply route. Hailey Branham ibuprofen (ADVIL;MOTRIN) 200 MG tablet Take 200 mg by mouth daily Indications: 800 MG TWICE DAILY       Cholecalciferol (VITAMIN D3) 5000 units TABS Take 1 tablet by mouth every other day       Nutritional Supplements (DHEA PO) Take 35 mg by mouth daily       No current facility-administered medications for this visit.        This patient presents to the office today for a preoperative consultation at the request of surgeon, Dr Ernie Wells who plans on performing LEFT L3-4 Microdiscectomy/Laminectomy on September 9, 2022. The current problem began 6 weeks ago and has slightly improved. Conservative therapy, including started with medrol dose pack, high dose steroids, epidural steroids has failed. Planned anesthesia is General.  The patient has no known anesthesia issues in self nor family. Patient has no bleeding risk in self nor family. No clotting disorders/incidents in self nor family. Patient does not have objection to receiving blood products if needed. Patient Active Problem List   Diagnosis    Hypertension    Hyperlipidemia    Dermatomyositis ([facility] [address]    Insomnia    Anxiety    Dysfunction of eustachian tube    History of lumbar laminectomy    Abnormal EKG    Coronary artery disease involving native coronary artery of native heart without angina pectoris    H/O angiography    Right bundle branch block left axis -bifascicular block    Homocysteinemia    Frequent PVCs    Testosterone deficiency       Past Medical History:   Diagnosis Date    Anxiety     BPH (benign prostatic hyperplasia) 2016    Chronic back pain     Colon polyps 2005    Coronary atherosclerosis 05/10/2019    cath, LAD had 20-25% narrowing but required no intervention other than maximizing medical tx. COVID-19 virus infection     Dermatomyositis ([facility] [address]     ED (erectile dysfunction)     History of skin cancer     Homocysteinemia 01/07/2021    Homocystine 20 on 1/7/21. Hyperlipidemia     Hypertension 2008    Hypothyroidism     Insomnia     Osteoarthritis     Right bundle branch block left axis -bifascicular block 03/07/2019    -Right bundle branch block with .     Spinal cord tumor 2011    Testosterone deficiency     Wears glasses      Past Surgical History:   Procedure Laterality Date    CARDIAC CATHETERIZATION  05/07/2019    COLONOSCOPY  09/13/2012    Dr. Yamileth Muñiz    COLONOSCOPY  04/16/2005     Sophia, adenomatous polyps    COLONOSCOPY  2009    Dr. Jolene Byers    COLONOSCOPY  2015    Dr. Jolene Byers, recheck 2020    INNER EAR SURGERY Bilateral 3/29/2022    BILATERAL EUSTACHIAN TUBE DILATION RIGHT EAR EXAM WITH REMOVAL OF RETAINED EAR TUBE performed by Dasha Jackson MD at [facility]      L1-L2    NASAL [address] BIOPSY      Spine:  myxopapillary ependymoma     PROSTATE SURGERY N/A 2021    UROLIFT at urology [facility]. Dr Dayton Bunn. TYMPANOSTOMY TUBE PLACEMENT Bilateral     UPPER GASTROINTESTINAL ENDOSCOPY  2012    Dr. Jolene Byers     Family History   Problem Relation Age of Onset    Diabetes Mother     Breast Cancer Mother     Cancer Mother         bladder    Coronary Art Dis Father     Colon Cancer Father     Heart Surgery Brother 54        CABG    Coronary Art Dis Brother 54        Stent @70    High Cholesterol Brother     No Known Problems Brother     Depression Daughter     Anxiety Disorder Daughter     Eczema Daughter     Depression Son     Anxiety Disorder Son     Asthma Son      Social History     Socioeconomic History    Marital status:      Spouse name: Not on file    Number of children: 4    Years of education: Not on file    Highest education level: Not on file   Occupational History    Occupation: Surgical asst     Employer: [employer]     Comment: 24 hr//wk   Tobacco Use    Smoking status: Former     Packs/day: 1.00     Years: 33.00     Pack years: 33.00     Types: Cigarettes     Start date: 1972     Quit date:      Years since quittin.6    Smokeless tobacco: Never   Vaping Use    Vaping Use: Never used   Substance and Sexual Activity    Alcohol use: Yes     Alcohol/week: 5.0 standard drinks     Types: 5 Shots of liquor per week     Comment: OCCASIONAL.      Drug use: Never    Sexual activity: Yes     Partners: Female   Other Topics Concern    Not on file   Social History Narrative    Swim weekly 1 hr, bike 1/2 hr 2x/wk, weights 1x/wk x 45 min. Walking 3 mi 2x/wk. 12/3/20. Biking 2-3x/wk for 8 mi. [employer] weights weekly for 1 hr. Then walking when able. 6/14/21. Stationary bike 30 2x/wk. Walking 1 mi 3x/wk if not too cold. Weights 2x/wk 30 min. 1/13/22.3/24/22. Less exercise with sciatica. Social Determinants of Health     Financial Resource Strain: Not on file   Food Insecurity: Not on file   Transportation Needs: Not on file   Physical Activity: Not on file   Stress: Not on file   Social Connections: Not on file   Intimate Partner Violence: Not on file   Housing Stability: Not on file     Review of Systems Review of systems in history of present illness or scanned in under media tab. Objective   Vitals:    09/09/22 1555   BP: 136/84   Site: Right Upper Arm   Position: Sitting   Cuff Size: Medium Adult   Pulse: 88   Resp: 18   Temp: 98.8 °F (37.1 °C)   TempSrc: Infrared   SpO2: 100%   Weight: 220 lb (99.8 kg)   Height: 6' (1.829 m)     BP Readings from Last 3 Encounters:   09/09/22 136/84   07/08/22 (!) 163/99   04/14/22 (!) 150/90     Pulse Readings from Last 3 Encounters:   09/09/22 88   07/08/22 82   04/14/22 78     Wt Readings from Last 3 Encounters:   09/09/22 220 lb (99.8 kg)   07/11/22 220 lb (99.8 kg)   04/21/22 220 lb 9.6 oz (100.1 kg)     Body mass index is 29.84 kg/m². Physical Exam  Vitals and nursing note reviewed. Constitutional:       General: He is not in acute distress. Appearance: Normal appearance. He is well-developed. He is not diaphoretic. HENT:      Right Ear: Tympanic membrane, ear canal and external ear normal. No middle ear effusion. Tympanic membrane is not retracted or bulging. Left Ear: Tympanic membrane, ear canal and external ear normal.  No middle ear effusion. Tympanic membrane is not retracted or bulging. Nose: Nose normal. No mucosal edema or rhinorrhea. Mouth/Throat:      Mouth: Mucous membranes are not pale, not dry and not cyanotic.  No oral lesions. Dentition: No dental caries. Pharynx: Uvula midline. No oropharyngeal exudate, posterior oropharyngeal erythema or uvula swelling. Tonsils: No tonsillar abscesses. Eyes:      General: No scleral icterus. Right eye: No discharge. Left eye: No discharge. Conjunctiva/sclera: Conjunctivae normal.      Right eye: Right conjunctiva is not injected. No exudate. Left eye: Left conjunctiva is not injected. No exudate. Pupils: Pupils are equal, round, and reactive to light. Neck:      Thyroid: No thyroid mass or thyromegaly. Trachea: Trachea and phonation normal. No tracheal tenderness or tracheal deviation. Cardiovascular:      Rate and Rhythm: Normal rate and regular rhythm. No extrasystoles are present. Heart sounds: S1 normal. Heart sounds not distant. No murmur heard. No friction rub. No gallop. No S3 or S4 sounds. Pulmonary:      Effort: Pulmonary effort is normal. No respiratory distress. Breath sounds: Normal breath sounds. No stridor. No decreased breath sounds, wheezing, rhonchi or rales. Chest:   Breasts:     Right: No supraclavicular adenopathy. Left: No supraclavicular adenopathy. Abdominal:      General: Abdomen is protuberant. There is no distension. Palpations: Abdomen is not rigid. There is no hepatomegaly, splenomegaly, mass or pulsatile mass. Tenderness: There is no abdominal tenderness. There is no right CVA tenderness, left CVA tenderness, guarding or rebound. Negative signs include Greene's sign and McBurney's sign. Musculoskeletal:      Cervical back: Neck supple. Right lower leg: No edema. Left lower leg: No edema. Lymphadenopathy:      Head:      Right side of head: No submandibular or tonsillar adenopathy. Left side of head: No submandibular or tonsillar adenopathy. Cervical: No cervical adenopathy. Right cervical: No superficial, deep or posterior cervical adenopathy. Left cervical: No superficial, deep or posterior cervical adenopathy. Upper Body:      Right upper body: No supraclavicular or pectoral adenopathy. Left upper body: No supraclavicular or pectoral adenopathy. Skin:     General: Skin is warm and dry. Coloration: Skin is not pale. Findings: No lesion. Nails: There is no clubbing. Neurological:      Mental Status: He is alert. Motor: No tremor or abnormal muscle tone. Coordination: Coordination normal.      Gait: Gait normal.      Deep Tendon Reflexes:      Reflex Scores:       Patellar reflexes are 2+ on the right side and 2+ on the left side. Psychiatric:         Attention and Perception: Attention and perception normal.         Mood and Affect: Mood and affect normal.         Speech: Speech normal.         Behavior: Behavior normal. Behavior is cooperative. Cognition and Memory: Cognition and memory normal.         Judgment: Judgment normal.     Lab Review    Labs not done. Called patient on 9/18/2022. Surgery is being canceled because patient is doing better. EKG 9/9/2022 4:06 PM  Sinus  Rhythm  - frequent ectopic ventricular beat s   # VECs = 2  -Right bundle branch block with left axis -bifascicular block. ABNORMAL        On this date 9/9/2022 I have spent 62 minutes reviewing previous notes, test results and face to face with the patient discussing the diagnosis and importance of compliance with the treatment plan as well as documenting on the day of the visit. An electronic signature was used to authenticate this note.     --Rachel Blackman DO

## 2022-09-12 ENCOUNTER — TELEPHONE (OUTPATIENT)
Dept: CARDIOLOGY CLINIC | Age: 67
End: 2022-09-12

## 2022-09-12 NOTE — TELEPHONE ENCOUNTER
Pt called stating he needs cardiac clearance. He is having Left L3-4 Laminectomy and Discectomy on 9/19/22 at [facility]. Surgeon is Dr. Diamond Lemons. He would like call back about this at [phone].

## 2022-09-27 DIAGNOSIS — I10 ESSENTIAL HYPERTENSION: Chronic | ICD-10-CM

## 2022-09-27 DIAGNOSIS — I25.10 CORONARY ARTERY DISEASE INVOLVING NATIVE CORONARY ARTERY OF NATIVE HEART WITHOUT ANGINA PECTORIS: Chronic | ICD-10-CM

## 2022-09-27 DIAGNOSIS — F51.04 PSYCHOPHYSIOLOGICAL INSOMNIA: ICD-10-CM

## 2022-09-27 RX ORDER — ZOLPIDEM TARTRATE 10 MG/1
TABLET ORAL
Qty: 30 TABLET | Refills: 5 | Status: SHIPPED | OUTPATIENT
Start: 2022-09-27 | End: 2023-03-26

## 2022-09-27 RX ORDER — METOPROLOL TARTRATE 50 MG/1
TABLET, FILM COATED ORAL
Qty: 270 TABLET | Refills: 0 | Status: SHIPPED | OUTPATIENT
Start: 2022-09-27

## 2022-11-09 RX ORDER — HYDROCHLOROTHIAZIDE 25 MG/1
TABLET ORAL
Qty: 30 TABLET | Refills: 0 | Status: SHIPPED | OUTPATIENT
Start: 2022-11-09

## 2022-11-28 RX ORDER — CLOMIPRAMINE HYDROCHLORIDE 25 MG/1
CAPSULE ORAL
Qty: 270 CAPSULE | Refills: 0 | Status: SHIPPED | OUTPATIENT
Start: 2022-11-28

## 2022-11-28 RX ORDER — ATORVASTATIN CALCIUM 40 MG/1
TABLET, FILM COATED ORAL
Qty: 90 TABLET | Refills: 0 | Status: SHIPPED | OUTPATIENT
Start: 2022-11-28

## 2022-12-19 RX ORDER — HYDROCHLOROTHIAZIDE 25 MG/1
TABLET ORAL
Qty: 15 TABLET | Refills: 0 | Status: SHIPPED | OUTPATIENT
Start: 2022-12-19

## 2022-12-23 ENCOUNTER — TELEMEDICINE (OUTPATIENT)
Dept: FAMILY MEDICINE CLINIC | Age: 67
End: 2022-12-23
Payer: COMMERCIAL

## 2022-12-23 DIAGNOSIS — J40 BRONCHITIS: Primary | ICD-10-CM

## 2022-12-23 PROCEDURE — 99213 OFFICE O/P EST LOW 20 MIN: CPT | Performed by: NURSE PRACTITIONER

## 2022-12-23 PROCEDURE — 1123F ACP DISCUSS/DSCN MKR DOCD: CPT | Performed by: NURSE PRACTITIONER

## 2022-12-23 RX ORDER — AZITHROMYCIN 250 MG/1
250 TABLET, FILM COATED ORAL SEE ADMIN INSTRUCTIONS
Qty: 6 TABLET | Refills: 0 | Status: SHIPPED | OUTPATIENT
Start: 2022-12-23 | End: 2022-12-28

## 2022-12-23 ASSESSMENT — ENCOUNTER SYMPTOMS
WHEEZING: 0
SHORTNESS OF BREATH: 0
RHINORRHEA: 1
COUGH: 1
SORE THROAT: 1
SINUS PRESSURE: 0
SINUS PAIN: 0

## 2022-12-23 NOTE — PROGRESS NOTES
Linda Salas (:  1955) is a 75 y.o. male,Established patient, here for evaluation of the following chief complaint(s): Cough (COUGH/ COLD SYMPTOMS )      ASSESSMENT/PLAN:  1. Bronchitis  -Viral URI versus bacterial sinusitis versus bronchitis versus pneumonia versus COVID-19 infection versus influenza. The patient is outside the window for treatment with antivirals for influenza and COVID-19. Given that it is getting close to the holiday, will prescribe a Z-Jesse. Educated on the medication use as well as side effects. Can continue over-the-counter remedies. Follow-up as needed. -     azithromycin (ZITHROMAX) 250 MG tablet; Take 1 tablet by mouth See Admin Instructions for 5 days 500mg on day 1 followed by 250mg on days 2 - 5, Disp-6 tablet, R-0Normal    Return if symptoms worsen or fail to improve. SUBJECTIVE/OBJECTIVE:  QUINN Roque presents today virtually regarding cough and congestion. He states he has had symptoms since last Saturday. He works in the [employer] at [employer], so he states he has been unable to wipe his congestion a lot of the time because he has to keep a mask on during a procedure. He felt like over the past couple days he started developing thicker mucus that was turning yellow. Denies any fevers. He notes to that when he exhales he is starting to experience more crackles. Was inquiring whether an antibiotic would be needed since symptoms have been going on for almost a week. Denies any shortness of breath. Denies any sinus pressure or ear pressure. He has been trying over-the-counter decongestants including DayQuil, NyQuil, and cough medication without alleviation of symptoms. Review of Systems   Constitutional:  Negative for chills, fatigue and fever. HENT:  Positive for congestion, postnasal drip, rhinorrhea and sore throat. Negative for sinus pressure and sinus pain. Respiratory:  Positive for cough. Negative for shortness of breath and wheezing. Cardiovascular:  Negative for chest pain, palpitations and leg swelling. Neurological:  Negative for dizziness, weakness, light-headedness, numbness and headaches. Psychiatric/Behavioral:  Negative for decreased concentration, dysphoric mood, self-injury, sleep disturbance and suicidal ideas. The patient is not nervous/anxious. No flowsheet data found. Physical Exam  Constitutional:       General: He is not in acute distress. Appearance: Normal appearance. He is normal weight. HENT:      Nose: Congestion and rhinorrhea present. Mouth/Throat:      Comments: Unable to assess via virtual.  Hoarse voice. Pulmonary:      Effort: Pulmonary effort is normal.      Comments: Persistent congested cough during visit. Neurological:      Mental Status: He is alert and oriented to person, place, and time. Psychiatric:         Mood and Affect: Mood normal.         Behavior: Behavior normal.         Thought Content: Thought content normal.         Judgment: Judgment normal.           On this date 12/23/2022 I have spent 20 minutes reviewing previous notes, test results and face to face (virtual) with the patient discussing the diagnosis and importance of compliance with the treatment plan as well as documenting on the day of the visitNolan Dickinson, was evaluated through a synchronous (real-time) audio-video encounter. The patient (or guardian if applicable) is aware that this is a billable service. Verbal consent to proceed has been obtained within the past 12 months. The visit was conducted pursuant to the emergency declaration under the [facility], [address] [place] [address] authority and the [employer] and [employer] General Act. Patient identification was verified, and a caregiver was present when appropriate. The patient was located in a state where the provider was credentialed to provide care.     This dictation was generated by voice recognition computer software. Although all attempts are made to edit the dictation for accuracy, there may be errors in the transcription that are not intended. An electronic signature was used to authenticate this note.     --SAMPSON Joyner - CNP

## 2023-01-09 DIAGNOSIS — I25.10 CORONARY ARTERY DISEASE INVOLVING NATIVE CORONARY ARTERY OF NATIVE HEART WITHOUT ANGINA PECTORIS: Chronic | ICD-10-CM

## 2023-01-09 DIAGNOSIS — I10 ESSENTIAL HYPERTENSION: Chronic | ICD-10-CM

## 2023-01-09 RX ORDER — HYDROCHLOROTHIAZIDE 25 MG/1
TABLET ORAL
Qty: 15 TABLET | Refills: 0 | Status: SHIPPED | OUTPATIENT
Start: 2023-01-09

## 2023-01-09 RX ORDER — METOPROLOL TARTRATE 50 MG/1
TABLET, FILM COATED ORAL
Qty: 270 TABLET | Refills: 0 | Status: SHIPPED | OUTPATIENT
Start: 2023-01-09

## 2023-01-25 DIAGNOSIS — M54.16 LEFT LUMBAR RADICULOPATHY: Primary | ICD-10-CM

## 2023-01-25 RX ORDER — METHYLPREDNISOLONE 4 MG/1
TABLET ORAL
Qty: 1 KIT | Refills: 0 | Status: SHIPPED | OUTPATIENT
Start: 2023-01-25

## 2023-01-26 ENCOUNTER — OFFICE VISIT (OUTPATIENT)
Dept: CARDIOLOGY CLINIC | Age: 68
End: 2023-01-26
Payer: COMMERCIAL

## 2023-01-26 VITALS
SYSTOLIC BLOOD PRESSURE: 150 MMHG | BODY MASS INDEX: 29.93 KG/M2 | DIASTOLIC BLOOD PRESSURE: 80 MMHG | HEART RATE: 80 BPM | WEIGHT: 221 LBS | HEIGHT: 72 IN

## 2023-01-26 DIAGNOSIS — E78.2 MIXED HYPERLIPIDEMIA: Primary | ICD-10-CM

## 2023-01-26 DIAGNOSIS — I25.10 CORONARY ARTERY DISEASE INVOLVING NATIVE CORONARY ARTERY OF NATIVE HEART WITHOUT ANGINA PECTORIS: ICD-10-CM

## 2023-01-26 PROCEDURE — 3078F DIAST BP <80 MM HG: CPT | Performed by: INTERNAL MEDICINE

## 2023-01-26 PROCEDURE — 3074F SYST BP LT 130 MM HG: CPT | Performed by: INTERNAL MEDICINE

## 2023-01-26 PROCEDURE — 1123F ACP DISCUSS/DSCN MKR DOCD: CPT | Performed by: INTERNAL MEDICINE

## 2023-01-26 PROCEDURE — 99214 OFFICE O/P EST MOD 30 MIN: CPT | Performed by: INTERNAL MEDICINE

## 2023-01-26 NOTE — PROGRESS NOTES
[address]   Dr Olesya Salas. Alize Rivas MD, [facility]    Outpatient Follow Up Note    1/26/2023,12:30 PM  Subjective:   [facility]:  Follow Up secondary to hypertension and chest pains     Precious Menjivar is 75 y.o. male who presents today for a routine follow up. Partially retired working 3 days/week. Clinically doing great. No chest pains or shortness of breath or dyspnea. 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 Did have surgery to remove it and is clinically doing well and having less back pain. Did have some azotemia with a creatinine of 1.4 last year he thinks it was because he was relatively dehydrated because while he is doing his surgeries he does not want to drink I had to go to the bathroom. Otherwise he is doing well. He is on LDL 88 on Lipitor 40. No need to do another cardiac cath at this time. Regarding what he has called hemoconcentration with an elevated H&H in April 2022 I think there is a need for another CBC. We will obtain a CBC and a CMP and lipid profile. Renew his hydrochlorothiazide. CAD cath 2019 with 20 to 30% mid LAD  Hypertension  Hyperlipidemia LDL 88 on current therapy  Hemoconcentration with H&H of 20.3 and 59. Thinks that it may be related to him being relatively dry  CKD with creatinine 1.4  past Medical History:    Past Medical History:   Diagnosis Date    Anxiety     BPH (benign prostatic hyperplasia) 2016    Chronic back pain     Colon polyps 2005    Coronary atherosclerosis 05/10/2019    cath, LAD had 20-25% narrowing but required no intervention other than maximizing medical tx. COVID-19 virus infection     Dermatomyositis ([facility] [address]     ED (erectile dysfunction)     History of skin cancer     Homocysteinemia 01/07/2021    Homocystine 20 on 1/7/21.     Hyperlipidemia     Hypertension 2008    Hypothyroidism     Insomnia     Osteoarthritis     Right bundle branch block left axis -bifascicular block 03/07/2019 -Right bundle branch block with . Spinal cord tumor     Testosterone deficiency     Wears glasses      Past Surgical History  Past Surgical History:   Procedure Laterality Date    CARDIAC CATHETERIZATION  2019    COLONOSCOPY  2012    Dr. Sana Causey    COLONOSCOPY  2005    Dr. Sana Causey, adenomatous polyps    COLONOSCOPY  2009    Dr. Sana Causey    COLONOSCOPY  2015    Dr. Sana Causey, recheck 2020    INNER EAR SURGERY Bilateral 3/29/2022    BILATERAL EUSTACHIAN TUBE DILATION RIGHT EAR EXAM WITH REMOVAL OF RETAINED EAR TUBE performed by Hans Mcbride MD at [facility]      L1-L2    NASAL [address] BIOPSY      Spine:  myxopapillary ependymoma     PROSTATE SURGERY N/A 2021    UROLIFT at urology [facility]. Dr Lillie Nuñez. TYMPANOSTOMY TUBE PLACEMENT Bilateral 2017    UPPER GASTROINTESTINAL ENDOSCOPY  2012    Dr. Sana Causey     Social History:       Social History     Tobacco Use   Smoking Status Former    Packs/day: 1.00    Years: 33.00    Pack years: 33.00    Types: Cigarettes    Start date: 1972    Quit date:     Years since quittin.0   Smokeless Tobacco Never     Current Medications:  Prior to Visit Medications    Medication Sig Taking? Authorizing Provider   methylPREDNISolone (MEDROL, FELICIA,) 4 MG tablet Take by mouth.  Yes Tavon Evans MD   hydroCHLOROthiazide (HYDRODIURIL) 25 MG tablet TAKE ONE TABLET BY MOUTH ONE TIME A DAY **NEED APPOINTMENT FOR FURTHER FILLS** Yes SAMPSON Choi - ELIZABETH   metoprolol tartrate (LOPRESSOR) 50 MG tablet TAKE 1 TABLET BY MOUTH 3 TIMES A DAY Yes Pedro Dias DO   clomiPRAMINE (ANAFRANIL) 25 MG capsule TAKE THREE CAPSULES BY MOUTH EVERY EVENING Yes SAMPSON Bethea CNP   atorvastatin (LIPITOR) 40 MG tablet TAKE 1 TABLET BY MOUTH ONE TIME DAILY Yes SAMPSON Walter CNP   zolpidem (AMBIEN) 10 MG tablet TAKE 1 TABLET BY MOUTH EVERY NIGHT AT BEDTIME AS NEEDED FOR SLEEP Yes Arthurine Orchard, DO   fluticasone (FLONASE) 50 MCG/ACT nasal spray 1 spray by Each Nostril route daily Yes Rodney Hernandez MD   folic acid (FOLVITE) 1 MG tablet TAKE 1 TABLET BY MOUTH ONE TIME A DAY Yes Pedro Dias, DO   clobetasol (TEMOVATE) 0.05 % cream Apply topically 2 times daily Apply topically 2 times daily. Yes Arthurine Orchard, DO   sildenafil (REVATIO) 20 MG tablet Take 1-5 tablets by mouth as needed Yes Historical Provider, MD   thyroid (ARMOUR) 130 MG tablet Take 130 mg by mouth daily Yes Historical Provider, MD   Testosterone 20 % CREA by Does not apply route. . Yes Historical Provider, MD   ibuprofen (ADVIL;MOTRIN) 200 MG tablet Take 200 mg by mouth daily Indications: 800 MG TWICE DAILY  Yes Historical Provider, MD   Cholecalciferol (VITAMIN D3) 5000 units TABS Take 1 tablet by mouth every other day  Yes Historical Provider, MD   Nutritional Supplements (DHEA PO) Take 35 mg by mouth daily Yes Historical Provider, MD     Family History  Family History   Problem Relation Age of Onset    Diabetes Mother     Breast Cancer Mother     Cancer Mother         bladder    Coronary Art Dis Father     Colon Cancer Father     Heart Surgery Brother 54        CABG    Coronary Art Dis Brother 54        Stent @70    High Cholesterol Brother     No Known Problems Brother     Depression Daughter     Anxiety Disorder Daughter     Eczema Daughter     Depression Son     Anxiety Disorder Son     Asthma Son        Current Medications  Current Outpatient Medications   Medication Sig Dispense Refill    methylPREDNISolone (MEDROL, FELICIA,) 4 MG tablet Take by mouth.  1 kit 0    hydroCHLOROthiazide (HYDRODIURIL) 25 MG tablet TAKE ONE TABLET BY MOUTH ONE TIME A DAY **NEED APPOINTMENT FOR FURTHER FILLS** 15 tablet 0    metoprolol tartrate (LOPRESSOR) 50 MG tablet TAKE 1 TABLET BY MOUTH 3 TIMES A  tablet 0    clomiPRAMINE (ANAFRANIL) 25 MG capsule TAKE THREE CAPSULES BY MOUTH EVERY EVENING 270 capsule 0 atorvastatin (LIPITOR) 40 MG tablet TAKE 1 TABLET BY MOUTH ONE TIME DAILY 90 tablet 0    zolpidem (AMBIEN) 10 MG tablet TAKE 1 TABLET BY MOUTH EVERY NIGHT AT BEDTIME AS NEEDED FOR SLEEP 30 tablet 5    fluticasone (FLONASE) 50 MCG/ACT nasal spray 1 spray by Each Nostril route daily 32 g 1    folic acid (FOLVITE) 1 MG tablet TAKE 1 TABLET BY MOUTH ONE TIME A DAY 90 tablet 0    clobetasol (TEMOVATE) 0.05 % cream Apply topically 2 times daily Apply topically 2 times daily. 120 g 2    sildenafil (REVATIO) 20 MG tablet Take 1-5 tablets by mouth as needed  3    thyroid (ARMOUR) 130 MG tablet Take 130 mg by mouth daily      Testosterone 20 % CREA by Does not apply route. Rohith Glenn ibuprofen (ADVIL;MOTRIN) 200 MG tablet Take 200 mg by mouth daily Indications: 800 MG TWICE DAILY       Cholecalciferol (VITAMIN D3) 5000 units TABS Take 1 tablet by mouth every other day       Nutritional Supplements (DHEA PO) Take 35 mg by mouth daily       No current facility-administered medications for this visit. REVIEW OF SYSTEMS:    CONSTITUTIONAL: No major weight gain or loss, fatigue, weakness, night sweats or fever. HEENT: No new vision difficulties or ringing in the ears. RESPIRATORY: No new SOB, PND, orthopnea or cough. CARDIOVASCULAR: See HPI  GI: No nausea, vomiting, diarrhea, constipation, abdominal pain or changes in bowel habits. : No urinary frequency, urgency, incontinence hematuria or dysuria. SKIN: No cyanosis or skin lesions. MUSCULOSKELETAL: No new muscle or joint pain. NEUROLOGICAL: No syncope or TIA-like symptoms.   PSYCHIATRIC: No anxiety, pain, insomnia or depression    Objective:   PHYSICAL EXAM:        VITALS:    Wt Readings from Last 3 Encounters:   01/26/23 221 lb (100.2 kg)   09/09/22 220 lb (99.8 kg)   07/11/22 220 lb (99.8 kg)     BP Readings from Last 3 Encounters:   01/26/23 (!) 150/80   09/09/22 136/84   07/08/22 (!) 163/99     Pulse Readings from Last 3 Encounters:   01/26/23 80   09/09/22 88 07/08/22 82       CONSTITUTIONAL: Cooperative, no apparent distress, and appears well nourished / developed  NEUROLOGIC:  Awake and orientated to person, place and time. PSYCH: Calm affect. SKIN: Warm and dry. HEENT: Sclera non-icteric, normocephalic, neck supple, no elevation of JVP, normal carotid pulses with no bruits and thyroid normal size. LUNGS:  No increased work of breathing and clear to auscultation, no crackles or wheezing  CARDIOVASCULAR:  Regular rate and rhythm with no murmurs, gallops, rubs, or abnormal heart sounds, normal PMI. The apical impulses not displaced  Heart tones are crisp and normal  Cervical veins are not engorged  The carotid upstroke is normal in amplitude and contour without delay or bruit  JVP is not elevated  ABDOMEN:  Normal bowel sounds, non-distended and non-tender to palpation  EXT: No edema, no calf tenderness. Pulses are present bilaterally.     DATA:    Lab Results   Component Value Date    ALT 34 03/24/2022    AST 26 03/24/2022    ALKPHOS 84 03/24/2022    BILITOT 0.7 03/24/2022     Lab Results   Component Value Date    CREATININE 1.4 (H) 04/03/2022    BUN 17 04/03/2022     04/03/2022    K 3.4 (L) 04/03/2022    CL 94 (L) 04/03/2022    CO2 27 04/03/2022     No results found for: TSH, Z2MAOPM, S4PGQVK, THYROIDAB  Lab Results   Component Value Date    WBC 8.2 04/03/2022    HGB 20.3 (HH) 04/03/2022    HCT 59.1 (H) 04/03/2022    MCV 89.1 04/03/2022     04/03/2022     No components found for: CHLPL  Lab Results   Component Value Date    TRIG 124 01/20/2022    TRIG 94 01/25/2019    TRIG 220 (H) 05/08/2018     Lab Results   Component Value Date    HDL 38 (L) 01/20/2022    HDL 22 (L) 01/07/2021    HDL 39 (L) 01/25/2019     Lab Results   Component Value Date    LDLCALC 88 01/20/2022    LDLCALC 80 01/07/2021    LDLCALC 61 01/25/2019     Lab Results   Component Value Date    LABVLDL 25 01/20/2022    LABVLDL 29 01/07/2021    LABVLDL 19 01/25/2019     Radiology Review: Pertinent images / reports were reviewed as a part of this visit and reveals the following:    Echo:Summary 5/4/2019   Left ventricular cavity size is normal. There is mildly increased left   ventricular wall thickness. Ejection fraction is visually estimated to be   55-60%. Normal diastolic function. Mitral annular calcification is present. Stress Test / Angiogram:    ECG: EKG on 3/24/2022 shows sinus rhythm 58/min right bundle branch block. This patient was educated using the patient point room wall mount device. Absence from smokers and smoking and diet and exercising are important. Assessment:   Blood pressure etc. all okay. He does have a history of hypertension hyperlipidemia and what he calls hemoconcentration \"\" we will need to have a repeat on his labs. Plan:   CBC CMP lipid profile continue current medicines from our perspective return to see me in 1 year. Please call if we can assist further [phone]. Orlin Dubose.  Demian HAYDEN, [facility]      This note was likely completed using voice recognition technology and may contain unintended errors

## 2023-01-27 DIAGNOSIS — Z00.00 ROUTINE CHECK-UP: Primary | ICD-10-CM

## 2023-01-27 RX ORDER — HYDROCHLOROTHIAZIDE 25 MG/1
TABLET ORAL
Qty: 90 TABLET | Refills: 3 | Status: SHIPPED | OUTPATIENT
Start: 2023-01-27

## 2023-03-06 NOTE — TELEPHONE ENCOUNTER
LV 12/23/22 WITH TR  FOR BRONCHITIS NV NONE  PLEASE CALL PT TO SCHEDULE APPT FOR ROUTINE HTN ,AND OTHER CHRONIC ISSUES

## 2023-03-07 RX ORDER — ATORVASTATIN CALCIUM 40 MG/1
TABLET, FILM COATED ORAL
Qty: 90 TABLET | Refills: 0 | Status: SHIPPED | OUTPATIENT
Start: 2023-03-07

## 2023-03-09 ENCOUNTER — OFFICE VISIT (OUTPATIENT)
Dept: FAMILY MEDICINE CLINIC | Age: 68
End: 2023-03-09
Payer: COMMERCIAL

## 2023-03-09 VITALS
SYSTOLIC BLOOD PRESSURE: 138 MMHG | OXYGEN SATURATION: 97 % | BODY MASS INDEX: 29.66 KG/M2 | HEIGHT: 72 IN | HEART RATE: 80 BPM | DIASTOLIC BLOOD PRESSURE: 86 MMHG | WEIGHT: 219 LBS

## 2023-03-09 DIAGNOSIS — M33.90 DERMATOMYOSITIS (HCC): ICD-10-CM

## 2023-03-09 DIAGNOSIS — E78.2 MIXED HYPERLIPIDEMIA: Chronic | ICD-10-CM

## 2023-03-09 DIAGNOSIS — I25.10 CORONARY ARTERY DISEASE INVOLVING NATIVE CORONARY ARTERY OF NATIVE HEART WITHOUT ANGINA PECTORIS: Primary | Chronic | ICD-10-CM

## 2023-03-09 DIAGNOSIS — I10 ESSENTIAL HYPERTENSION: Chronic | ICD-10-CM

## 2023-03-09 PROCEDURE — 3079F DIAST BP 80-89 MM HG: CPT

## 2023-03-09 PROCEDURE — 99213 OFFICE O/P EST LOW 20 MIN: CPT

## 2023-03-09 PROCEDURE — 3075F SYST BP GE 130 - 139MM HG: CPT

## 2023-03-09 PROCEDURE — 1123F ACP DISCUSS/DSCN MKR DOCD: CPT

## 2023-03-09 RX ORDER — ATORVASTATIN CALCIUM 40 MG/1
40 TABLET, FILM COATED ORAL DAILY
Qty: 90 TABLET | Refills: 3 | Status: CANCELLED | OUTPATIENT
Start: 2023-03-09

## 2023-03-09 RX ORDER — METOPROLOL TARTRATE 50 MG/1
TABLET, FILM COATED ORAL
Qty: 270 TABLET | Refills: 3 | Status: CANCELLED | OUTPATIENT
Start: 2023-03-09

## 2023-03-09 SDOH — ECONOMIC STABILITY: HOUSING INSECURITY
IN THE LAST 12 MONTHS, WAS THERE A TIME WHEN YOU DID NOT HAVE A STEADY PLACE TO SLEEP OR SLEPT IN A SHELTER (INCLUDING NOW)?: NO

## 2023-03-09 SDOH — ECONOMIC STABILITY: INCOME INSECURITY: HOW HARD IS IT FOR YOU TO PAY FOR THE VERY BASICS LIKE FOOD, HOUSING, MEDICAL CARE, AND HEATING?: NOT HARD AT ALL

## 2023-03-09 SDOH — ECONOMIC STABILITY: FOOD INSECURITY: WITHIN THE PAST 12 MONTHS, YOU WORRIED THAT YOUR FOOD WOULD RUN OUT BEFORE YOU GOT MONEY TO BUY MORE.: NEVER TRUE

## 2023-03-09 SDOH — ECONOMIC STABILITY: FOOD INSECURITY: WITHIN THE PAST 12 MONTHS, THE FOOD YOU BOUGHT JUST DIDN'T LAST AND YOU DIDN'T HAVE MONEY TO GET MORE.: NEVER TRUE

## 2023-03-09 ASSESSMENT — PATIENT HEALTH QUESTIONNAIRE - PHQ9
SUM OF ALL RESPONSES TO PHQ QUESTIONS 1-9: 0
2. FEELING DOWN, DEPRESSED OR HOPELESS: 0
1. LITTLE INTEREST OR PLEASURE IN DOING THINGS: 0
SUM OF ALL RESPONSES TO PHQ9 QUESTIONS 1 & 2: 0
SUM OF ALL RESPONSES TO PHQ QUESTIONS 1-9: 0

## 2023-03-09 ASSESSMENT — ENCOUNTER SYMPTOMS
COLOR CHANGE: 0
WHEEZING: 0
SHORTNESS OF BREATH: 0
VOMITING: 0
CHEST TIGHTNESS: 0
NAUSEA: 0
ABDOMINAL PAIN: 0
COUGH: 0

## 2023-03-09 NOTE — PATIENT INSTRUCTIONS
You may receive a survey regarding the care you received during your visit. Your input is valuable to us. We encourage you to complete and return your survey. We hope you will choose us in the future for your healthcare needs. GENERAL OFFICE POLICIES      Telephone Calls: Messages will be answered within 1-2 business days, unless the provider is out of the office. If it is urgent a covering provider will answer. (this does not include Medication refills). MyChart: We recommend all patients sign up for [employer]. Through this portal you can see your lab results, request refills, schedule appointments, pay your bill and send messages to the office. [employer] messages will be answered within 1-2 business days unless the provider is out of the office. For urgent matters, please call the office. Appointments:  All appointments must be scheduled. We ask all patients to schedule their next follow up appointment before they leave the office to make sure you will be able to be seen before you run out of medications. 24 hours notice is required to cancel or reschedule an appointment to avoid being marked as a no show. You may be dismissed from the practice after 3 no shows. LATE for Appointment: If you are 15 or more minutes late for your appointment, you may be asked to reschedule. MA/LAB APPTS: Must be scheduled, cannot accept walk in lab visits. We only draw labs for patients established in our office. We only do injections for medications ordered by our office. Acute Sick Visits:  Nothing other than acute complaint will be addressed at this visit. TRADITIONAL MEDICARE  DOES NOT COVER PHYSICALS  MEDICARE WELLNESS VISITS: These are NOT physicals but the free annual visit offered by Medicare to discuss wellness issues. Medication refills, checkups, etc. will not be addressed during this visit.   Medication Refills: Refills are handled electronically so please contact your pharmacy for medication refills even if current refills have been exhausted. If you are on a controlled medication you will be referred to a specialist (pain specialist, psychiatry, etc). Forms: There is a $35 fee to fill out FMLA/Disability paperwork, payable at time of . Instead of the fee, you can choose to have the paperwork filled out during a separate office visit that is for filling out the paperwork only. Medication Samples: This office does not carry medication samples. If you need assistance in getting your medications, then please let the medical assistant know so they can help you sign up for a drug assistance program that can help get medications at a reduced cost or even free (if you qualify). Workman's Comp Claims: We do not handle workman's comp cases or claims. You will need to go to an urgent care to be seen or to whomever your employer uses.   General - Any abusive/rude behavior toward staff/providers may be cause for dismissal.

## 2023-03-09 NOTE — PROGRESS NOTES
Juhi Isaac (:  1955) is a 70 y.o. male,Established patient, here for evaluation of the following chief complaint(s):  Hypertension (FOLLOW UP ON HTN ) and Hyperlipidemia (FOLLOW UP ON CHOLESTEROL)         ASSESSMENT/PLAN:  1. Coronary artery disease involving native coronary artery of native heart without angina pectoris  -Doing well with last modification of medication. No changes to current plan. -Continue following with cardiology. Follow-up in 1 year. 2. Essential hypertension  -Doing well with last modification of medication. No changes to current plan. -Continue following with cardiology. Follow-up in 1 year. 3. Mixed hyperlipidemia  -Doing well with last modification of medication. No changes to current plan. -Continue following with cardiology. Follow-up in 1 year. 4. Dermatomyositis ([facility] [address]  -No skin problems. Continue monitoring. Return in about 1 year (around 3/9/2024) for Physical, Fasting Labs. Subjective   SUBJECTIVE/OBJECTIVE:  QUINN Isaac presents to follow-up on CAD, HTN, HLD. He reports he is doing well overall, no concerns. He saw cardiology recently and he had good results from his blood work. He was cut back on gentamicin which went very well. He did a lot of walking there. He tries eat healthy. Likes to walk around the house in the area of the hill by his place. He goes to the [employer] to work out. He will ride his next the bike when it is warmer. 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  He mostly does orthopedic surgeries. He has no concerns today. Review of Systems   Constitutional:  Negative for activity change, diaphoresis, fatigue and unexpected weight change. Respiratory:  Negative for cough, chest tightness, shortness of breath and wheezing. Cardiovascular:  Negative for chest pain, palpitations and leg swelling. Gastrointestinal:  Negative for abdominal pain, nausea and vomiting.    Genitourinary:  Negative for decreased urine volume. Musculoskeletal:  Negative for gait problem and neck pain. Skin:  Negative for color change, pallor and wound. Neurological:  Negative for dizziness, syncope, facial asymmetry, speech difficulty, weakness, light-headedness and headaches. Hematological:  Does not bruise/bleed easily. Psychiatric/Behavioral:  Negative for agitation, confusion, dysphoric mood and sleep disturbance. The patient is not nervous/anxious. Objective   Physical Exam  Vitals and nursing note reviewed. Constitutional:       General: He is not in acute distress. Appearance: Normal appearance. He is normal weight. He is not diaphoretic. HENT:      Head: Normocephalic and atraumatic. Mouth/Throat:      Mouth: Mucous membranes are moist.      Pharynx: Oropharynx is clear. Eyes:      General: No scleral icterus. Extraocular Movements: Extraocular movements intact. Pupils: Pupils are equal, round, and reactive to light. Neck:      Vascular: No carotid bruit. Cardiovascular:      Rate and Rhythm: Normal rate and regular rhythm. Pulses: Normal pulses. Heart sounds: Normal heart sounds. No murmur heard. No friction rub. No gallop. Pulmonary:      Effort: Pulmonary effort is normal. No respiratory distress. Breath sounds: Normal breath sounds. No stridor. No wheezing, rhonchi or rales. Chest:      Chest wall: No tenderness. Abdominal:      General: Bowel sounds are normal. There is no distension. Palpations: Abdomen is soft. Tenderness: There is no abdominal tenderness. There is no right CVA tenderness, left CVA tenderness or guarding. Musculoskeletal:         General: No swelling, tenderness or signs of injury. Normal range of motion. Cervical back: Normal range of motion and neck supple. No tenderness. Right lower leg: No edema. Left lower leg: No edema. Skin:     General: Skin is warm and dry.       Capillary Refill: Capillary refill takes less than 2 seconds. Coloration: Skin is not jaundiced or pale. Findings: No bruising or erythema. Neurological:      Mental Status: He is alert and oriented to person, place, and time. Motor: No weakness. Gait: Gait normal.   Psychiatric:         Mood and Affect: Mood normal.         Behavior: Behavior normal.         Thought Content: Thought content normal.         Judgment: Judgment normal.                An electronic signature was used to authenticate this note. --Radha Singh, APRN - CNP       Please note that this chart was generated using dragon dictation software. Although every effort was made to ensure the accuracy of this automated transcription, some errors in transcription may have occurred.

## 2023-04-20 DIAGNOSIS — I25.10 CORONARY ARTERY DISEASE INVOLVING NATIVE CORONARY ARTERY OF NATIVE HEART WITHOUT ANGINA PECTORIS: Chronic | ICD-10-CM

## 2023-04-20 DIAGNOSIS — I10 ESSENTIAL HYPERTENSION: Chronic | ICD-10-CM

## 2023-04-20 RX ORDER — METOPROLOL TARTRATE 50 MG/1
TABLET, FILM COATED ORAL
Qty: 270 TABLET | Refills: 0 | Status: SHIPPED | OUTPATIENT
Start: 2023-04-20

## 2023-04-27 ENCOUNTER — OFFICE VISIT (OUTPATIENT)
Dept: FAMILY MEDICINE CLINIC | Age: 68
End: 2023-04-27

## 2023-04-27 ENCOUNTER — TELEPHONE (OUTPATIENT)
Dept: FAMILY MEDICINE CLINIC | Age: 68
End: 2023-04-27

## 2023-04-27 VITALS
HEIGHT: 72 IN | BODY MASS INDEX: 30.34 KG/M2 | WEIGHT: 224 LBS | DIASTOLIC BLOOD PRESSURE: 108 MMHG | SYSTOLIC BLOOD PRESSURE: 150 MMHG | OXYGEN SATURATION: 98 % | HEART RATE: 80 BPM

## 2023-04-27 DIAGNOSIS — Z23 NEED FOR TDAP VACCINATION: ICD-10-CM

## 2023-04-27 DIAGNOSIS — L98.9 SKIN LESION: ICD-10-CM

## 2023-04-27 DIAGNOSIS — Z23 NEED FOR SHINGLES VACCINE: ICD-10-CM

## 2023-04-27 DIAGNOSIS — M33.90 DERMATOMYOSITIS (HCC): Primary | Chronic | ICD-10-CM

## 2023-04-27 DIAGNOSIS — L85.9 HYPERKERATOSIS OF SKIN: Primary | ICD-10-CM

## 2023-04-27 RX ORDER — TAMSULOSIN HYDROCHLORIDE 0.4 MG/1
CAPSULE ORAL
COMMUNITY
Start: 2023-03-21

## 2023-04-27 ASSESSMENT — ENCOUNTER SYMPTOMS: ROS SKIN COMMENTS: SKIN LESION

## 2023-04-27 NOTE — PATIENT INSTRUCTIONS
You may receive a survey regarding the care you received during your visit. Your input is valuable to us. We encourage you to complete and return your survey. We hope you will choose us in the future for your healthcare needs. GENERAL OFFICE POLICIES      Telephone Calls: Messages will be answered within 1-2 business days, unless the provider is out of the office. If it is urgent a covering provider will answer. (this does not include Medication refills). MyChart: We recommend all patients sign up for [employer]. Through this portal you can see your lab results, request refills, schedule appointments, pay your bill and send messages to the office. [employer] messages will be answered within 1-2 business days unless the provider is out of the office. For urgent matters, please call the office. Appointments:  All appointments must be scheduled. We ask all patients to schedule their next follow up appointment before they leave the office to make sure you will be able to be seen before you run out of medications. 24 hours notice is required to cancel or reschedule an appointment to avoid being marked as a no show. You may be dismissed from the practice after 3 no shows. LATE for Appointment: If you are 15 or more minutes late for your appointment, you may be asked to reschedule. MA/LAB APPTS: Must be scheduled, cannot accept walk in lab visits. We only draw labs for patients established in our office. We only do injections for medications ordered by our office. Acute Sick Visits:  Nothing other than acute complaint will be addressed at this visit. TRADITIONAL MEDICARE  DOES NOT COVER PHYSICALS  MEDICARE WELLNESS VISITS: These are NOT physicals but the free annual visit offered by Medicare to discuss wellness issues. Medication refills, checkups, etc. will not be addressed during this visit.   Medication Refills: Refills are handled electronically so please contact your pharmacy for medication refills

## 2023-04-27 NOTE — PROGRESS NOTES
Madison Person (:  1955) is a 70 y.o. male,Established patient, here for evaluation of the following chief complaint(s):    Lesion(s) ([place]; [place] IN COL[place];/Since yesterday )      SUBJECTIVE/OBJECTIVE:  HPI    Dez Khan states he noticed a gray skin lesion on right breast  it does not itch just there it is about the size of a jax-   He does take his b/p meds as prescribed   his b/p is usually good so  he does not check his b/p at home- he denies cp   Review of Systems   Skin:         SKIN LESION         Physical Exam  Vitals reviewed. Skin:     Comments: SKIN LESION NOTED AROUND RIGHT BREAST - TAN COLORED ROUGH THICKENED SKIN  NOTED AROUND THE NIPPLE   Neurological:      Mental Status: He is alert and oriented to person, place, and time. Psychiatric:         Attention and Perception: Attention and perception normal.         Mood and Affect: Mood and affect normal.         Speech: Speech normal.         Behavior: Behavior normal.         Thought Content: Thought content normal.         Cognition and Memory: Cognition and memory normal.         Judgment: Judgment normal.       Dez Khan was seen today for lesion(s). Diagnoses and all orders for this visit:    Hyperkeratosis of skin  -     Florence Ramsey MD, Dermatology, [place]  -     [id number] - DESTRUCTION BENIGN LESIONS 15 OR MORE    Need for Tdap vaccination  -     Tdap, BOOSTRIX, (age 8 yrs+), IM    Need for shingles vaccine  -     Zoster, SHINGRIX, (18 yrs +), IM         An electronic signature was used to authenticate this note.     --SAMPSON Caballero - CNP

## 2023-04-27 NOTE — PROGRESS NOTES
Immunizations Administered       Name Date Dose Route    TDaP, ADACEL (age 10y-63y), [address] (age 10y+), IM, 0.5mL 4/27/2023 0.5 mL Intramuscular    Site: Deltoid- Left    Lot: [id number]    NDC: 22685-986-55    Zoster Recombinant (Shingrix) 4/27/2023 0.5 mL Intramuscular    Site: Deltoid- Right    Lot: [id number]    ND: 60686-177-72

## 2023-05-01 NOTE — TELEPHONE ENCOUNTER
Spoke with patient. Explained it could take a long time to get in with the dermatologist we sent him to. We will try a new group    Dermatomyositis [facility])  -     Ambulatory referral to Dermatology  Dermatology [place] [facility] Eather Duane or any available provider at [facility] office.   Skin lesion  -     Ambulatory referral to Dermatology

## 2023-06-02 ENCOUNTER — HOSPITAL ENCOUNTER (OUTPATIENT)
Age: 68
Discharge: HOME | End: 2023-06-02
Payer: COMMERCIAL

## 2023-06-02 ENCOUNTER — HOSPITAL ENCOUNTER (OUTPATIENT)
Dept: GENERAL RADIOLOGY | Age: 68
Discharge: HOME | End: 2023-06-02
Attending: NEUROLOGICAL SURGERY
Payer: COMMERCIAL

## 2023-06-02 PROCEDURE — 72110 X-RAY EXAM L-2 SPINE 4/>VWS: CPT

## 2023-06-02 RX ORDER — DICLOFENAC SODIUM 75 MG/1
75 TABLET, DELAYED RELEASE ORAL 2 TIMES DAILY
Qty: 60 TABLET | Refills: 3 | Status: SHIPPED | OUTPATIENT
Start: 2023-06-02

## 2023-06-21 RX ORDER — CELECOXIB 200 MG/1
200 CAPSULE ORAL DAILY
Qty: 30 CAPSULE | Refills: 2 | Status: SHIPPED | OUTPATIENT
Start: 2023-06-21 | End: 2023-09-19

## 2023-06-30 ENCOUNTER — HOSPITAL ENCOUNTER (OUTPATIENT)
Dept: INTERVENTIONAL RADIOLOGY/VASCULAR | Age: 68
Discharge: HOME | End: 2023-06-30
Payer: COMMERCIAL

## 2023-06-30 DIAGNOSIS — M51.16 DISPLACEMENT OF LUMBAR DISC WITH RADICULOPATHY: ICD-10-CM

## 2023-06-30 PROCEDURE — 62323 NJX INTERLAMINAR LMBR/SAC: CPT

## 2023-06-30 PROCEDURE — 2709999900 HC NON-CHARGEABLE SUPPLY

## 2023-06-30 PROCEDURE — 2500000003 HC RX 250 WO HCPCS

## 2023-06-30 PROCEDURE — 6360000002 HC RX W HCPCS

## 2023-06-30 PROCEDURE — 6360000004 HC RX CONTRAST MEDICATION: Performed by: RADIOLOGY

## 2023-06-30 RX ADMIN — IOHEXOL 10 ML: 350 INJECTION, SOLUTION INTRAVENOUS at 09:57

## 2023-07-10 ENCOUNTER — NURSE ONLY (OUTPATIENT)
Dept: FAMILY MEDICINE CLINIC | Age: 68
End: 2023-07-10
Payer: COMMERCIAL

## 2023-07-10 DIAGNOSIS — Z23 NEED FOR SHINGLES VACCINE: Primary | ICD-10-CM

## 2023-07-10 PROCEDURE — 90750 HZV VACC RECOMBINANT IM: CPT | Performed by: NURSE PRACTITIONER

## 2023-07-10 PROCEDURE — 99999 PR OFFICE/OUTPT VISIT,PROCEDURE ONLY: CPT | Performed by: NURSE PRACTITIONER

## 2023-07-10 PROCEDURE — 90471 IMMUNIZATION ADMIN: CPT | Performed by: NURSE PRACTITIONER

## 2023-07-10 NOTE — PROGRESS NOTES
Immunizations Administered       Name Date Dose Route    Zoster Recombinant (Shingrix) 7/10/2023 0.5 mL Intramuscular    Site: Deltoid- Right    Lot: [id number]    [address]: 88438-119-65

## 2023-07-28 ENCOUNTER — TELEPHONE (OUTPATIENT)
Dept: FAMILY MEDICINE CLINIC | Age: 68
End: 2023-07-28

## 2023-07-28 DIAGNOSIS — I10 ESSENTIAL HYPERTENSION: Chronic | ICD-10-CM

## 2023-07-28 DIAGNOSIS — I25.10 CORONARY ARTERY DISEASE INVOLVING NATIVE CORONARY ARTERY OF NATIVE HEART WITHOUT ANGINA PECTORIS: Chronic | ICD-10-CM

## 2023-07-28 RX ORDER — METOPROLOL TARTRATE 50 MG/1
50 TABLET, FILM COATED ORAL 3 TIMES DAILY
Qty: 270 TABLET | Refills: 0 | Status: SHIPPED | OUTPATIENT
Start: 2023-07-28

## 2023-07-31 ENCOUNTER — PATIENT MESSAGE (OUTPATIENT)
Dept: FAMILY MEDICINE CLINIC | Age: 68
End: 2023-07-31

## 2023-08-01 NOTE — TELEPHONE ENCOUNTER
From: Hilton Bundy  To: Everett Morel  Sent: 7/31/2023 11:30 PM EDT  Subject: Zoran Almanzar    Was wondering if this script was sent in to [address] [place] [address] to.be filled, thanks

## 2023-08-28 NOTE — DISCHARGE INSTRUCTIONS
Lumbar Epidural Steroid Injection  Patient Discharge Instructions      When [address] [facility] should not drive the day of the procedure. You may experience leg weakness during the first 24 hours following the procedure. To prevent yourself from falling, it is important to have someone help you walk. However, you do not need to stay in bed when you get home. In fact, it is best to walk around if you feel up to it, but you will need assistance during the first 24 hours following the epidural steroid injection. Even if you feel better right away, avoid activities that may strain your back. Keep in mind that some patients may feel increased pain for the first 24 hours. You should start feeling some pain relief 2-3 days following the injection. This is because the steroid will start working within three days of the injection with maximal effect by one week. At that time, we will evaluate your pain level to determine the need for another steroid injection. Remove bandaid(s) within 24 hours. When to Call Your Doctor    Call right away if you notice any of the following symptoms:    Severe pain or headache;  Fever or chills; Redness or swelling around the injection site. Loss of bladder or bowel control. You may contact [address] [place] [address] for any questions or problems that may occur at [phone] during the hours of 9am-4pm Monday-Friday, or the hospital  after hours at [phone], to have the interventional radiologist on call paged. [facility]  Cardiovascular Special Procedures  General Discharge Instructions      ____ You may be drowsy or lightheaded after receiving sedation.  DO NOT operate a vehicle (automobile, bicycle, motorcycle, machinery, or power tools), make any important decisions or sign any important/legal documents, or drink alcoholic beverages for the next 24 hours  _x___ We strongly suggest that a responsible adult be with you for

## 2023-08-29 ENCOUNTER — HOSPITAL ENCOUNTER (OUTPATIENT)
Dept: INTERVENTIONAL RADIOLOGY/VASCULAR | Age: 68
Discharge: HOME | End: 2023-08-29
Payer: COMMERCIAL

## 2023-08-29 DIAGNOSIS — M51.16 DISPLACEMENT OF LUMBAR DISC WITH RADICULOPATHY: ICD-10-CM

## 2023-08-29 PROCEDURE — 2709999900 HC NON-CHARGEABLE SUPPLY

## 2023-08-29 PROCEDURE — 6360000004 HC RX CONTRAST MEDICATION: Performed by: RADIOLOGY

## 2023-08-29 PROCEDURE — 62323 NJX INTERLAMINAR LMBR/SAC: CPT

## 2023-08-29 RX ADMIN — IOHEXOL 2 ML: 180 INJECTION INTRAVENOUS at 10:53

## 2023-09-12 RX ORDER — ATORVASTATIN CALCIUM 40 MG/1
TABLET, FILM COATED ORAL
Qty: 90 TABLET | Refills: 0 | Status: SHIPPED | OUTPATIENT
Start: 2023-09-12

## 2023-10-03 DIAGNOSIS — F51.04 PSYCHOPHYSIOLOGICAL INSOMNIA: ICD-10-CM

## 2023-10-04 RX ORDER — CLOMIPRAMINE HYDROCHLORIDE 25 MG/1
CAPSULE ORAL
Qty: 270 CAPSULE | Refills: 0 | Status: SHIPPED | OUTPATIENT
Start: 2023-10-04

## 2023-10-04 RX ORDER — ZOLPIDEM TARTRATE 10 MG/1
TABLET ORAL
Qty: 30 TABLET | Refills: 3 | OUTPATIENT
Start: 2023-10-04 | End: 2024-03-01

## 2023-10-05 ENCOUNTER — E-VISIT (OUTPATIENT)
Dept: FAMILY MEDICINE CLINIC | Age: 68
End: 2023-10-05
Payer: MEDICARE

## 2023-10-05 DIAGNOSIS — F51.04 PSYCHOPHYSIOLOGICAL INSOMNIA: ICD-10-CM

## 2023-10-05 PROCEDURE — 99422 OL DIG E/M SVC 11-20 MIN: CPT | Performed by: NURSE PRACTITIONER

## 2023-10-05 RX ORDER — ZOLPIDEM TARTRATE 10 MG/1
TABLET ORAL
Qty: 30 TABLET | Refills: 5 | Status: SHIPPED | OUTPATIENT
Start: 2023-10-05 | End: 2024-03-03

## 2023-10-24 DIAGNOSIS — I10 ESSENTIAL HYPERTENSION: Chronic | ICD-10-CM

## 2023-10-24 DIAGNOSIS — I25.10 CORONARY ARTERY DISEASE INVOLVING NATIVE CORONARY ARTERY OF NATIVE HEART WITHOUT ANGINA PECTORIS: Chronic | ICD-10-CM

## 2023-10-24 RX ORDER — METOPROLOL TARTRATE 50 MG/1
50 TABLET, FILM COATED ORAL 3 TIMES DAILY
Qty: 270 TABLET | Refills: 0 | Status: SHIPPED | OUTPATIENT
Start: 2023-10-24

## 2023-10-24 NOTE — PROGRESS NOTES
244       2022    Perry Pulliam (:  1955) is a 78 y.o. male, here for a preventive medicine evaluation. Patient Active Problem List   Diagnosis    Hypertension    Hyperlipidemia    Dermatomyositis ([address]    Insomnia    Anxiety    Dysfunction of eustachian tube    History of lumbar laminectomy    Abnormal EKG    Coronary artery disease involving native coronary artery of native heart without angina pectoris    H/O angiography    Right bundle branch block left axis -bifascicular block    Homocysteinemia    Frequent PVCs     Has onychomycosis of feet and ?hands? Review of Systems    Prior to Visit Medications    Medication Sig Taking? Authorizing Provider   folic acid (FOLVITE) 1 MG tablet TAKE 1 TABLET BY MOUTH ONE TIME A DAY Yes Pedro Dias, DO   hydroCHLOROthiazide (HYDRODIURIL) 25 MG tablet TAKE 1 TABLET BY MOUTH ONE TIME A DAY Yes SAMPSON John - CNP   zolpidem (AMBIEN) 10 MG tablet TAKE ONE-HALF TO ONE TABLET BY MOUTH EVERY EVENING AS NEEDED FOR SLEEP Yes Rylee Dias, DO   atorvastatin (LIPITOR) 40 MG tablet TAKE 1 TABLET BY MOUTH ONE TIME A DAY Yes SAMPSON Valladares - CNP   clomiPRAMINE (ANAFRANIL) 25 MG capsule TAKE THREE CAPSULES BY MOUTH EVERY EVENING Yes Vicki Wilkins, DO   metoprolol tartrate (LOPRESSOR) 50 MG tablet TAKE 1 TABLET BY MOUTH 3 TIMES A DAY Yes Pedro Dias, DO   clobetasol (TEMOVATE) 0.05 % cream Apply topically 2 times daily Apply topically 2 times daily. Yes Vicki Wilknis, DO   triamcinolone (KENALOG) 0.1 % cream Apply topically 2 times daily Apply topically 2 times daily. Yes SAMPSON Villa - CNP   sildenafil (REVATIO) 20 MG tablet Take 1-5 tablets by mouth as needed Yes Historical Provider, MD   thyroid (ARMOUR) 130 MG tablet Take 130 mg by mouth daily Yes Historical Provider, MD   Testosterone 20 % CREA by Does not apply route. . Yes Historical Provider, MD   ibuprofen (ADVIL;MOTRIN) 200 MG tablet Take 200 mg by mouth 2 times daily Yes Historical Provider, MD   Cholecalciferol (VITAMIN D3) 5000 units TABS Take 1 tablet by mouth daily Yes Historical Provider, MD   Nutritional Supplements (DHEA PO) Take 35 mg by mouth daily Yes Historical Provider, MD        No Known Allergies    Past Medical History:   Diagnosis Date    Anxiety     BPH (benign prostatic hyperplasia) 2016    Chronic back pain     Colon polyps 2005    Coronary atherosclerosis 05/10/2019    cath, LAD had 20-25% narrowing but required no intervention other than maximizing medical tx.  Dermatomyositis ([address]     ED (erectile dysfunction)     History of skin cancer     Homocysteinemia 1/7/2021    Homocystine 20 on 1/7/21.  Hyperlipidemia     Hypertension 2008    Hypothyroidism     Insomnia     Osteoarthritis     Right bundle branch block left axis -bifascicular block 03/07/2019    -Right bundle branch block with .  Spinal cord tumor 2011    Testosterone deficiency        Past Surgical History:   Procedure Laterality Date    CARDIAC CATHETERIZATION  05/07/2019    COLONOSCOPY  09/13/2012    Dr. Irvin Harada COLONOSCOPY  04/16/2005    Dr. Zaida Heredia, adenomatous polyps    COLONOSCOPY  06/25/2009    Dr. Marbella Perry  12/30/2015    Dr. Zaida Heredia, recheck 2020    LUMBAR LAMINECTOMY  2008    L1-L2    NASAL SEPTUM SURGERY  1976    NERVE BIOPSY  2011    Spine:  myxopapillary ependymoma     PROSTATE SURGERY N/A 03/01/2021    UROLIFT at [facility] [place]. Dr Alexandra Mills.     SKIN CANCER EXCISION  12/08/2004    SCC chest wall    TYMPANOSTOMY TUBE PLACEMENT Bilateral 2017    UPPER GASTROINTESTINAL ENDOSCOPY  09/13/2012    Dr. Charmaine Orlando History     Socioeconomic History    Marital status:      Spouse name: Not on file    Number of children: 3    Years of education: Not on file    Highest education level: Not on file   Occupational History    Occupation: Surgical asst     Employer: [employer]     Comment: 24 hr//wk   Tobacco Use    Smoking status: Former Smoker     Packs/day: 1.00     Years: 22.00     Pack years: 22.00     Types: Cigarettes     Start date: 1972     Quit date:      Years since quittin.0    Smokeless tobacco: Never Used   Vaping Use    Vaping Use: Never used   Substance and Sexual Activity    Alcohol use: Yes     Alcohol/week: 3.0 standard drinks     Types: 3 Shots of liquor per week     Comment: OCCASIONAL.  Drug use: Not Currently     Comment: Tried MJ    Sexual activity: Yes     Partners: Female   Other Topics Concern    Not on file   Social History Narrative    Swim weekly 1 hr, bike 1/2 hr 2x/wk, weights 1x/wk x 45 min. Walking 3 mi 2x/wk. 12/3/20. Biking 2-3x/wk for 8 mi. [employer] weights weekly for 1 hr. Then walking when able. 20. Social Determinants of Health     Financial Resource Strain:     Difficulty of Paying Living Expenses: Not on file   Food Insecurity:     Worried About Running Out of Food in the Last Year: Not on file    Renetta of Food in the Last Year: Not on file   Transportation Needs:     Lack of Transportation (Medical): Not on file    Lack of Transportation (Non-Medical):  Not on file   Physical Activity:     Days of Exercise per Week: Not on file    Minutes of Exercise per Session: Not on file   Stress:     Feeling of Stress : Not on file   Social Connections:     Frequency of Communication with Friends and Family: Not on file    Frequency of Social Gatherings with Friends and Family: Not on file    Attends Anabaptism Services: Not on file    Active Member of Clubs or Organizations: Not on file    Attends Club or Organization Meetings: Not on file    Marital Status: Not on file   Intimate Partner Violence:     Fear of Current or Ex-Partner: Not on file    Emotionally Abused: Not on file    Physically Abused: Not on file    Sexually Abused: Not on file   Housing Stability:     Unable to Pay for Housing in the Last Year: Not on file    Number of [facility] in the Last Year: Not on file    Unstable Housing in the Last Year: Not on file        Family History   Problem Relation Age of Onset    Diabetes Mother     Breast Cancer Mother     Cancer Mother         bladder    Coronary Art Dis Father     Colon Cancer Father     Heart Surgery Brother 54        CABG    Coronary Art Dis Brother 54        Stent @70    High Cholesterol Brother     No Known Problems Brother     Depression Son     Anxiety Disorder Son     No Known Problems Son     Depression Daughter     Anxiety Disorder Daughter     Eczema Daughter        ADVANCE DIRECTIVE: N, <no information>    Vitals:    01/13/22 1420   BP: 122/74   Site: Right Upper Arm   Position: Sitting   Cuff Size: Medium Adult   Pulse: 66   Resp: 20   SpO2: 99%   Weight: 220 lb 3.2 oz (99.9 kg)   Height: 6' 1\" (1.854 m)     BP Readings from Last 3 Encounters:   01/13/22 122/74   07/16/21 (!) 157/85   06/14/21 128/84     Pulse Readings from Last 3 Encounters:   01/13/22 66   07/16/21 96   06/14/21 62     Wt Readings from Last 3 Encounters:   01/13/22 220 lb 3.2 oz (99.9 kg)   11/09/21 222 lb (100.7 kg)   06/14/21 222 lb (100.7 kg)     Body mass index is 29.05 kg/m². Physical Exam  Vitals and nursing note reviewed. Constitutional:       General: He is not in acute distress. Appearance: Normal appearance. He is well-developed. He is not diaphoretic. HENT:      Right Ear: Tympanic membrane, ear canal and external ear normal. No middle ear effusion. Tympanic membrane is not retracted or bulging. Left Ear: Tympanic membrane, ear canal and external ear normal.  No middle ear effusion. Tympanic membrane is not retracted or bulging. Nose: Nose normal. No mucosal edema or rhinorrhea. Mouth/Throat:      Mouth: Mucous membranes are not pale, not dry and not cyanotic. No oral lesions. Dentition: No dental caries. Pharynx: Uvula midline.  No oropharyngeal exudate, posterior oropharyngeal erythema or uvula swelling. Tonsils: No tonsillar abscesses. Eyes:      General: No scleral icterus. Right eye: No discharge. Left eye: No discharge. Conjunctiva/sclera: Conjunctivae normal.      Right eye: Right conjunctiva is not injected. No exudate. Left eye: Left conjunctiva is not injected. No exudate. Pupils: Pupils are equal, round, and reactive to light. Neck:      Thyroid: No thyroid mass or thyromegaly. Trachea: Trachea and phonation normal. No tracheal tenderness or tracheal deviation. Cardiovascular:      Rate and Rhythm: Normal rate and regular rhythm. No extrasystoles are present. Pulses:           Dorsalis pedis pulses are 2+ on the right side and 2+ on the left side. Posterior tibial pulses are 2+ on the right side and 2+ on the left side. Heart sounds: S1 normal. Heart sounds not distant. No murmur heard. No friction rub. No gallop. No S3 or S4 sounds. Pulmonary:      Effort: Pulmonary effort is normal. No respiratory distress. Breath sounds: Normal breath sounds. No stridor. No decreased breath sounds, wheezing, rhonchi or rales. Chest:   Breasts:      Right: No supraclavicular adenopathy. Left: No supraclavicular adenopathy. Abdominal:      General: Abdomen is protuberant. There is no distension. Palpations: Abdomen is not rigid. There is no hepatomegaly, splenomegaly, mass or pulsatile mass. Tenderness: There is no abdominal tenderness. There is no right CVA tenderness, left CVA tenderness, guarding or rebound. Negative signs include Greene's sign and McBurney's sign. Musculoskeletal:      Cervical back: Neck supple. Lymphadenopathy:      Head:      Right side of head: No submandibular or tonsillar adenopathy. Left side of head: No submandibular or tonsillar adenopathy. Cervical: No cervical adenopathy. Right cervical: No superficial, deep or posterior cervical adenopathy.      Left cervical: No superficial, deep or posterior cervical adenopathy. Upper Body:      Right upper body: No supraclavicular or pectoral adenopathy. Left upper body: No supraclavicular or pectoral adenopathy. Skin:     General: Skin is warm and dry. Coloration: Skin is not pale. Findings: No lesion. Nails: There is no clubbing. Neurological:      Mental Status: He is alert. Motor: No tremor or abnormal muscle tone. Coordination: Coordination normal.      Gait: Gait normal.      Deep Tendon Reflexes:      Reflex Scores:       Patellar reflexes are 2+ on the right side and 2+ on the left side. Psychiatric:         Behavior: Behavior normal.         Judgment: Judgment normal.     Media Information    Dermatology Image:  Dermatology Photo   Dermatomyositis with white nails right hand       Dermatology Image:  Dermatology Photo   Dermatomyositis left hand With white nails     No flowsheet data found.     Lab Results   Component Value Date    CHOL 119 01/25/2019    CHOL 230 05/08/2018    CHOLFAST 131 01/07/2021    TRIG 94 01/25/2019    TRIG 220 05/08/2018    TRIGLYCFAST 143 01/07/2021    HDL 22 01/07/2021    HDL 39 01/25/2019    HDL 31 05/08/2018    LDLCALC 80 01/07/2021    LDLCALC 61 01/25/2019    LDLCALC 155 05/08/2018    GLUCOSE 100 01/07/2021    LABA1C 5.1 01/25/2019       The 10-year ASCVD risk score (Soledad Hubbard, et al., 2013) is: 18.5%    Values used to calculate the score:      Age: 78 years      Sex: Male      Is Non- : No      Diabetic: No      Tobacco smoker: No      Systolic Blood Pressure: 158 mmHg      Is BP treated: Yes      HDL Cholesterol: 22 mg/dL      Total Cholesterol: 131 mg/dL    Immunization History   Administered Date(s) Administered    COVID-19, Pfizer, PF, 30mcg/0.3mL 03/23/2021, 04/27/2021, 12/23/2021    Influenza A ([id number]) Vaccine PF IM 11/05/2009    Influenza Vaccine, unspecified formulation 10/23/2017, 10/09/2018    Influenza Virus Vaccine 10/11/2019, 10/06/2020    Pneumococcal Conjugate 13-valent (Kelvin Parker) 03/07/2019       Health Maintenance   Topic Date Due    AAA screen  Never done    DTaP/Tdap/Td vaccine (1 - Tdap) Never done    Shingles Vaccine (1 of 2) Never done    Pneumococcal 65+ years Vaccine (2 of 2 - PPSV23) 03/07/2020    Flu vaccine (1) 09/01/2021    Potassium monitoring  01/07/2022    Creatinine monitoring  01/07/2022    Lipid screen  01/07/2022    Depression Screen  06/14/2022    Colon cancer screen colonoscopy  12/30/2025    COVID-19 Vaccine  Completed    Hepatitis C screen  Completed    Hepatitis A vaccine  Aged Out    Hepatitis B vaccine  Aged Out    Hib vaccine  Aged Out    Meningococcal (ACWY) vaccine  Aged Out        ASSESSMENT/PLAN:  351    Patient Instructions     GENERAL OFFICE POLICIES    Telephone Calls: Messages will be answered within 1-2 business days, unless the provider is out of the office. If it is urgent a covering provider will answer. (this does not include Medication refills). MyChart: We recommend all patients sign up for [employer]. Through this portal you can see your lab results, request refills, schedule appointments, pay your bill and send messages to the office. [employer] messages will be answered within 1-2 business days unless the provider is out of the office. For urgent matters, please call the office. Appointments:  All appointments must be scheduled. We ask all patients to schedule their next follow up appointment before they leave the office to make sure you will be able to be seen before you run out of medications. 24 hours notice is required to cancel or reschedule an appointment to avoid being marked as a no show. You may be dismissed from the practice after 3 no shows. LATE for Appointment: If you are 15 or more minutes late for your appointment, you may be asked to reschedule. MA/LAB APPTS: Must be scheduled, cannot accept walk in lab visits.   We only draw labs for patients established in our office. We only do injections for medications ordered by our office. Acute Sick Visits:  Nothing other than acute complaint will be addressed at this visit. TRADITIONAL MEDICARE  DOES NOT COVER PHYSICALS  MEDICARE WELLNESS VISITS: These are NOT physicals but the free annual visit offered by Medicare to discuss wellness issues. Medication refills, checkups, etc. will not be addressed during this visit. Medication Refills: Refills are handled electronically so please contact your pharmacy for medication refills even if current refills have been exhausted. If you are on a controlled medication you will be referred to a specialist (pain specialist, psychiatry, etc). Forms: There is a $35 fee to fill out FMLA/Disability paperwork, payable at time of . Instead of the fee, you can choose to have the paperwork filled out during a separate office visit that is for filling out the paperwork only. Medication Samples: This office does not carry medication samples. If you need assistance in getting your medications, then please let the medical assistant know so they can help you sign up for a drug assistance program that can help get medications at a reduced cost or even free (if you qualify). Workman's Comp Claims: We do not handle workman's comp cases or claims. You will need to go to an urgent care to be seen or to whomever your employer uses.   General  Any abusive/rude behavior toward staff/providers may be cause for dismissal.    Javid Be was seen today for annual exam.    Diagnoses and all orders for this visit:    Well adult exam  Health Maintenance Due   Topic Date Due    AAA screen  Never done    DTaP/Tdap/Td vaccine (1 - Tdap) Never done    Shingles Vaccine (1 of 2) Never done    Flu vaccine (1) 09/01/2021    Potassium monitoring  01/07/2022    Creatinine monitoring  01/07/2022    Lipid screen  01/07/2022     Mixed hyperlipidemia  -     Lipid Panel; Standing  - Comprehensive Metabolic Panel; Standing  The good HDL cholesterol is not on goal.  You can increase the HDL through exercise most effectively. The recommendation from the [facility] Service for exercise is 30 minutes brisk walking or the equivalent 5 times per week OR aerobic levels of exercise 25 minutes 3 times a week (breathing slightly hard and sweating at room temperature are indicators of aerobic exercise). OR walking 10,000 steps/day counted on a pedometer or cell phone or Fitbit type watch. However the newest recommendation encourages us to be active any way we can by making good choices such as taking the stairs instead of the elevator/escalator, parking at the end of the parking lots stores and walking the distance in and back out, looking for ways to be active with our families like going for a walk with our children or grandchildren, riding bikes with our family and friends, and instead of sitting on the couch and talking on the phone to friends or family drive to the mall and walk together while talking or talking together on the cell phone while we walk on treadmills or ride exercise bikes at home this can help encourage us to stick with a program to have accountability, or riding an exercise bike or walking on a treadmill or using an elliptical while watching our favorite television programs or movies for any period of time. For some people even 5 or 10 minutes 2 or 3 times a day would be extremely helpful. Omega 3 fatty acids such as are found in fish oil also raise the HDL. Each capsule of fish oil should have 800 mg or more of a combination of EPA & DHA - the active omega 3's- per capsule and you take between 1 caps per day until the next test. Omega 3's also lower triglycerides and help lubricate joints to lower arthritis pain.       Coronary artery disease involving native coronary artery of native heart without angina pectoris  Raise HDL    Essential hypertension  - Comprehensive Metabolic Panel; Standing  -     Good control.  -     Continue meds and lifestyle control. Dermatomyositis ([address]  -     Stable. -     Continue meds and lifestyle control. Anxiety  Anafranil 25 mg 3 capsules nightly. Vitamin D deficiency  -     Vitamin D 25 Hydroxy; Future  Recheck. Psychophysiological insomnia  -     Good control.  -     Continue meds and lifestyle control. Frequent PVCs  -     Comprehensive Metabolic Panel; Standing  Stable. Onychomycosis  Terbinafine 250 mg daily  Cut cholesterol med in 1/2 while on this med. Patient Education   Well Visit, Over 72: Care Instructions  Overview     Well visits can help you stay healthy. Your doctor has checked your overall health and may have suggested ways to take good care of yourself. Your doctor also may have recommended tests. At home, you can help prevent illness with healthy eating, regular exercise, and other steps. Follow-up care is a key part of your treatment and safety. Be sure to make and go to all appointments, and call your doctor if you are having problems. It's also a good idea to know your test results and keep a list of the medicines you take. How can you care for yourself at home? · Get screening tests that you and your doctor decide on. Screening helps find diseases before any symptoms appear. · Eat healthy foods. Choose fruits, vegetables, whole grains, protein, and low-fat dairy foods. Limit fat, especially saturated fat. Reduce salt in your diet. · Limit alcohol. If you are a man, have no more than 2 drinks a day or 14 drinks a week. If you are a woman, have no more than 1 drink a day or 7 drinks a week. Since alcohol affects older adults differently, you may want to limit alcohol even more. Or you may not want to drink at all. · Get at least 30 minutes of exercise on most days of the week. Walking is a good choice.  You also may want to do other activities, such as running, swimming, cycling, Bactrim Counseling:  I discussed with the patient the risks of sulfa antibiotics including but not limited to GI upset, allergic reaction, drug rash, diarrhea, dizziness, photosensitivity, and yeast infections.  Rarely, more serious reactions can occur including but not limited to aplastic anemia, agranulocytosis, methemoglobinemia, blood dyscrasias, liver or kidney failure, lung infiltrates or desquamative/blistering drug rashes.

## 2024-01-17 ENCOUNTER — OFFICE VISIT (OUTPATIENT)
Dept: FAMILY MEDICINE CLINIC | Age: 69
End: 2024-01-17
Payer: COMMERCIAL

## 2024-01-17 VITALS
SYSTOLIC BLOOD PRESSURE: 136 MMHG | BODY MASS INDEX: 29.66 KG/M2 | HEIGHT: 72 IN | WEIGHT: 219 LBS | DIASTOLIC BLOOD PRESSURE: 86 MMHG | HEART RATE: 80 BPM

## 2024-01-17 DIAGNOSIS — J45.909 ASTHMATIC BRONCHITIS WITHOUT COMPLICATION, UNSPECIFIED ASTHMA SEVERITY, UNSPECIFIED WHETHER PERSISTENT: Primary | ICD-10-CM

## 2024-01-17 DIAGNOSIS — M33.90 DERMATOMYOSITIS (HCC): ICD-10-CM

## 2024-01-17 PROCEDURE — G8484 FLU IMMUNIZE NO ADMIN: HCPCS | Performed by: NURSE PRACTITIONER

## 2024-01-17 PROCEDURE — 99213 OFFICE O/P EST LOW 20 MIN: CPT | Performed by: NURSE PRACTITIONER

## 2024-01-17 PROCEDURE — 1036F TOBACCO NON-USER: CPT | Performed by: NURSE PRACTITIONER

## 2024-01-17 PROCEDURE — 1123F ACP DISCUSS/DSCN MKR DOCD: CPT | Performed by: NURSE PRACTITIONER

## 2024-01-17 PROCEDURE — G8417 CALC BMI ABV UP PARAM F/U: HCPCS | Performed by: NURSE PRACTITIONER

## 2024-01-17 PROCEDURE — 3017F COLORECTAL CA SCREEN DOC REV: CPT | Performed by: NURSE PRACTITIONER

## 2024-01-17 PROCEDURE — G8427 DOCREV CUR MEDS BY ELIG CLIN: HCPCS | Performed by: NURSE PRACTITIONER

## 2024-01-17 PROCEDURE — 3079F DIAST BP 80-89 MM HG: CPT | Performed by: NURSE PRACTITIONER

## 2024-01-17 PROCEDURE — 3075F SYST BP GE 130 - 139MM HG: CPT | Performed by: NURSE PRACTITIONER

## 2024-01-17 RX ORDER — AMOXICILLIN AND CLAVULANATE POTASSIUM 875; 125 MG/1; MG/1
1 TABLET, FILM COATED ORAL 2 TIMES DAILY
Qty: 14 TABLET | Refills: 0 | Status: SHIPPED | OUTPATIENT
Start: 2024-01-17 | End: 2024-01-24

## 2024-01-17 RX ORDER — PREDNISONE 10 MG/1
TABLET ORAL
Qty: 20 TABLET | Refills: 0 | Status: SHIPPED | OUTPATIENT
Start: 2024-01-17

## 2024-01-17 RX ORDER — HYDROCODONE BITARTRATE AND HOMATROPINE METHYLBROMIDE ORAL SOLUTION 5; 1.5 MG/5ML; MG/5ML
5 LIQUID ORAL 3 TIMES DAILY PRN
Qty: 75 ML | Refills: 0 | Status: SHIPPED | OUTPATIENT
Start: 2024-01-17 | End: 2024-01-22

## 2024-01-17 ASSESSMENT — PATIENT HEALTH QUESTIONNAIRE - PHQ9
SUM OF ALL RESPONSES TO PHQ QUESTIONS 1-9: 0
SUM OF ALL RESPONSES TO PHQ9 QUESTIONS 1 & 2: 0
1. LITTLE INTEREST OR PLEASURE IN DOING THINGS: 0
SUM OF ALL RESPONSES TO PHQ QUESTIONS 1-9: 0
SUM OF ALL RESPONSES TO PHQ QUESTIONS 1-9: 0
2. FEELING DOWN, DEPRESSED OR HOPELESS: 0
SUM OF ALL RESPONSES TO PHQ QUESTIONS 1-9: 0

## 2024-01-17 ASSESSMENT — ENCOUNTER SYMPTOMS
COUGH: 1
WHEEZING: 0
CHOKING: 0
SHORTNESS OF BREATH: 0
STRIDOR: 0

## 2024-01-17 NOTE — PROGRESS NOTES
Manny Mccabe (:  1955) is a 68 y.o. male,Established patient, here for evaluation of the following chief complaint(s):    Cough (Cough for week, cold for two weeks )      SUBJECTIVE/OBJECTIVE:  HPI  Manny states he has had a cough for the last few weeks after he had a cold - stuffy nose then the cough started - comes is spasms - the cough sounds wet  gray green sputum in the am denies sob- chest tightness or wheezing  The cough is keeping his wife awake because it is worse at night he has tried Mucinex did not help with the cough - tussin  helped a bit  Review of Systems   Respiratory:  Positive for cough. Negative for choking, shortness of breath, wheezing and stridor.        Physical Exam  Vitals reviewed.   Constitutional:       General: He is awake. He is not in acute distress.     Appearance: Normal appearance. He is well-developed and well-groomed. He is not ill-appearing, toxic-appearing or diaphoretic.   Cardiovascular:      Rate and Rhythm: Normal rate and regular rhythm.      Heart sounds: Normal heart sounds, S1 normal and S2 normal. Heart sounds not distant. No murmur heard.     No systolic murmur is present.      No diastolic murmur is present.   Pulmonary:      Effort: Pulmonary effort is normal.      Breath sounds: Normal breath sounds.   Neurological:      Mental Status: He is alert and oriented to person, place, and time.   Psychiatric:         Attention and Perception: Attention and perception normal.         Mood and Affect: Mood and affect normal.         Speech: Speech normal.         Behavior: Behavior normal. Behavior is cooperative.         Thought Content: Thought content normal.         Cognition and Memory: Cognition and memory normal.         Judgment: Judgment normal.         Manny was seen today for cough.    Diagnoses and all orders for this visit:    Asthmatic bronchitis without complication, unspecified asthma severity, unspecified whether persistent  -     HYDROcodone

## 2024-01-22 DIAGNOSIS — I10 ESSENTIAL HYPERTENSION: Chronic | ICD-10-CM

## 2024-01-22 DIAGNOSIS — I25.10 CORONARY ARTERY DISEASE INVOLVING NATIVE CORONARY ARTERY OF NATIVE HEART WITHOUT ANGINA PECTORIS: Chronic | ICD-10-CM

## 2024-01-23 RX ORDER — METOPROLOL TARTRATE 50 MG/1
50 TABLET, FILM COATED ORAL 3 TIMES DAILY
Qty: 270 TABLET | Refills: 0 | Status: SHIPPED | OUTPATIENT
Start: 2024-01-23

## 2024-02-20 ENCOUNTER — OFFICE VISIT (OUTPATIENT)
Age: 69
End: 2024-02-20
Payer: COMMERCIAL

## 2024-02-20 VITALS
HEIGHT: 72 IN | SYSTOLIC BLOOD PRESSURE: 193 MMHG | WEIGHT: 220 LBS | BODY MASS INDEX: 29.8 KG/M2 | HEART RATE: 72 BPM | DIASTOLIC BLOOD PRESSURE: 94 MMHG | OXYGEN SATURATION: 99 %

## 2024-02-20 DIAGNOSIS — L98.9 ARM SKIN LESION, RIGHT: Primary | ICD-10-CM

## 2024-02-20 DIAGNOSIS — D49.2 NEOPLASM OF UNSPECIFIED BEHAVIOR OF BONE, SOFT TISSUE, AND SKIN: ICD-10-CM

## 2024-02-20 PROCEDURE — 99024 POSTOP FOLLOW-UP VISIT: CPT | Performed by: SURGERY

## 2024-02-20 PROCEDURE — 11601 EXC TR-EXT MAL+MARG 0.6-1 CM: CPT | Performed by: SURGERY

## 2024-02-20 RX ORDER — LIDOCAINE HYDROCHLORIDE 10 MG/ML
3 INJECTION, SOLUTION INFILTRATION; PERINEURAL ONCE
Status: COMPLETED | OUTPATIENT
Start: 2024-02-20 | End: 2024-02-20

## 2024-02-20 RX ADMIN — LIDOCAINE HYDROCHLORIDE 3 ML: 10 INJECTION, SOLUTION INFILTRATION; PERINEURAL at 10:36

## 2024-02-20 NOTE — PROGRESS NOTES
Yes Provider, MD Nanci   celecoxib (CELEBREX) 200 MG capsule Take 1 capsule by mouth daily 23  Agapito Tran MD         No Known Allergies    Social History     Socioeconomic History    Marital status:      Spouse name: Not on file    Number of children: 4    Years of education: Not on file    Highest education level: Not on file   Occupational History    Occupation: Surgical asst     Employer: [employer]     Comment: 24 hr//wk   Tobacco Use    Smoking status: Former     Current packs/day: 0.00     Average packs/day: 1 pack/day for 33.0 years (33.0 ttl pk-yrs)     Types: Cigarettes     Start date: 1972     Quit date:      Years since quittin.1    Smokeless tobacco: Never   Vaping Use    Vaping Use: Never used   Substance and Sexual Activity    Alcohol use: Yes     Alcohol/week: 5.0 standard drinks of alcohol     Types: 5 Shots of liquor per week     Comment: OCCASIONAL.     Drug use: Never    Sexual activity: Yes     Partners: Female   Other Topics Concern    Not on file   Social History Narrative    Swim weekly 1 hr, bike 1/2 hr 2x/wk, weights 1x/wk x 45 min. Walking 3 mi 2x/wk. 12/3/20. Biking 2-3x/wk for 8 mi. [employer] weights weekly for 1 hr. Then walking when able. 21. Stationary bike 30 2x/wk. Walking 1 mi 3x/wk if not too cold. Weights 2x/wk 30 min.22.3/24/22. Less exercise with sciatica.     Social Determinants of Health     Financial Resource Strain: Low Risk  (3/9/2023)    Overall Financial Resource Strain (CARDIA)     Difficulty of Paying Living Expenses: Not hard at all   Food Insecurity: Not on file (3/9/2023)   Transportation Needs: Unknown (3/9/2023)    PRAPARE - Transportation     Lack of Transportation (Medical): Not on file     Lack of Transportation (Non-Medical): No   Physical Activity: Not on file   Stress: Not on file   Social Connections: Not on file   Intimate Partner Violence: Not on file   Housing Stability: Unknown (3/9/2023)    Housing

## 2024-02-22 ENCOUNTER — PREP FOR PROCEDURE (OUTPATIENT)
Age: 69
End: 2024-02-22

## 2024-02-22 ENCOUNTER — TELEPHONE (OUTPATIENT)
Age: 69
End: 2024-02-22

## 2024-02-22 DIAGNOSIS — C43.61 MELANOMA OF RIGHT UPPER ARM (HCC): Primary | ICD-10-CM

## 2024-02-22 NOTE — TELEPHONE ENCOUNTER
Called to leave [facility] regarding a question in [employer] (number of employees)

## 2024-02-22 NOTE — PROGRESS NOTES
LOCAL    [facility] PRE-OPERATIVE INSTRUCTIONS    Day of Procedure:    3/1/24            Arrival time:      7:40          Surgery time:8:40    Take the following medications with a sip of water:  Follow your MD/Surgeons pre-procedure instructions regarding your medications     Do not eat or drink anything after 12:00 midnight prior to your surgery.  This includes water chewing gum, mints and ice chips.   You may brush your teeth and gargle the morning of your surgery, but do not swallow the water     Please see your family doctor/pediatrician for a history and physical and/or concerning medications.   Bring any test results/reports from your physicians office.   If you are under the care of a heart doctor or specialist doctor, please be aware that you may be asked to them for clearance    You may be asked to stop blood thinners such as Coumadin, Plavix, Fragmin, Lovenox, etc., or any anti-inflammatories such as:  Aspirin, Ibuprofen, Advil, Naproxen prior to your surgery.    We also ask that you stop any OTC medications such as fish oil, vitamin E, glucosamine, garlic, Multivitamins, COQ 10, etc. MAY TAKE TYLENOL    We ask that you do not smoke 24 hours prior to surgery  We ask that you do not  drink any alcoholic beverages 24 hours prior to surgery     You must make arrangements for a responsible adult to take you home after your surgery.    For your safety you will not be allowed to leave alone or drive yourself home.  Your surgery will be cancelled if you do not have a ride home.     Also for your safety, it is strongly suggested that someone stay with you the first 24 hours after your surgery.     A parent or legal guardian must accompany a child scheduled for surgery and plan to stay at the hospital until the child is discharged.    Please do not bring other children with you.    For your comfort, please wear simple loose fitting clothing to the hospital.  Please do not bring valuables.    Do not wear  according to your surgery.    C-Difficile admission screening and protocol:       * Admitted with diarrhea?                         [] YES    [x]  NO     *Prior history of C-Diff. In last 3 months? [] YES    [x]  NO     *Antibiotic use in the past 6-8 weeks?      [x]  NO    []  YES                 If yes, which ANTIBIOTIC AND REASON______     *Prior hospitalization or nursing home in the last month? []  YES    [x]  NO        SAFETY FIRST..call before you fall

## 2024-03-01 ENCOUNTER — HOSPITAL ENCOUNTER (OUTPATIENT)
Age: 69
Setting detail: OUTPATIENT SURGERY
Discharge: HOME | End: 2024-03-01
Attending: SURGERY | Admitting: SURGERY
Payer: COMMERCIAL

## 2024-03-01 VITALS
RESPIRATION RATE: 15 BRPM | TEMPERATURE: 97.2 F | OXYGEN SATURATION: 98 % | SYSTOLIC BLOOD PRESSURE: 136 MMHG | DIASTOLIC BLOOD PRESSURE: 97 MMHG | HEIGHT: 72 IN | BODY MASS INDEX: 28.44 KG/M2 | HEART RATE: 62 BPM | WEIGHT: 210 LBS

## 2024-03-01 DIAGNOSIS — C43.61 MELANOMA OF RIGHT UPPER ARM (HCC): ICD-10-CM

## 2024-03-01 PROCEDURE — 2500000003 HC RX 250 WO HCPCS: Performed by: SURGERY

## 2024-03-01 PROCEDURE — 7100000011 HC PHASE II RECOVERY - ADDTL 15 MIN: Performed by: SURGERY

## 2024-03-01 PROCEDURE — 2709999900 HC NON-CHARGEABLE SUPPLY: Performed by: SURGERY

## 2024-03-01 PROCEDURE — 11603 EXC TR-EXT MAL+MARG 2.1-3 CM: CPT | Performed by: SURGERY

## 2024-03-01 PROCEDURE — 2580000003 HC RX 258: Performed by: SURGERY

## 2024-03-01 PROCEDURE — 7100000010 HC PHASE II RECOVERY - FIRST 15 MIN: Performed by: SURGERY

## 2024-03-01 PROCEDURE — 3600000002 HC SURGERY LEVEL 2 BASE: Performed by: SURGERY

## 2024-03-01 PROCEDURE — 88305 TISSUE EXAM BY PATHOLOGIST: CPT

## 2024-03-01 PROCEDURE — A4217 STERILE WATER/SALINE, 500 ML: HCPCS | Performed by: SURGERY

## 2024-03-01 PROCEDURE — 3600000012 HC SURGERY LEVEL 2 ADDTL 15MIN: Performed by: SURGERY

## 2024-03-01 RX ORDER — MAGNESIUM HYDROXIDE 1200 MG/15ML
LIQUID ORAL CONTINUOUS PRN
Status: COMPLETED | OUTPATIENT
Start: 2024-03-01 | End: 2024-03-01

## 2024-03-01 RX ORDER — LIDOCAINE HYDROCHLORIDE AND EPINEPHRINE 10; 10 MG/ML; UG/ML
INJECTION, SOLUTION INFILTRATION; PERINEURAL
Status: COMPLETED | OUTPATIENT
Start: 2024-03-01 | End: 2024-03-01

## 2024-03-01 ASSESSMENT — PAIN SCALES - GENERAL
PAINLEVEL_OUTOF10: 0
PAINLEVEL_OUTOF10: 0

## 2024-03-01 ASSESSMENT — PAIN - FUNCTIONAL ASSESSMENT: PAIN_FUNCTIONAL_ASSESSMENT: 0-10

## 2024-03-01 NOTE — OP NOTE
Operative Report      Patient: Manny Mccabe MRN: [id number]     YOB: 1955  Age: 69 y.o.  Sex: male        Primary Care Physician: Amparo Mendez APRN - CNP         DATE OF OPERATION: 3/1/2024     PREOPERATIVE DIAGNOSIS: right upper arm melanoma    POSTOPERATIVE DIAGNOSIS: right upper arm melanoma    PROCEDURE PERFORMED: wide excision right upper arm melanoma    SURGEON: Agapito Hopkins MD, MD    ANESTHESIA: Local    ASA CLASS: 3    DVT PROPHYLAXIS: not indicated    ANTIBIOTICS: not indicated    INDICATIONS: Manny Mccabe presented with thin melanoma right upper arm.  As a result, I recommended wide local excision.  The risks, benefits, and alternatives were explained to the family and they are willing to consent for the procedure.    Procedure Details:  After informed consent was obtained, the patient was brought to the operating room and placed in the supine position.  The patient was hooked up to continuous monitoring and remained stable throughout the case.  The area was prepped and draped sterilely and a time out performed. I began by measuring 1 cm margins around previous punch biopsy site right upper arm.  After injecting local anesthetic a 6 cm x 2.2 cm elliptical incision was made around our lesion.  It was dissected down to fascia and excised completely.  Maximal diameter of excision was 2.2 cm.  The specimen was marked with a short stitch superiorly and long stitch laterally.  Subcutaneous flaps were raised with electrocautery and the wound closed with dermal 3-0 vicryls.  Skin was reapproximated with 4-0 vicryl in a subcuticular fashion.  Final wound measured 7 cm.  Dermabond was applied.  The patient tolerated the procedure well and was taken to recovery in stable condition.     Findings/specimen: right upper arm melanoma    Estimated Blood Loss: less than 50 ml    Complications: none                  Electronically signed by Agapito Hopkins MD on 3/1/2024 at 8:57

## 2024-03-01 NOTE — H&P
Update History & Physical    The patient's History and Physical of February 20, 2024 was reviewed with the patient and I examined the patient. There was interval biopsy with thin melanoma.  Plan wide excision right arm melanoma.  Right arm marked. The surgical site was confirmed by the patient and me.       Plan: The risks, benefits, expected outcome, and alternative to the recommended procedure have been discussed with the patient. Patient understands and wants to proceed with the procedure.     Electronically signed by Agapito Hopkins MD on 3/1/2024 at 8:18 AM

## 2024-03-01 NOTE — DISCHARGE INSTRUCTIONS
Agapito Hopkins MD         [facility]  [address] , [address]  [place] [address]  O: [phone]  F: [phone]                                                         POST-OPERATIVE INSTRUCTIONS FOR ARM EXCISION          Call the office to schedule your post-operative appointment with your surgeon for two (2) weeks.     You will have a water occlusive glue closing your incisions.    You may shower.  Wash incisions gently, and pat them dry. Do not rub your incisions.  Do not soak incisions in tub baths, hot tubs or pools    General guidelines for activity:  Avoid strenuous activity or lifting anything heavier than 15 pounds for one week.   It is OK to be up walking around; walking up and down stairs is also OK.   Do what is comfortable: stop and rest when you feel tired.     Drink plenty of fluids and stay on a bland diet for 2-3 days after surgery.     Do NOT drive for one week and while taking your narcotic pain medicine.    Watch for signs of infection:   Fever over 100.5°   Excessive warmth or bright redness around your incisions  Leakage of bloody or cloudy fluid from you incisions    You may take ibuprofen or tylenol as needed for pain    If you experience constipation  Increase your water intake, and activity; walking is best.  A stool softener or mild laxative may be necessary if you still have not had a bowel  movement ; call the office for further instructions.

## 2024-03-01 NOTE — PROGRESS NOTES
Pt to Phase 2 from OR, Pt is alert and oriented and denies pain at this time, vital signs stable on room air. Pt tolerating a drink and a snack.

## 2024-03-11 ENCOUNTER — OFFICE VISIT (OUTPATIENT)
Dept: CARDIOLOGY CLINIC | Age: 69
End: 2024-03-11
Payer: MEDICARE

## 2024-03-11 VITALS
DIASTOLIC BLOOD PRESSURE: 120 MMHG | WEIGHT: 226 LBS | SYSTOLIC BLOOD PRESSURE: 210 MMHG | HEART RATE: 69 BPM | BODY MASS INDEX: 30.65 KG/M2

## 2024-03-11 DIAGNOSIS — R94.31 ABNORMAL EKG: Primary | ICD-10-CM

## 2024-03-11 DIAGNOSIS — I25.10 CORONARY ARTERY DISEASE INVOLVING NATIVE CORONARY ARTERY OF NATIVE HEART WITHOUT ANGINA PECTORIS: Chronic | ICD-10-CM

## 2024-03-11 DIAGNOSIS — I45.10 RIGHT BUNDLE BRANCH BLOCK: Chronic | ICD-10-CM

## 2024-03-11 DIAGNOSIS — Z92.89 H/O ANGIOGRAPHY: ICD-10-CM

## 2024-03-11 PROCEDURE — G8427 DOCREV CUR MEDS BY ELIG CLIN: HCPCS | Performed by: NURSE PRACTITIONER

## 2024-03-11 PROCEDURE — G8484 FLU IMMUNIZE NO ADMIN: HCPCS | Performed by: NURSE PRACTITIONER

## 2024-03-11 PROCEDURE — 3080F DIAST BP >= 90 MM HG: CPT | Performed by: NURSE PRACTITIONER

## 2024-03-11 PROCEDURE — 3017F COLORECTAL CA SCREEN DOC REV: CPT | Performed by: NURSE PRACTITIONER

## 2024-03-11 PROCEDURE — 99215 OFFICE O/P EST HI 40 MIN: CPT | Performed by: NURSE PRACTITIONER

## 2024-03-11 PROCEDURE — 1123F ACP DISCUSS/DSCN MKR DOCD: CPT | Performed by: NURSE PRACTITIONER

## 2024-03-11 PROCEDURE — 3077F SYST BP >= 140 MM HG: CPT | Performed by: NURSE PRACTITIONER

## 2024-03-11 PROCEDURE — 1036F TOBACCO NON-USER: CPT | Performed by: NURSE PRACTITIONER

## 2024-03-11 PROCEDURE — 93000 ELECTROCARDIOGRAM COMPLETE: CPT | Performed by: NURSE PRACTITIONER

## 2024-03-11 PROCEDURE — G8417 CALC BMI ABV UP PARAM F/U: HCPCS | Performed by: NURSE PRACTITIONER

## 2024-03-11 RX ORDER — ATORVASTATIN CALCIUM 40 MG/1
TABLET, FILM COATED ORAL
Qty: 90 TABLET | Refills: 3 | Status: SHIPPED | OUTPATIENT
Start: 2024-03-11

## 2024-03-11 RX ORDER — CARVEDILOL 25 MG/1
25 TABLET ORAL 2 TIMES DAILY
Qty: 180 TABLET | Refills: 3 | Status: SHIPPED | OUTPATIENT
Start: 2024-03-11

## 2024-03-11 RX ORDER — HYDROCHLOROTHIAZIDE 25 MG/1
TABLET ORAL
Qty: 90 TABLET | Refills: 3 | Status: SHIPPED | OUTPATIENT
Start: 2024-03-11

## 2024-03-11 NOTE — PROGRESS NOTES
[facility]     Outpatient Follow Up Note  Dr Winston Arciniega MD,  FACC   Afsaneh Lepe RN, APRN,CNP CVNP      CHIEF COMPLAINT / HPI:  Manny Mccabe is 69 y.o. male who presents today with   CAD cath  with 20 to 30% mid LAD  HTN / HLD / hypothyroidism     Today: b/p elevated / up 16# he is oui of HCTZ / <1+ edema / no c/o cp SOB no c/o PND denies RODRI   Recommend no NSAIDS  he is taking Advil recommend tylenol prn / has back problems herniated discs and stenosis   Reorder HCTZ  Change metoprolol 150 mg TID / change to coreg 25mg BID   Fu at PCP in 1-2 weeks per pt request   To ED if b/p  remains elevated up    Discussed meds diet fluids    Fu in 6 months with blood work PTV      I spent a total of 60  minutes and greater than 50% of the time was spent counseling with patient  coordinating care regarding her diagnosis, reviewing labs, cardiac work up, treatments and plan of care.    Past Medical History:   Diagnosis Date    Anxiety     BPH (benign prostatic hyperplasia)     Chronic back pain     Colon polyps 2005    Coronary atherosclerosis 05/10/2019    cath, LAD had 20-25% narrowing but required no intervention other than maximizing medical tx.     COVID-19 virus infection     Dermatomyositis (HCC)     ED (erectile dysfunction)     History of skin cancer     Homocysteinemia 2021    Homocystine 20 on 21.    Hyperlipidemia     Hypertension     Hypothyroidism     Insomnia     Osteoarthritis     Right bundle branch block left axis -bifascicular block 2019    -Right bundle branch block with .    Spinal cord tumor     Testosterone deficiency     Wears glasses      Social History:    Social History     Tobacco Use   Smoking Status Former    Current packs/day: 0.00    Average packs/day: 1 pack/day for 33.0 years (33.0 ttl pk-yrs)    Types: Cigarettes    Start date: 1972    Quit date:     Years since quittin.2   Smokeless Tobacco Never     Current

## 2024-03-19 ENCOUNTER — OFFICE VISIT (OUTPATIENT)
Age: 69
End: 2024-03-19

## 2024-03-19 VITALS
HEART RATE: 87 BPM | WEIGHT: 220 LBS | SYSTOLIC BLOOD PRESSURE: 218 MMHG | BODY MASS INDEX: 29.8 KG/M2 | OXYGEN SATURATION: 97 % | HEIGHT: 72 IN | DIASTOLIC BLOOD PRESSURE: 115 MMHG

## 2024-03-19 DIAGNOSIS — C43.61 MELANOMA OF RIGHT UPPER ARM (HCC): Primary | ICD-10-CM

## 2024-03-19 PROCEDURE — 99024 POSTOP FOLLOW-UP VISIT: CPT | Performed by: SURGERY

## 2024-03-19 NOTE — PROGRESS NOTES
Subjective:      Patient ID: Manny Mccabe is a 69 y.o. male.    HPI  Status post wide excision right upper arm malignant melanoma 2.5 weeks ago.  Doing well.  Denies pain, other complaints.  Saw cardiology last week and carvedilol added    Path  Skin of right arm, excision:      - Negative for residuum of previously diagnosed malignant melanoma.      - Reactive changes consistent with prior operative site.      - Intradermal and compound melanocytic nevi, excised.   Review of Systems    Objective:   Physical Exam  Wound healed  Vitals:    03/19/24 1130   BP: (!) 218/115   Pulse:    SpO2:      Recheck 161/100  Assessment:       Diagnosis Orders   1. Melanoma of right upper arm (HCC)               Plan:      Recovering well  Final path discussed  Recommend annual derm visit for skin checks  F/U cardiology/PCP for ongoing HTN        Agapito Hopkins MD

## 2024-03-26 RX ORDER — DOXYCYCLINE HYCLATE 100 MG
100 TABLET ORAL 2 TIMES DAILY
Qty: 10 TABLET | Refills: 0 | Status: SHIPPED | OUTPATIENT
Start: 2024-03-26 | End: 2024-03-31

## 2024-03-27 ENCOUNTER — HOSPITAL ENCOUNTER (OUTPATIENT)
Age: 69
Discharge: HOME | End: 2024-03-27
Payer: MEDICARE

## 2024-03-27 ENCOUNTER — HOSPITAL ENCOUNTER (OUTPATIENT)
Dept: GENERAL RADIOLOGY | Age: 69
Discharge: HOME | End: 2024-03-27
Attending: NEUROLOGICAL SURGERY
Payer: MEDICARE

## 2024-03-27 PROCEDURE — 72100 X-RAY EXAM L-S SPINE 2/3 VWS: CPT

## 2024-04-15 RX ORDER — CLOMIPRAMINE HYDROCHLORIDE 25 MG/1
CAPSULE ORAL
Qty: 270 CAPSULE | Refills: 0 | Status: SHIPPED | OUTPATIENT
Start: 2024-04-15

## 2024-04-19 ENCOUNTER — OFFICE VISIT (OUTPATIENT)
Dept: FAMILY MEDICINE CLINIC | Age: 69
End: 2024-04-19
Payer: COMMERCIAL

## 2024-04-19 VITALS
WEIGHT: 223 LBS | OXYGEN SATURATION: 98 % | HEIGHT: 72 IN | HEART RATE: 93 BPM | DIASTOLIC BLOOD PRESSURE: 70 MMHG | BODY MASS INDEX: 30.2 KG/M2 | SYSTOLIC BLOOD PRESSURE: 150 MMHG

## 2024-04-19 DIAGNOSIS — I10 ESSENTIAL HYPERTENSION: Primary | ICD-10-CM

## 2024-04-19 DIAGNOSIS — F41.9 ANXIETY DUE TO INVASIVE PROCEDURE: ICD-10-CM

## 2024-04-19 PROCEDURE — 1123F ACP DISCUSS/DSCN MKR DOCD: CPT | Performed by: NURSE PRACTITIONER

## 2024-04-19 PROCEDURE — G8417 CALC BMI ABV UP PARAM F/U: HCPCS | Performed by: NURSE PRACTITIONER

## 2024-04-19 PROCEDURE — 3078F DIAST BP <80 MM HG: CPT | Performed by: NURSE PRACTITIONER

## 2024-04-19 PROCEDURE — G8427 DOCREV CUR MEDS BY ELIG CLIN: HCPCS | Performed by: NURSE PRACTITIONER

## 2024-04-19 PROCEDURE — 1036F TOBACCO NON-USER: CPT | Performed by: NURSE PRACTITIONER

## 2024-04-19 PROCEDURE — 99213 OFFICE O/P EST LOW 20 MIN: CPT | Performed by: NURSE PRACTITIONER

## 2024-04-19 PROCEDURE — 3017F COLORECTAL CA SCREEN DOC REV: CPT | Performed by: NURSE PRACTITIONER

## 2024-04-19 PROCEDURE — 3077F SYST BP >= 140 MM HG: CPT | Performed by: NURSE PRACTITIONER

## 2024-04-19 RX ORDER — ZOLPIDEM TARTRATE 10 MG/1
10 TABLET ORAL NIGHTLY
COMMUNITY
Start: 2024-03-21

## 2024-04-19 RX ORDER — KETOCONAZOLE 20 MG/G
CREAM TOPICAL
COMMUNITY
Start: 2024-03-20

## 2024-04-19 SDOH — ECONOMIC STABILITY: FOOD INSECURITY: WITHIN THE PAST 12 MONTHS, YOU WORRIED THAT YOUR FOOD WOULD RUN OUT BEFORE YOU GOT MONEY TO BUY MORE.: NEVER TRUE

## 2024-04-19 SDOH — ECONOMIC STABILITY: FOOD INSECURITY: WITHIN THE PAST 12 MONTHS, THE FOOD YOU BOUGHT JUST DIDN'T LAST AND YOU DIDN'T HAVE MONEY TO GET MORE.: NEVER TRUE

## 2024-04-19 SDOH — ECONOMIC STABILITY: INCOME INSECURITY: HOW HARD IS IT FOR YOU TO PAY FOR THE VERY BASICS LIKE FOOD, HOUSING, MEDICAL CARE, AND HEATING?: NOT HARD AT ALL

## 2024-04-19 ASSESSMENT — ENCOUNTER SYMPTOMS
SHORTNESS OF BREATH: 0
COUGH: 0
WHEEZING: 0
BACK PAIN: 1

## 2024-04-19 NOTE — PROGRESS NOTES
having multiple adjustments to medication due to uncontrolled hypertension.  Most recently metoprolol was changed to carvedilol.  Also takes hydrochlorothiazide.  At one point was on lisinopril but this did not work.  He states they have been getting better blood pressures at home on the carvedilol and hydrochlorothiazide.  Cardiology had mentioned that he may need an anxiety medication prior to procedures to help with his blood pressure.    Review of Systems   Constitutional:  Negative for chills and fever.   Respiratory:  Negative for cough, shortness of breath and wheezing.    Cardiovascular:  Negative for chest pain, palpitations and leg swelling.   Musculoskeletal:  Positive for back pain.   Neurological:  Negative for dizziness, weakness, light-headedness, numbness and headaches.   Psychiatric/Behavioral:  Negative for decreased concentration, dysphoric mood, self-injury, sleep disturbance and suicidal ideas. The patient is not nervous/anxious.        Physical Exam  Constitutional:       General: He is not in acute distress.     Appearance: Normal appearance. He is normal weight.   Cardiovascular:      Rate and Rhythm: Normal rate and regular rhythm.      Pulses: Normal pulses.      Heart sounds: Normal heart sounds. No murmur heard.     No gallop.   Pulmonary:      Effort: Pulmonary effort is normal.      Breath sounds: Normal breath sounds. No wheezing.   Neurological:      Mental Status: He is alert and oriented to person, place, and time.   Psychiatric:         Mood and Affect: Mood normal.         Behavior: Behavior normal.         Thought Content: Thought content normal.         Judgment: Judgment normal.               This dictation was generated by voice recognition computer software.  Although all attempts are made to edit the dictation for accuracy, there may be errors in the transcription that are not intended.    An electronic signature was used to authenticate this note.    --SAMPSON Denis -

## 2024-04-19 NOTE — PATIENT INSTRUCTIONS
pharmacy for medication refills even if current refills have been exhausted. If you are on a controlled medication you will be referred to a specialist (pain specialist, psychiatry, etc).   Forms: There is a $35 fee to fill out FMLA/Disability paperwork, payable at time of . Instead of the fee, you can choose to have the paperwork filled out during a separate office visit that is for filling out the paperwork only.  Medication Samples:  This office does not carry medication samples.  If you need assistance in getting your medications, then please let the medical assistant know so they can help you sign up for a drug assistance program that can help get medications at a reduced cost or even free (if you qualify).  Workman's Comp Claims: We do not handle workman's comp cases or claims. You will need to go to an urgent care to be seen or to whomever your employer uses.  General - Any abusive/rude behavior toward staff/providers may be cause for dismissal.      WE NOW OFFER [employer] SELF-SCHEDULING   IN 3 EASY STEPS    SCHEDULE AN APPT AT YOUR CONVENIENCE WITH NO HOLD/WAIT TIME  IN THE [employer] MAGDALENO SELECT 'SCHEDULE AN APPOINTMENT' FROM THE MENU  CHOOSE DATE/TIME THAT WORKS FOR YOU    If you don't find an appointment time that works for your schedule, you can also submit an appointment request thru [employer].    CONVENIENT QUALITY CARE AT YOUR FINGERTIPS    NOT ON [employer]?  PLEASE ASK ANY STAFF MEMBER You may receive a survey regarding the care you received during your visit.  Your input is valuable to us.  We encourage you to complete and return your survey.  We hope you will choose us in the future for your healthcare needs.

## 2024-04-23 ENCOUNTER — OFFICE VISIT (OUTPATIENT)
Dept: CARDIOLOGY CLINIC | Age: 69
End: 2024-04-23
Payer: MEDICARE

## 2024-04-23 VITALS
HEIGHT: 72 IN | HEART RATE: 76 BPM | SYSTOLIC BLOOD PRESSURE: 152 MMHG | WEIGHT: 228 LBS | DIASTOLIC BLOOD PRESSURE: 84 MMHG | BODY MASS INDEX: 30.88 KG/M2

## 2024-04-23 DIAGNOSIS — I10 ESSENTIAL HYPERTENSION: Primary | ICD-10-CM

## 2024-04-23 PROCEDURE — 1036F TOBACCO NON-USER: CPT | Performed by: NURSE PRACTITIONER

## 2024-04-23 PROCEDURE — G8417 CALC BMI ABV UP PARAM F/U: HCPCS | Performed by: NURSE PRACTITIONER

## 2024-04-23 PROCEDURE — 3079F DIAST BP 80-89 MM HG: CPT | Performed by: NURSE PRACTITIONER

## 2024-04-23 PROCEDURE — 3077F SYST BP >= 140 MM HG: CPT | Performed by: NURSE PRACTITIONER

## 2024-04-23 PROCEDURE — 3017F COLORECTAL CA SCREEN DOC REV: CPT | Performed by: NURSE PRACTITIONER

## 2024-04-23 PROCEDURE — 99215 OFFICE O/P EST HI 40 MIN: CPT | Performed by: NURSE PRACTITIONER

## 2024-04-23 PROCEDURE — G8427 DOCREV CUR MEDS BY ELIG CLIN: HCPCS | Performed by: NURSE PRACTITIONER

## 2024-04-23 PROCEDURE — 1123F ACP DISCUSS/DSCN MKR DOCD: CPT | Performed by: NURSE PRACTITIONER

## 2024-04-23 RX ORDER — LOSARTAN POTASSIUM 50 MG/1
50 TABLET ORAL DAILY
Qty: 90 TABLET | Refills: 3 | Status: SHIPPED | OUTPATIENT
Start: 2024-04-23

## 2024-04-23 RX ORDER — DIAZEPAM 5 MG/1
5 TABLET ORAL ONCE
Qty: 1 TABLET | Refills: 0 | Status: SHIPPED | OUTPATIENT
Start: 2024-04-23 | End: 2024-04-23

## 2024-04-23 NOTE — PROGRESS NOTES
[facility]     Outpatient Follow Up Note  Dr Winston Arciniega MD,  FACC   Afsaneh Lepe RN, APRN,CNP CVNP      CHIEF COMPLAINT / HPI:  Manny Mccabe is 69 y.o. male who presents today with   CAD cath  with 20 to 30% mid LAD  HTN / HLD / hypothyroidism     Today: b/p elevated  to 150/93 an his epidural was delayed until his b/p has improved   no c/o cp SOB no c/o PND denies RODRI   He is on HCTZ and coreg  Add losartan 50mg daily   Valium 5mg x one   Fu with b/p check wants to see his PCP   Recommend no NSAIDS  he is taking Advil recommend tylenol prn / has back problems herniated discs and stenosis   Discussed meds diet fluids activity   Fu in 6 months with blood work PTV      I spent a total of 60  minutes and greater than 50% of the time was spent counseling with patient  coordinating care regarding her diagnosis, reviewing labs, cardiac work up, treatments and plan of care.    Past Medical History:   Diagnosis Date    Anxiety     BPH (benign prostatic hyperplasia)     Chronic back pain     Colon polyps     Coronary atherosclerosis 05/10/2019    cath, LAD had 20-25% narrowing but required no intervention other than maximizing medical tx.     COVID-19 virus infection     Dermatomyositis (HCC)     ED (erectile dysfunction)     History of skin cancer     Homocysteinemia 2021    Homocystine 20 on 21.    Hyperlipidemia     Hypertension     Hypothyroidism     Insomnia     Osteoarthritis     Right bundle branch block left axis -bifascicular block 2019    -Right bundle branch block with .    Spinal cord tumor     Testosterone deficiency     Wears glasses      Social History:    Social History     Tobacco Use   Smoking Status Former    Current packs/day: 0.00    Average packs/day: 1 pack/day for 33.0 years (33.0 ttl pk-yrs)    Types: Cigarettes    Start date: 1972    Quit date:     Years since quittin.3   Smokeless Tobacco Never     Current Medications:  Current

## 2024-05-21 ENCOUNTER — TELEPHONE (OUTPATIENT)
Dept: FAMILY MEDICINE CLINIC | Age: 69
End: 2024-05-21

## 2024-05-21 DIAGNOSIS — F51.04 PSYCHOPHYSIOLOGICAL INSOMNIA: Primary | Chronic | ICD-10-CM

## 2024-05-21 RX ORDER — ZOLPIDEM TARTRATE 10 MG/1
10 TABLET ORAL NIGHTLY
Qty: 30 TABLET | Refills: 0 | Status: SHIPPED | OUTPATIENT
Start: 2024-05-21 | End: 2024-06-20

## 2024-06-21 ENCOUNTER — E-VISIT (OUTPATIENT)
Dept: FAMILY MEDICINE CLINIC | Age: 69
End: 2024-06-21

## 2024-06-21 DIAGNOSIS — G47.00 INSOMNIA, UNSPECIFIED TYPE: Chronic | ICD-10-CM

## 2024-06-23 RX ORDER — ZOLPIDEM TARTRATE 10 MG/1
TABLET ORAL
Qty: 90 TABLET | Refills: 0 | Status: SHIPPED | OUTPATIENT
Start: 2024-06-23 | End: 2024-09-22

## 2024-07-10 NOTE — TELEPHONE ENCOUNTER
Patient was calling to get a medication refilled     Ambien patient does not know, 1x nightly, 30 days worth     [facility] pharmacy   Phone number    limitations: WFL    Strength Assessment (measured on a 0-5 scale):  R LE   Quad   4-   Ant Tib  4-   Hamstring 4-   Iliopsoas 4-  L LE  Quad   4-   Ant Tib  4-   Hamstring 4-   Iliopsoas 4-    Lower Extremity Sensation    WFL    Coordination  WFL    Tone  WNL    Balance  Static Sitting:  Fair +; SBA  Dynamic Sitting:  Fair +; CGA   Comments: EOB, no LOB noted    Static Standing: Fair +; CGA  Dynamic Standing: Fair +; CGA  Comments: Standing with RW, no LOB    Posture  Seated: Forward head and neck and rounded shoulders  Standing: Forward head and neck, rounded shoulders    Bed Mobility   Supine to Sit:    Min A ; HOB elevated  Sit to Supine:   Not Tested  Rolling:   Not Tested   Scooting in sitting: CGA   Scooting in supine:  Not Tested   Bridging:  Not Tested    Transfer Training     Sit to stand:   Min A  x2 from EOB     CGA from recliner  Stand to sit:   CGA from EOB  Bed to/from Chair:  CGA with use of gait belt and rolling walker (RW)    Gait gait completed as indicated below  Distance:      50 ft (25ft to walk to the bathroom sink and 25ft back to the recliner chair)  Deviations (firm surface/linoleum):  decreased stephanie  Assistive Device Used:    gait belt and rolling walker (RW)  Level of Assist:    CGA   Comment: No LOB, slow movement    Stair Training deferred, pt does not have stairs in home environment    Therapeutic Exercises Initiated  deferred secondary to treatment focus on functional mobility  Supine:  N/A    Seated:  N/A    Standing:  N/A      Positioning Needs   Pt reclined in chair, alarm set, positioned in proper neutral alignment and pressure relief provided.   Call light provided and all needs within reach  RN aware of pt position/status; telesitter    Other Activities  Refer to OT note. Brushing teeth at sink.    Patient/Family Education   Pt educated on role of inpatient PT, POC, importance of continued activity, DC recommendations, safety awareness, transfer techniques, and calling for  assist with mobility.      Assessment  Pt seen today for physical therapy Evaluation & Treatment. Pt demonstrated decreased Activity tolerance, Balance, Safety, and Strength as well as decreased independence with Ambulation, Bed Mobility , and Transfers.     Pt has a recent hx of intermittent confusion. He required increased assistance with standing from the EOB, decreased assistance from recliner chair. Overall he demonstrates slow movement and required RW for support. He would benefit from continued therapy services to address functional deficits in order to safely return to PLOF abilities.    Recommending SNF upon discharge as patient functioning well below baseline, demonstrates good rehab potential and unable to return home due to limited or no family support, burden of care beyond caregiver ability, home environment not conducive to patient recovery, and limited safety awareness.    Goals :   To be met in 3 visits:  1). Independent with LE Ex x 10 reps  2). Sit to/from stand: SBA  3). Bed to chair: SBA  4). CHF goal: N/A    To be met in 6 visits:  1).  Supine to/from sit: Independent  2).  Sit to/from stand: Independent  3).  Bed to chair: Independent  4).  Gait: Ambulate 150 ft.  with Independent and use of rolling walker (RW)  5).  Tolerate B LE exercises 3 sets of 10-15 reps    Rehabilitation Potential: Good  Strengths for achieving goals include:   Pt motivated, PLOF, and Pt cooperative   Barriers to achieving goals include:    Complexity of condition and Chronicity of condition    Plan    To be seen 3-5 x/ week while in acute care setting for therapeutic exercises, bed mobility, transfers, progressive gait training, balance training, and family/patient education.    Signature: Jose Rodrigez, PT     If patient discharges from this facility prior to next visit, this note will serve as the Discharge Summary.    CHF Education  N/A

## 2024-07-18 RX ORDER — CLOMIPRAMINE HYDROCHLORIDE 25 MG/1
CAPSULE ORAL
Qty: 270 CAPSULE | Refills: 0 | Status: SHIPPED | OUTPATIENT
Start: 2024-07-18

## 2024-07-22 NOTE — PROGRESS NOTES
PATIENT REACHED   YES____NO__x__    PREOP INSTRUCTIONS LEFT ON VM KUWOKO__[phone]_____________      DATE_07/25/24________ TIME_1230________ARRIVAL_1130_______PLACE__[address] [place] [address]__________  NOTHING TO EAT OR DRINK  AFTER MIDNIGHT THE EVENING PRIOR OR AS INSTRUCTED BY YOUR DR.  YOU NEED A RESPONSIBLE ADULT AGE 18 OR OLDER TO DRIVE YOU HOME  PLEASE BRING INSURANCE CARD.PICTURE ID AND COMPLETE LIST OF MEDS  WEAR LOOSE COMFORTABLE CLOTHING  FOLLOW ANY INSTRUCTIONS YOUR DRS OFFICE HAS GIVEN YOU,INCLUDING WHAT MEDICATIONS TO TAKE THE AM OF PROCEDURE AND WHEN AND IF YOU NEED TO STOP ANY BLOOD THINNERS. IF YOU HAVE QUESTIONS REGARDING THIS CALL THE OFFICE  THE GOAL BLOOD SUGAR THE AM OF PROCEDURE  OR LESS ABOVE THAT THE PROCEDURE MAY BE CANCELLED  ANY QUESTIONS CALL YOUR DOCTOR.ALSO,PLEASE READ THE INSTRUCTION PACKET FROM YOUR DR IF YOU RECEIVED ONE.  SPINE INTERVENTION NUMBER [phone]      OTHER___________________________________      VISITOR POLICY(subject to change)    Current policy is 2 visitors per patient. No children. Masks are required.

## 2024-07-25 ENCOUNTER — HOSPITAL ENCOUNTER (OUTPATIENT)
Age: 69
Setting detail: OUTPATIENT SURGERY
Discharge: HOME | End: 2024-07-25
Attending: PHYSICAL MEDICINE & REHABILITATION | Admitting: PHYSICAL MEDICINE & REHABILITATION
Payer: MEDICARE

## 2024-07-25 ENCOUNTER — APPOINTMENT (OUTPATIENT)
Dept: GENERAL RADIOLOGY | Age: 69
End: 2024-07-25
Attending: PHYSICAL MEDICINE & REHABILITATION
Payer: MEDICARE

## 2024-07-25 VITALS
WEIGHT: 220 LBS | SYSTOLIC BLOOD PRESSURE: 151 MMHG | DIASTOLIC BLOOD PRESSURE: 100 MMHG | HEIGHT: 72 IN | TEMPERATURE: 96.9 F | BODY MASS INDEX: 29.8 KG/M2 | RESPIRATION RATE: 16 BRPM | OXYGEN SATURATION: 96 % | HEART RATE: 73 BPM

## 2024-07-25 PROCEDURE — 2500000003 HC RX 250 WO HCPCS: Performed by: PHYSICAL MEDICINE & REHABILITATION

## 2024-07-25 PROCEDURE — 3610000054 HC PAIN LEVEL 3 BASE (NON-OR): Performed by: PHYSICAL MEDICINE & REHABILITATION

## 2024-07-25 PROCEDURE — 6360000002 HC RX W HCPCS: Performed by: PHYSICAL MEDICINE & REHABILITATION

## 2024-07-25 PROCEDURE — 77003 FLUOROGUIDE FOR SPINE INJECT: CPT

## 2024-07-25 PROCEDURE — 99152 MOD SED SAME PHYS/QHP 5/>YRS: CPT | Performed by: PHYSICAL MEDICINE & REHABILITATION

## 2024-07-25 PROCEDURE — 3610000055 HC PAIN LEVEL 3 ADDL 15 MIN (NON-OR): Performed by: PHYSICAL MEDICINE & REHABILITATION

## 2024-07-25 PROCEDURE — 2709999900 HC NON-CHARGEABLE SUPPLY: Performed by: PHYSICAL MEDICINE & REHABILITATION

## 2024-07-25 RX ORDER — FENTANYL CITRATE 50 UG/ML
INJECTION, SOLUTION INTRAMUSCULAR; INTRAVENOUS
Status: COMPLETED | OUTPATIENT
Start: 2024-07-25 | End: 2024-07-25

## 2024-07-25 RX ORDER — LIDOCAINE HYDROCHLORIDE 10 MG/ML
INJECTION, SOLUTION EPIDURAL; INFILTRATION; INTRACAUDAL; PERINEURAL
Status: COMPLETED | OUTPATIENT
Start: 2024-07-25 | End: 2024-07-25

## 2024-07-25 RX ORDER — ACETAMINOPHEN 500 MG
1000 TABLET ORAL EVERY 6 HOURS PRN
COMMUNITY

## 2024-07-25 RX ORDER — MIDAZOLAM HYDROCHLORIDE 1 MG/ML
INJECTION INTRAMUSCULAR; INTRAVENOUS
Status: COMPLETED | OUTPATIENT
Start: 2024-07-25 | End: 2024-07-25

## 2024-07-25 RX ORDER — DEXAMETHASONE SODIUM PHOSPHATE 10 MG/ML
INJECTION, SOLUTION INTRAMUSCULAR; INTRAVENOUS
Status: COMPLETED | OUTPATIENT
Start: 2024-07-25 | End: 2024-07-25

## 2024-07-25 ASSESSMENT — PAIN - FUNCTIONAL ASSESSMENT
PAIN_FUNCTIONAL_ASSESSMENT: 0-10

## 2024-07-25 NOTE — OP NOTE
PATIENT:  Manny Mccabe  AGE:  69 yrs  MEDICAL RECORD #:  [id number]  YOB: 1955     DATE:  7/25/2024  PHYSICIAN: Lino Aguilar M.D.     PROCEDURE: L5-S1 interlaminar epidural steroid injection under fluoroscopy.     PRE-OP DIAGNOSIS:  Lumbar Pain/Radiculopathy     POST-OP DIAGNOSIS:  same     HISTORY OF PRESENT ILLNESS:  See office notes. Patient has failed previous less-invasive treatments.     ALLERGIES:  Patient has no known allergies.     MEDICATIONS:    No current facility-administered medications for this encounter.        PHYSICAL EXAMINATION:              General:  Awake, alert              Heart:  No audible murmurs, extremities well perfused              Lungs:  No increased WOB or audible wheezing              Extremities:  Normal tone. Warm. No swelling.      Anesthesia: 1 mg Versed and 50 mcg fentanyl    Estimated blood loss: None  Complications: None  Specimen: None    DESCRIPTION OF PROCEDURE:     Components of the procedure were again reviewed with the patient prior to the procedure.  He is aware of risks including infection, bleeding, allergic reaction, and nerve injury.  He had ample opportunity for additional questions.  He elected to proceed with treatment.     The patient was placed in the prone position.  Cardiovascular monitoring was initiated, and vital signs were stable prior to, during, and after the procedure.  Utilizing fluoroscopy, the L4,5,S1 vertebrae were identified.  The area was sterilely prepped and draped. Skin anesthesia was achieved at the entry site using 1-2 cc of Lidocaine 1%. A 22 g 3.5 inch Touhy spinal needle was slowly inserted into the L5-S1 interlaminar epidural space using AP, lateral and oblique fluoroscopic imaging and loss of resistance technique. Negative aspiration was confirmed. 1 cc Isovue-M 300 was injected showing contrast spread into the epidural space.  A combination of 2 cc 1% lidocaine and 20 mg dexamethasone were slowly injected. The  needle was removed after the stylet was repositioned. A sterile bandage was applied.  The patient was brought to recovery in stable condition.The patient tolerated the procedure well.     DISPOSITION:  The patient was transported to recovery.  The patient was monitored for 15 to 20 minutes post-procedure.  Precautions were discussed and written instructions provided.    Comment: L4-5 right paramedian unavailable after multiple attempts. Adjusted to L5-S1 Right paramedian without difficulty.

## 2024-07-25 NOTE — PROGRESS NOTES
IV discontinued, catheter intact, and dressing applied.    Procedural dressing dry and intact.    Bilateral lower extremities equal in strength.    Discharge instructions reviewed with patient or responsible adult, signed and copy given.  All home medications have been reviewed.  All questions answered and patient or responsible adult verbalized understanding.

## 2024-07-25 NOTE — H&P
OR    LUMBAR LAMINECTOMY  2008    L1-L2    NASAL SEPTUM SURGERY  1976    NERVE BIOPSY  2011    Spine:  myxopapillary ependymoma     PROSTATE SURGERY N/A 03/01/2021    UROLIFT at urology center [place]. Dr Cordoba.    TYMPANOSTOMY TUBE PLACEMENT Bilateral 2017    UPPER GASTROINTESTINAL ENDOSCOPY  09/13/2012    Dr. Cortes       Current Medications:   Prior to Admission medications    Medication Sig Start Date End Date Taking? Authorizing Provider   acetaminophen (TYLENOL) 500 MG tablet Take 2 tablets by mouth every 6 hours as needed for Pain   Yes Nanci Lacey MD   clomiPRAMINE (ANAFRANIL) 25 MG capsule TAKE 3 CAPSULES BY MOUTH EVERY EVENING 7/18/24   Amparo Mendez APRN - CNP   zolpidem (AMBIEN) 10 MG tablet TAKE 1/2 TO 1 TABLET BY MOUTH NIGHTLY AS NEEDED FOR SLEEP 6/23/24 9/22/24  Amparo Mendez APRN - CNP   losartan (COZAAR) 50 MG tablet Take 1 tablet by mouth daily 4/23/24   Afsaneh Lepe APRN - CNP   hydrocortisone 2.5 % cream APPLY EXTERNALLY TO THE AFFECTED AREA ON FACE TWICE DAILY AS NEEDED 3/21/24   Nanci Lacey MD   ketoconazole (NIZORAL) 2 % cream APPLY TOPICALLY TO THE AFFECTED AREA OF FACE EVERY DAY 3/20/24   Nanci Lacey MD   carvedilol (COREG) 25 MG tablet Take 1 tablet by mouth 2 times daily 3/11/24   Afsaneh Lepe APRN - CNP   hydroCHLOROthiazide (HYDRODIURIL) 25 MG tablet TAKE ONE TABLET BY MOUTH ONE TIME A DAY **NEED APPOINTMENT FOR FURTHER FILLS** 3/11/24   Afsaneh Lepe APRN - CNP   atorvastatin (LIPITOR) 40 MG tablet TAKE ONE TABLET BY MOUTH ONCE A DAY 3/11/24   Afsaneh Lepe APRN - CNP   tamsulosin (FLOMAX) 0.4 MG capsule Take 1 capsule by mouth at bedtime 3/21/23   Nanci Lacey MD   clobetasol (TEMOVATE) 0.05 % cream Apply topically 2 times daily Apply topically 2 times daily.  Patient taking differently: Apply topically as needed Apply topically 2 times daily. 6/14/21   Pedro Dias DO   thyroid (ARMOUR) 130 MG tablet Take 1  visible)      Sedation plan:   []  Local              [x]  Minimal                  []  General anesthesia    Treatment plan:  Patient's condition acceptable for planned procedure/sedation.  Proceed with planned procedure   Post Procedure Plan   Return to same level of care   ______________________     The risks and benefits as well as alternatives to the procedure have been discussed with the patient and or family.  The patient and/or next of kin understands and agrees to proceed.    Lino Aguilar MD

## 2024-09-23 DIAGNOSIS — G47.00 INSOMNIA, UNSPECIFIED TYPE: Chronic | ICD-10-CM

## 2024-09-23 RX ORDER — ZOLPIDEM TARTRATE 10 MG/1
TABLET ORAL
Qty: 90 TABLET | Refills: 0 | Status: SHIPPED | OUTPATIENT
Start: 2024-09-23 | End: 2024-12-23

## 2024-09-23 RX ORDER — ZOLPIDEM TARTRATE 10 MG/1
TABLET ORAL
Qty: 90 TABLET | Refills: 0 | OUTPATIENT
Start: 2024-09-23 | End: 2024-12-23

## 2024-09-23 NOTE — TELEPHONE ENCOUNTER
Patient was calling to get his medication refilled     Ambien 10MG he is not sure, 1x nightly, 30 Days     [facility]   Phone Number

## 2024-10-23 RX ORDER — CLOMIPRAMINE HYDROCHLORIDE 25 MG/1
CAPSULE ORAL
Qty: 270 CAPSULE | Refills: 0 | Status: SHIPPED | OUTPATIENT
Start: 2024-10-23

## 2024-11-12 ENCOUNTER — OFFICE VISIT (OUTPATIENT)
Dept: FAMILY MEDICINE CLINIC | Age: 69
End: 2024-11-12

## 2024-11-12 VITALS
HEART RATE: 86 BPM | RESPIRATION RATE: 16 BRPM | SYSTOLIC BLOOD PRESSURE: 134 MMHG | DIASTOLIC BLOOD PRESSURE: 82 MMHG | BODY MASS INDEX: 30.48 KG/M2 | OXYGEN SATURATION: 97 % | WEIGHT: 225 LBS | HEIGHT: 72 IN

## 2024-11-12 DIAGNOSIS — Z13.6 ENCOUNTER FOR ABDOMINAL AORTIC ANEURYSM (AAA) SCREENING: ICD-10-CM

## 2024-11-12 DIAGNOSIS — G47.00 INSOMNIA, UNSPECIFIED TYPE: Primary | Chronic | ICD-10-CM

## 2024-11-12 RX ORDER — ZOLPIDEM TARTRATE 10 MG/1
TABLET ORAL
Qty: 90 TABLET | Refills: 1 | Status: SHIPPED | OUTPATIENT
Start: 2024-12-23 | End: 2025-02-11

## 2024-11-12 NOTE — PROGRESS NOTES
Manny Mccabe (:  1955) is a 69 y.o. male,Established patient, here for evaluation of the following chief complaint(s):    Insomnia (Insomnia f/u)      SUBJECTIVE/OBJECTIVE:  HPI  Routine insomnia follow up  He takes 0.5 tab of the ambien nightly   Helps him stay asleep  Has not had any issues with the medication      Review of Systems   Psychiatric/Behavioral:  Positive for sleep disturbance.        Physical Exam  Vitals reviewed.   Constitutional:       General: He is awake. He is not in acute distress.     Appearance: Normal appearance. He is well-developed and well-groomed. He is not ill-appearing or toxic-appearing.   Neurological:      Mental Status: He is alert and oriented to person, place, and time.   Psychiatric:         Attention and Perception: Attention and perception normal.         Mood and Affect: Mood and affect normal.         Speech: Speech normal.         Behavior: Behavior normal. Behavior is cooperative.         Thought Content: Thought content normal.         Cognition and Memory: Cognition and memory normal.         ASSESSMENT/PLAN:  2. Insomnia, unspecified type  Stable on current medication  Controlled substances monitoring: possible medication side effects, risk of tolerance and/or dependence, and alternative treatments discussed, no signs of potential drug abuse or diversion identified, and [place]ARRS report reviewed today- activity consistent with treatment plan.   -     zolpidem (AMBIEN) 10 MG tablet; TAKE 1/2 TO 1 TABLET BY MOUTH NIGHTLY AS NEEDED FOR SLEEP, Disp-90 tablet, R-1Normal  1. Encounter for abdominal aortic aneurysm (AAA) screening  -     US SCREENING FOR AAA; Future        Return in about 6 years (around 2030), or [facility] asap.          An electronic signature was used to authenticate this note.    --Amparo Mendez, APRN - CNP

## 2024-11-20 RX ORDER — METHYLPREDNISOLONE 4 MG/1
TABLET ORAL
Qty: 1 KIT | Refills: 0 | Status: SHIPPED | OUTPATIENT
Start: 2024-11-20

## 2025-01-14 ENCOUNTER — OFFICE VISIT (OUTPATIENT)
Dept: FAMILY MEDICINE CLINIC | Age: 70
End: 2025-01-14
Payer: MEDICARE

## 2025-01-14 VITALS
OXYGEN SATURATION: 100 % | DIASTOLIC BLOOD PRESSURE: 80 MMHG | SYSTOLIC BLOOD PRESSURE: 130 MMHG | WEIGHT: 229.2 LBS | BODY MASS INDEX: 31.04 KG/M2 | HEART RATE: 79 BPM | HEIGHT: 72 IN

## 2025-01-14 DIAGNOSIS — Z13.1 DIABETES MELLITUS SCREENING: ICD-10-CM

## 2025-01-14 DIAGNOSIS — Z00.00 INITIAL MEDICARE ANNUAL WELLNESS VISIT: Primary | ICD-10-CM

## 2025-01-14 DIAGNOSIS — I10 ESSENTIAL HYPERTENSION: ICD-10-CM

## 2025-01-14 DIAGNOSIS — Z13.220 LIPID SCREENING: ICD-10-CM

## 2025-01-14 DIAGNOSIS — C43.61 MELANOMA OF RIGHT UPPER ARM (HCC): ICD-10-CM

## 2025-01-14 DIAGNOSIS — M33.13 DERMATOMYOSITIS (HCC): ICD-10-CM

## 2025-01-14 LAB
ALBUMIN SERPL-MCNC: 4.5 G/DL (ref 3.4–5)
ALBUMIN/GLOB SERPL: 2 {RATIO} (ref 1.1–2.2)
ALP SERPL-CCNC: 85 U/L (ref 40–129)
ALT SERPL-CCNC: 42 U/L (ref 10–40)
ANION GAP SERPL CALCULATED.3IONS-SCNC: 12 MMOL/L (ref 3–16)
AST SERPL-CCNC: 27 U/L (ref 15–37)
BILIRUB SERPL-MCNC: 0.9 MG/DL (ref 0–1)
BUN SERPL-MCNC: 17 MG/DL (ref 7–20)
CALCIUM SERPL-MCNC: 10.2 MG/DL (ref 8.3–10.6)
CHLORIDE SERPL-SCNC: 95 MMOL/L (ref 99–110)
CHOLEST SERPL-MCNC: 173 MG/DL (ref 0–199)
CO2 SERPL-SCNC: 29 MMOL/L (ref 21–32)
CREAT SERPL-MCNC: 1.3 MG/DL (ref 0.8–1.3)
GFR SERPLBLD CREATININE-BSD FMLA CKD-EPI: 59 ML/MIN/{1.73_M2}
GLUCOSE SERPL-MCNC: 107 MG/DL (ref 70–99)
HDLC SERPL-MCNC: 40 MG/DL (ref 40–60)
LDL CHOLESTEROL: 88 MG/DL
POTASSIUM SERPL-SCNC: 4.1 MMOL/L (ref 3.5–5.1)
PROT SERPL-MCNC: 6.8 G/DL (ref 6.4–8.2)
SODIUM SERPL-SCNC: 136 MMOL/L (ref 136–145)
TRIGL SERPL-MCNC: 225 MG/DL (ref 0–150)
VLDLC SERPL CALC-MCNC: 45 MG/DL

## 2025-01-14 PROCEDURE — 3017F COLORECTAL CA SCREEN DOC REV: CPT | Performed by: NURSE PRACTITIONER

## 2025-01-14 PROCEDURE — 3075F SYST BP GE 130 - 139MM HG: CPT | Performed by: NURSE PRACTITIONER

## 2025-01-14 PROCEDURE — 1160F RVW MEDS BY RX/DR IN RCRD: CPT | Performed by: NURSE PRACTITIONER

## 2025-01-14 PROCEDURE — 1159F MED LIST DOCD IN RCRD: CPT | Performed by: NURSE PRACTITIONER

## 2025-01-14 PROCEDURE — 1123F ACP DISCUSS/DSCN MKR DOCD: CPT | Performed by: NURSE PRACTITIONER

## 2025-01-14 PROCEDURE — 3079F DIAST BP 80-89 MM HG: CPT | Performed by: NURSE PRACTITIONER

## 2025-01-14 PROCEDURE — 36415 COLL VENOUS BLD VENIPUNCTURE: CPT | Performed by: NURSE PRACTITIONER

## 2025-01-14 PROCEDURE — G0438 PPPS, INITIAL VISIT: HCPCS | Performed by: NURSE PRACTITIONER

## 2025-01-14 SDOH — ECONOMIC STABILITY: FOOD INSECURITY: WITHIN THE PAST 12 MONTHS, YOU WORRIED THAT YOUR FOOD WOULD RUN OUT BEFORE YOU GOT MONEY TO BUY MORE.: NEVER TRUE

## 2025-01-14 SDOH — ECONOMIC STABILITY: FOOD INSECURITY: WITHIN THE PAST 12 MONTHS, THE FOOD YOU BOUGHT JUST DIDN'T LAST AND YOU DIDN'T HAVE MONEY TO GET MORE.: NEVER TRUE

## 2025-01-14 ASSESSMENT — PATIENT HEALTH QUESTIONNAIRE - PHQ9
SUM OF ALL RESPONSES TO PHQ QUESTIONS 1-9: 0
SUM OF ALL RESPONSES TO PHQ QUESTIONS 1-9: 0
SUM OF ALL RESPONSES TO PHQ9 QUESTIONS 1 & 2: 0
SUM OF ALL RESPONSES TO PHQ QUESTIONS 1-9: 0
2. FEELING DOWN, DEPRESSED OR HOPELESS: NOT AT ALL
SUM OF ALL RESPONSES TO PHQ QUESTIONS 1-9: 0
1. LITTLE INTEREST OR PLEASURE IN DOING THINGS: NOT AT ALL

## 2025-01-14 ASSESSMENT — LIFESTYLE VARIABLES
HOW OFTEN DURING THE LAST YEAR HAVE YOU NEEDED AN ALCOHOLIC DRINK FIRST THING IN THE MORNING TO GET YOURSELF GOING AFTER A NIGHT OF HEAVY DRINKING: NEVER
HAS A RELATIVE, FRIEND, DOCTOR, OR ANOTHER HEALTH PROFESSIONAL EXPRESSED CONCERN ABOUT YOUR DRINKING OR SUGGESTED YOU CUT DOWN: NO
HOW OFTEN DURING THE LAST YEAR HAVE YOU FOUND THAT YOU WERE NOT ABLE TO STOP DRINKING ONCE YOU HAD STARTED: NEVER
HOW OFTEN DURING THE LAST YEAR HAVE YOU HAD A FEELING OF GUILT OR REMORSE AFTER DRINKING: NEVER
HOW OFTEN DO YOU HAVE A DRINK CONTAINING ALCOHOL: 2-3 TIMES A WEEK
HOW OFTEN DURING THE LAST YEAR HAVE YOU BEEN UNABLE TO REMEMBER WHAT HAPPENED THE NIGHT BEFORE BECAUSE YOU HAD BEEN DRINKING: NEVER
HOW OFTEN DURING THE LAST YEAR HAVE YOU FAILED TO DO WHAT WAS NORMALLY EXPECTED FROM YOU BECAUSE OF DRINKING: NEVER
HOW MANY STANDARD DRINKS CONTAINING ALCOHOL DO YOU HAVE ON A TYPICAL DAY: 3 OR 4
HAVE YOU OR SOMEONE ELSE BEEN INJURED AS A RESULT OF YOUR DRINKING: NO

## 2025-01-14 NOTE — PATIENT INSTRUCTIONS
You may receive a survey regarding the care you received during your visit.  Your input is valuable to us.  We encourage you to complete and return your survey.  We hope you will choose us in the future for your healthcare needs.        Hearing Loss: Care Instructions  Overview     Hearing loss is a sudden or slow decrease in how well you hear. It can range from slight to profound. Permanent hearing loss can occur with aging. It also can happen when you are exposed long-term to loud noise. Examples include listening to loud music, riding motorcycles, or being around other loud machines.  Hearing loss can affect your work and home life. It can make you feel lonely or depressed. You may feel that you have lost your independence. But hearing aids and other devices can help you hear better and feel connected to others.  Follow-up care is a key part of your treatment and safety. Be sure to make and go to all appointments, and call your doctor if you are having problems. It's also a good idea to know your test results and keep a list of the medicines you take.  How can you care for yourself at home?  Avoid loud noises whenever possible. This helps keep your hearing from getting worse.  Always wear hearing protection around loud noises.  Wear a hearing aid as directed.  A professional can help you pick a hearing aid that will work best for you.  You can also get hearing aids over the counter for mild to moderate hearing loss.  Have hearing tests as your doctor suggests. They can show whether your hearing has changed. Your hearing aid may need to be adjusted.  Use other devices as needed. These may include:  Telephone amplifiers and hearing aids that can connect to a television, stereo, radio, or microphone.  Devices that use lights or vibrations. These alert you to the doorbell, a ringing telephone, or a baby monitor.  Television closed-captioning. This shows the words at the bottom of the screen. Most new TVs can do

## 2025-01-14 NOTE — PROGRESS NOTES
Medicare Annual Wellness Visit    Manny Mccabe is here for Medicare AWV (MEDICARE ANNUAL WELLNESS VISIT)    Manny was seen today for medicare awv.    Diagnoses and all orders for this visit:    Initial Medicare annual wellness visit  Patient's complete Health Risk Assessment and screening values have been reviewed and are found in Flowsheets. The following problems were reviewed today and where indicated follow up appointments were made and/or referrals ordered.  Dermatomyositis (HCC)  Melanoma of right upper arm (HCC)  Followed by dermatology  Essential hypertension  Stable on current medication  Followed by cardiology  Lipid screening  -     Lipid, Fasting; Future  -     Lipid, Fasting    Diabetes mellitus screening  -     Comprehensive Metabolic Panel; Future  -     Comprehensive Metabolic Panel          Subjective   The following acute and/or chronic problems were also addressed today:      Patient's complete Health Risk Assessment and screening values have been reviewed and are found in Flowsheets. The following problems were reviewed today and where indicated follow up appointments were made and/or referrals ordered.    Positive Risk Factor Screenings with Interventions:       Alcohol Screening:  AUDIT-C Score: 4  AUDIT Total Score: 4  Total Score Interpretation: 0-7 suggests low risk alcohol consumption but assess individual risks     Interventions:  See counseling/recommendations below                Abnormal BMI (obese):  Body mass index is 31.09 kg/m². (!) Abnormal    Interventions:  Exercises at least 2-3 x weekly      Hearing Screen:  Do you or your family notice any trouble with your hearing that hasn't been managed with hearing aids?: (!) Yes    Interventions:  Patient declines any further evaluation or treatment    Vision Screen:  Do you have difficulty driving, watching TV, or doing any of your daily activities because of your eyesight?: No  Have you had an eye exam within the past year?: (!)

## 2025-01-16 RX ORDER — CLOMIPRAMINE HYDROCHLORIDE 25 MG/1
CAPSULE ORAL
Qty: 270 CAPSULE | Refills: 1 | Status: SHIPPED | OUTPATIENT
Start: 2025-01-16

## 2025-02-18 ENCOUNTER — HOSPITAL ENCOUNTER (OUTPATIENT)
Dept: GENERAL RADIOLOGY | Age: 70
Discharge: HOME | End: 2025-02-18
Attending: NEUROLOGICAL SURGERY
Payer: MEDICARE

## 2025-02-18 ENCOUNTER — TRANSCRIBE ORDERS (OUTPATIENT)
Dept: GENERAL RADIOLOGY | Age: 70
End: 2025-02-18

## 2025-02-18 ENCOUNTER — HOSPITAL ENCOUNTER (OUTPATIENT)
Age: 70
Discharge: HOME | End: 2025-02-18
Payer: MEDICARE

## 2025-02-18 PROCEDURE — 72110 X-RAY EXAM L-2 SPINE 4/>VWS: CPT

## 2025-02-25 RX ORDER — HYDROCHLOROTHIAZIDE 25 MG/1
TABLET ORAL
Qty: 90 TABLET | Refills: 0 | Status: SHIPPED | OUTPATIENT
Start: 2025-02-25

## 2025-02-25 NOTE — TELEPHONE ENCOUNTER
Requested Prescriptions     Pending Prescriptions Disp Refills    hydroCHLOROthiazide (HYDRODIURIL) 25 MG tablet [Pharmacy Med Name: HYDROCHLOROTHIAZIDE 25MGTABLETS] 90 tablet 0     Sig: TAKE 1 TABLET BY MOUTH DAILY            Checked Correct Pharmacy: Yes    Any changes since last refill? No     Number: 9 Refills: 0    Last OV: 4/23/2024 Provider: JNH    Next OV: Visit date not found Provider:     Last Labs:   CMP:   Lab Results   Component Value Date     01/14/2025    K 4.1 01/14/2025    CL 95 (L) 01/14/2025    CO2 29 01/14/2025    BUN 17 01/14/2025    CREATININE 1.3 01/14/2025    GLUCOSE 107 (H) 01/14/2025    CALCIUM 10.2 01/14/2025    BILITOT 0.9 01/14/2025    ALKPHOS 85 01/14/2025    AST 27 01/14/2025    ALT 42 (H) 01/14/2025    LABGLOM 59 (A) 01/14/2025    GFRAA >60 04/03/2022    AGRATIO 2.0 01/14/2025    GLOB 3.0 01/07/2021

## 2025-03-10 RX ORDER — ATORVASTATIN CALCIUM 40 MG/1
40 TABLET, FILM COATED ORAL DAILY
Qty: 30 TABLET | Refills: 0 | Status: SHIPPED | OUTPATIENT
Start: 2025-03-10

## 2025-03-10 NOTE — TELEPHONE ENCOUNTER
Requested Prescriptions     Pending Prescriptions Disp Refills    atorvastatin (LIPITOR) 40 MG tablet [Pharmacy Med Name: ATORVASTATIN 40MG TABLETS] 30 tablet 0     Sig: TAKE 1 TABLET BY MOUTH DAILY            Checked Correct Pharmacy: Yes    Any changes since last refill? No     Number: 30  Refills: 0    Last OV: 03.11.2024 Provider: TAMIE    Next OV: Visit date not found Provider: ASIYA to call and schedule follow up appt    Last Labs:   Lipid:   Lab Results   Component Value Date    CHOL 176 02/14/2023    TRIG 194 (H) 02/14/2023    HDL 40 01/14/2025    LDL 88 01/14/2025    VLDL 45 01/14/2025    CHOLHDLRATIO 7.4 (H) 05/08/2018

## 2025-03-13 NOTE — TELEPHONE ENCOUNTER
Rx refill request    Medication  carvedilol (COREG) 25 MG tablet [[id number]]  carvedilol (COREG) 25 MG tablet [[id number]]    Order Details  Dose: 25 mg Route: Oral Frequency: 2 TIMES DAILY   Dispense Quantity: 180 tablet Refills: 3          Sig: Take 1 tablet by mouth 2 times daily       Pharmacy    [id number] - [facility] [address] - P [phone] - F [phone]  [address] [facility] [address]  Phone: [phone]  Fax: [phone]     Last OV: 4/23/24  Next OV: 4/3/25

## 2025-03-14 RX ORDER — CARVEDILOL 25 MG/1
25 TABLET ORAL 2 TIMES DAILY
Qty: 180 TABLET | Refills: 1 | Status: SHIPPED | OUTPATIENT
Start: 2025-03-14

## 2025-04-01 RX ORDER — HYDROCHLOROTHIAZIDE 25 MG/1
25 TABLET ORAL DAILY
Qty: 90 TABLET | Refills: 3 | Status: SHIPPED | OUTPATIENT
Start: 2025-04-01

## 2025-04-01 NOTE — TELEPHONE ENCOUNTER
Requested Prescriptions     Pending Prescriptions Disp Refills    hydroCHLOROthiazide (HYDRODIURIL) 25 MG tablet [Pharmacy Med Name: HYDROCHLOROTHIAZIDE 25MGTABLETS] 90 tablet 3     Sig: TAKE 1 TABLET BY MOUTH DAILY            Checked Correct Pharmacy: Yes    Any changes since last refill? No     Number: 90  Refills: 3    Last OV: 4/23/2024 Provider: TAMIE    Next OV: 4/3/2025 Provider: TAMIE    Last Labs:   CMP:   Lab Results   Component Value Date     01/14/2025    K 4.1 01/14/2025    CL 95 (L) 01/14/2025    CO2 29 01/14/2025    BUN 17 01/14/2025    CREATININE 1.3 01/14/2025    GLUCOSE 107 (H) 01/14/2025    CALCIUM 10.2 01/14/2025    BILITOT 0.9 01/14/2025    ALKPHOS 85 01/14/2025    AST 27 01/14/2025    ALT 42 (H) 01/14/2025    LABGLOM 59 (A) 01/14/2025    GFRAA >60 04/03/2022    AGRATIO 2.0 01/14/2025    GLOB 3.0 01/07/2021

## 2025-04-03 ENCOUNTER — OFFICE VISIT (OUTPATIENT)
Dept: CARDIOLOGY CLINIC | Age: 70
End: 2025-04-03
Payer: MEDICARE

## 2025-04-03 VITALS
SYSTOLIC BLOOD PRESSURE: 142 MMHG | WEIGHT: 229 LBS | BODY MASS INDEX: 31.02 KG/M2 | HEART RATE: 76 BPM | HEIGHT: 72 IN | DIASTOLIC BLOOD PRESSURE: 100 MMHG

## 2025-04-03 DIAGNOSIS — I45.10 RIGHT BUNDLE BRANCH BLOCK: Chronic | ICD-10-CM

## 2025-04-03 DIAGNOSIS — I49.3 FREQUENT PVCS: Chronic | ICD-10-CM

## 2025-04-03 DIAGNOSIS — E78.00 PURE HYPERCHOLESTEROLEMIA: Chronic | ICD-10-CM

## 2025-04-03 DIAGNOSIS — R94.31 ABNORMAL EKG: Primary | ICD-10-CM

## 2025-04-03 DIAGNOSIS — I25.10 CORONARY ARTERY DISEASE INVOLVING NATIVE CORONARY ARTERY OF NATIVE HEART WITHOUT ANGINA PECTORIS: Chronic | ICD-10-CM

## 2025-04-03 DIAGNOSIS — F41.9 ANXIETY: ICD-10-CM

## 2025-04-03 DIAGNOSIS — G47.00 INSOMNIA, UNSPECIFIED TYPE: Chronic | ICD-10-CM

## 2025-04-03 DIAGNOSIS — I10 ESSENTIAL HYPERTENSION: ICD-10-CM

## 2025-04-03 DIAGNOSIS — Z92.89 H/O ANGIOGRAPHY: ICD-10-CM

## 2025-04-03 PROCEDURE — 1160F RVW MEDS BY RX/DR IN RCRD: CPT | Performed by: NURSE PRACTITIONER

## 2025-04-03 PROCEDURE — G8427 DOCREV CUR MEDS BY ELIG CLIN: HCPCS | Performed by: NURSE PRACTITIONER

## 2025-04-03 PROCEDURE — 1036F TOBACCO NON-USER: CPT | Performed by: NURSE PRACTITIONER

## 2025-04-03 PROCEDURE — 93000 ELECTROCARDIOGRAM COMPLETE: CPT | Performed by: NURSE PRACTITIONER

## 2025-04-03 PROCEDURE — 1123F ACP DISCUSS/DSCN MKR DOCD: CPT | Performed by: NURSE PRACTITIONER

## 2025-04-03 PROCEDURE — 99214 OFFICE O/P EST MOD 30 MIN: CPT | Performed by: NURSE PRACTITIONER

## 2025-04-03 PROCEDURE — 1159F MED LIST DOCD IN RCRD: CPT | Performed by: NURSE PRACTITIONER

## 2025-04-03 PROCEDURE — G8417 CALC BMI ABV UP PARAM F/U: HCPCS | Performed by: NURSE PRACTITIONER

## 2025-04-03 PROCEDURE — 3080F DIAST BP >= 90 MM HG: CPT | Performed by: NURSE PRACTITIONER

## 2025-04-03 PROCEDURE — 3017F COLORECTAL CA SCREEN DOC REV: CPT | Performed by: NURSE PRACTITIONER

## 2025-04-03 PROCEDURE — 3077F SYST BP >= 140 MM HG: CPT | Performed by: NURSE PRACTITIONER

## 2025-04-03 RX ORDER — SILDENAFIL CITRATE 20 MG/1
TABLET ORAL
COMMUNITY
Start: 2025-01-15

## 2025-04-03 RX ORDER — ZOLPIDEM TARTRATE 10 MG/1
TABLET ORAL
COMMUNITY
Start: 2025-03-31

## 2025-04-03 RX ORDER — ATORVASTATIN CALCIUM 40 MG/1
40 TABLET, FILM COATED ORAL DAILY
Qty: 90 TABLET | Refills: 3 | Status: SHIPPED | OUTPATIENT
Start: 2025-04-03

## 2025-04-03 NOTE — PROGRESS NOTES
[facility]     Outpatient Follow Up Note  Afsaneh Lepe RN, APRN,CNP     CHIEF COMPLAINT / HPI:  Follow Up     Manny Mccabe is 70 y.o. male who presents today with      Interval history:  VSS wt stable, no c/o cp SOB edema   complaint with meds   Discussed nutrition / sodium intake / fluid intake   Recommend activity as tolerated    EKG NSR RBBB       Past Medical History:   Diagnosis Date    Anxiety     BPH (benign prostatic hyperplasia)     Chronic back pain     Colon polyps     Coronary atherosclerosis 05/10/2019    cath, LAD had 20-25% narrowing but required no intervention other than maximizing medical tx.     COVID-19 virus infection     Dermatomyositis (HCC)     ED (erectile dysfunction)     History of skin cancer     Homocysteinemia 2021    Homocystine 20 on 21.    Hyperlipidemia     Hypertension     Hypothyroidism     Insomnia     Osteoarthritis     Right bundle branch block left axis -bifascicular block 2019    -Right bundle branch block with .    Spinal cord tumor     Testosterone deficiency     Wears glasses      Social History:    Social History     Tobacco Use   Smoking Status Former    Current packs/day: 0.00    Average packs/day: 1 pack/day for 33.0 years (33.0 ttl pk-yrs)    Types: Cigarettes    Start date: 1972    Quit date:     Years since quittin.2    Passive exposure: Past   Smokeless Tobacco Never     Current Medications:  Current Outpatient Medications   Medication Sig Dispense Refill    zolpidem (AMBIEN) 10 MG tablet TAKE 1/2 TO 1 TABLET BY MOUTH EVERY NIGHT AS NEEDED FOR SLEEP      sildenafil (REVATIO) 20 MG tablet TAKE FIVE TABLETS BY MOUTH ON an aempty stomach ONE HOUR prior TO intercourse      Magnesium 400 MG CAPS Take by mouth      hydroCHLOROthiazide (HYDRODIURIL) 25 MG tablet TAKE 1 TABLET BY MOUTH DAILY 90 tablet 3    carvedilol (COREG) 25 MG tablet Take 1 tablet by mouth 2 times daily 180 tablet 3    atorvastatin

## 2025-05-12 ENCOUNTER — TELEPHONE (OUTPATIENT)
Dept: FAMILY MEDICINE CLINIC | Age: 70
End: 2025-05-12

## 2025-05-12 DIAGNOSIS — F51.04 PSYCHOPHYSIOLOGICAL INSOMNIA: Primary | Chronic | ICD-10-CM

## 2025-05-12 NOTE — TELEPHONE ENCOUNTER
Pt is calling for a refill on his      Zolpidem  10 mg  #90      [facility]  [phone]

## 2025-05-13 RX ORDER — ZOLPIDEM TARTRATE 10 MG/1
10 TABLET ORAL NIGHTLY PRN
Qty: 90 TABLET | Refills: 0 | Status: SHIPPED | OUTPATIENT
Start: 2025-05-13 | End: 2025-08-11

## 2025-05-29 ENCOUNTER — TELEPHONE (OUTPATIENT)
Dept: FAMILY MEDICINE CLINIC | Age: 70
End: 2025-05-29

## 2025-05-29 NOTE — TELEPHONE ENCOUNTER
----- Message from Wali FABIAN sent at 5/29/2025  4:56 PM EDT -----  Regarding: ECC Appointment Request  ECC Appointment Request    Patient needs appointment for ECC Appointment Type: Pre-Op Visit.    Patient Requested Dates(s): BEFORE 10TH JUNE  Patient Requested Time: ANYTIME  Provider Name: Amparo Mendez APRN - CNP or any available provider    Reason for Appointment Request: Established Patient - No appointments available during search  --------------------------------------------------------------------------------------------------------------------------    Relationship to Patient: Self     Call Back Information: OK to leave message on voicemail  Preferred Call Back Number: Phone [phone]    06/23/2025 + LUMBAR FUSION + needs H&P

## 2025-05-30 ENCOUNTER — TELEPHONE (OUTPATIENT)
Dept: CARDIOLOGY CLINIC | Age: 70
End: 2025-05-30

## 2025-05-30 NOTE — TELEPHONE ENCOUNTER
Pt states he needs to have an H&P performed before procedure but could not get in with his PCP prior to surgery. Is this something Afsaneh can do or does pt need to make an appt with a cardiologist? Please advise    [phone]      Cardiac Risk Assessment  for Procedures and Surgery    What type of procedure are you having: Lumbar Fusion    When is your procedure scheduled for: 6/23/25    What physician is performing your procedure: Dr. Siddharth Hagen [facility]    Phone Number: [phone]    Fax number to send the letter: [phone]      Cardiologist Dr. Arciniega    Last Appointment: 4/3/25 with RADHA    Next Appointment: N/A    Are you on any blood thinners:  No                   If yes what?    History of Coronary Artery Disease:    Last Stress test:    Last echo:    Device type and :

## 2025-06-04 ENCOUNTER — OFFICE VISIT (OUTPATIENT)
Dept: FAMILY MEDICINE CLINIC | Age: 70
End: 2025-06-04
Payer: MEDICARE

## 2025-06-04 VITALS
BODY MASS INDEX: 29.8 KG/M2 | HEIGHT: 72 IN | OXYGEN SATURATION: 98 % | DIASTOLIC BLOOD PRESSURE: 82 MMHG | HEART RATE: 92 BPM | SYSTOLIC BLOOD PRESSURE: 126 MMHG | WEIGHT: 220 LBS

## 2025-06-04 DIAGNOSIS — M48.061 SPINAL STENOSIS OF LUMBAR REGION WITHOUT NEUROGENIC CLAUDICATION: ICD-10-CM

## 2025-06-04 DIAGNOSIS — Z01.818 PRE-OP EXAM: Primary | ICD-10-CM

## 2025-06-04 DIAGNOSIS — M54.16 RADICULOPATHY OF LUMBAR REGION: ICD-10-CM

## 2025-06-04 PROCEDURE — G8417 CALC BMI ABV UP PARAM F/U: HCPCS | Performed by: REGISTERED NURSE

## 2025-06-04 PROCEDURE — 99214 OFFICE O/P EST MOD 30 MIN: CPT | Performed by: REGISTERED NURSE

## 2025-06-04 PROCEDURE — 1036F TOBACCO NON-USER: CPT | Performed by: REGISTERED NURSE

## 2025-06-04 PROCEDURE — 1123F ACP DISCUSS/DSCN MKR DOCD: CPT | Performed by: REGISTERED NURSE

## 2025-06-04 PROCEDURE — 3079F DIAST BP 80-89 MM HG: CPT | Performed by: REGISTERED NURSE

## 2025-06-04 PROCEDURE — 3017F COLORECTAL CA SCREEN DOC REV: CPT | Performed by: REGISTERED NURSE

## 2025-06-04 PROCEDURE — G8427 DOCREV CUR MEDS BY ELIG CLIN: HCPCS | Performed by: REGISTERED NURSE

## 2025-06-04 PROCEDURE — 1160F RVW MEDS BY RX/DR IN RCRD: CPT | Performed by: REGISTERED NURSE

## 2025-06-04 PROCEDURE — 1159F MED LIST DOCD IN RCRD: CPT | Performed by: REGISTERED NURSE

## 2025-06-04 PROCEDURE — 3074F SYST BP LT 130 MM HG: CPT | Performed by: REGISTERED NURSE

## 2025-06-04 PROCEDURE — 93000 ELECTROCARDIOGRAM COMPLETE: CPT | Performed by: REGISTERED NURSE

## 2025-06-04 NOTE — PROGRESS NOTES
Preoperative Consultation      Manny Mccabe  YOB: 1955    Date of Service:  6/4/2025    There were no vitals filed for this visit.   Wt Readings from Last 2 Encounters:   04/03/25 103.9 kg (229 lb)   01/14/25 104 kg (229 lb 3.2 oz)     BP Readings from Last 3 Encounters:   04/03/25 (!) 142/100   01/14/25 130/80   11/12/24 134/82        Chief Complaint   Patient presents with    Pre-op Exam     PRE OP EXAM   LUMBAR FUSION 06/23/25 WITH DR. HUTTON AT [facility] Known Allergies  No outpatient medications have been marked as taking for the 6/4/25 encounter (Office Visit) with Pepper Castillo APRN - CNP.       This patient presents to the office today for a preoperative consultation at the request of surgeon, Dr. FLOWER HUTTON, who plans on performing RIGHT L4-L5 TRANSFORAMINAL LUMBAR FUSION on June 23 at [facility].  The current problem began 2 years ago, and symptoms have been worsening with time.  Conservative therapy: Yes: PT, BOBBI, medications, which has been not very effective..    Planned anesthesia: General   Known anesthesia problems: None   Bleeding risk: No recent or remote history of abnormal bleeding  Personal or FH of DVT/PE: No    Patient objection to receiving blood products: No    Patient Active Problem List   Diagnosis    Essential hypertension    Hyperlipidemia    Dermatomyositis (HCC)    Insomnia    Anxiety    Dysfunction of eustachian tube    History of lumbar laminectomy    Abnormal EKG    Coronary artery disease involving native coronary artery of native heart without angina pectoris    H/O angiography    Right bundle branch block left axis -bifascicular block    Homocysteinemia    Frequent PVCs    Testosterone deficiency    Melanoma of right upper arm (HCC)       Past Medical History:   Diagnosis Date    Anxiety     BPH (benign prostatic hyperplasia) 2016    Chronic back pain     Colon polyps 2005    Coronary atherosclerosis 05/10/2019    cath, 
preoperative consultation at the request of surgeon,  ***, who plans on performing *** on {Time; month:07210} {Numbers; 0-31:87876} at {[facility]:19898}.  The current problem began {NUMBERS 1-12:10} {DAY, DAYS, WEEK, WEEKS, MONTH, MONTHS, YEAR, YEARS:92802} ago, and symptoms have been {Desc; course:06113} with time.  Conservative therapy: {desc; effective/ineffective:50490}.     Planned anesthesia: {Procedures; anesthesia:812}   Known anesthesia problems: {ANESTHESIA PROBLEMS:20006}   Bleeding risk: {BLEEDING RISK:20010}  Personal or FH of DVT/PE: {NO/YES:4116422609}    Patient objection to receiving blood products: {NO/YES:6595187043}    Active Problems:    * No active hospital problems. *  Resolved Problems:    * No resolved hospital problems. *      Past Medical History:   Diagnosis Date    Anxiety     BPH (benign prostatic hyperplasia) 2016    Chronic back pain     Colon polyps 2005    Coronary atherosclerosis 05/10/2019    cath, LAD had 20-25% narrowing but required no intervention other than maximizing medical tx.     COVID-19 virus infection     Dermatomyositis (HCC)     ED (erectile dysfunction)     History of skin cancer     Homocysteinemia 01/07/2021    Homocystine 20 on 1/7/21.    Hyperlipidemia     Hypertension 2008    Hypothyroidism     Insomnia     Osteoarthritis     Right bundle branch block left axis -bifascicular block 03/07/2019    -Right bundle branch block with .    Spinal cord tumor 2011    Testosterone deficiency     Wears glasses          Past Surgical History:   Procedure Laterality Date    ARM SURGERY Right 3/1/2024    WIDE EXCISION MELANOMA RIGHT ARM performed by Agapito Hopkins MD at [facility] OR    CARDIAC CATHETERIZATION  05/07/2019    COLONOSCOPY  09/13/2012    Dr. Cortes    COLONOSCOPY  04/16/2005    Dr. Cortes, adenomatous polyps    COLONOSCOPY  06/25/2009    Dr. Cortes    COLONOSCOPY  12/30/2015    Dr. Cortes, [facility] 2020    INNER EAR SURGERY Bilateral 3/29/2022

## 2025-06-06 LAB
ABO GROUPING: NORMAL
ANTIBODY SCREEN: NEGATIVE
ESTIMATED AVERAGE GLUCOSE: 114 MG/DL
HBA1C MFR BLD: 5.6 % (ref 4.2–5.6)
SPECIMEN EXPIRATION: NORMAL

## 2025-09-03 RX ORDER — CARVEDILOL 25 MG/1
25 TABLET ORAL 2 TIMES DAILY
Qty: 180 TABLET | Refills: 3 | Status: SHIPPED | OUTPATIENT
Start: 2025-09-03

## (undated) DEVICE — GLOVE ORANGE PI 7 1/2   MSG9075

## (undated) DEVICE — AVANOS* EXTENSION SETS: Brand: EXTENSION SET, MINI BORE, 12" LENGTH, 0.50ML VOLUME 25

## (undated) DEVICE — SUTURE VCRL + SZ 4-0 L18IN ABSRB UD L19MM PS-2 3/8 CIR PRIM VCP496H

## (undated) DEVICE — MERCY HEALTH WEST TURNOVER: Brand: MEDLINE INDUSTRIES, INC.

## (undated) DEVICE — TRAY ANES SUPP NO DRUG CUST

## (undated) DEVICE — MEDIA CONTRAST RX ISOVUE-300 61% 30ML VIALS

## (undated) DEVICE — LIQUIBAND RAPID ADHESIVE 36/CS 0.8ML: Brand: MEDLINE

## (undated) DEVICE — TUBING, SUCTION, 1/4" X 12', STRAIGHT: Brand: MEDLINE

## (undated) DEVICE — APPLICATOR MEDICATED 26 CC SOLUTION HI LT ORNG CHLORAPREP

## (undated) DEVICE — COTTON BALLS: Brand: DEROYAL

## (undated) DEVICE — MAJOR SET UP: Brand: MEDLINE INDUSTRIES, INC.

## (undated) DEVICE — COVER,MAYO STAND,STERILE: Brand: MEDLINE

## (undated) DEVICE — NEEDLE SPNL 22GA L3.5IN BLK HUB S STL REG WALL FIT STYL

## (undated) DEVICE — SYSTEM DIL BLLN DIA6MM MULTISINUS

## (undated) DEVICE — SYRINGE MED 3ML CLR PLAS LUERLOCK CONN VI ACCS INTLNK 15GA

## (undated) DEVICE — GLOVE ORANGE PI 7   MSG9070

## (undated) DEVICE — STANDARD HYPODERMIC NEEDLE,POLYPROPYLENE HUB: Brand: MONOJECT

## (undated) DEVICE — NEPTUNE E-SEP SMOKE EVACUATION PENCIL, COATED, 70MM BLADE, PUSH BUTTON SWITCH: Brand: NEPTUNE E-SEP

## (undated) DEVICE — SHEET,DRAPE,53X77,STERILE: Brand: MEDLINE

## (undated) DEVICE — GLOVE ORANGE PI 8   MSG9080